# Patient Record
Sex: FEMALE | Race: BLACK OR AFRICAN AMERICAN | NOT HISPANIC OR LATINO | Employment: OTHER | URBAN - METROPOLITAN AREA
[De-identification: names, ages, dates, MRNs, and addresses within clinical notes are randomized per-mention and may not be internally consistent; named-entity substitution may affect disease eponyms.]

---

## 2024-01-22 ENCOUNTER — OFFICE VISIT (OUTPATIENT)
Dept: FAMILY MEDICINE CLINIC | Facility: CLINIC | Age: 69
End: 2024-01-22
Payer: MEDICARE

## 2024-01-22 ENCOUNTER — LAB (OUTPATIENT)
Dept: LAB | Facility: HOSPITAL | Age: 69
End: 2024-01-22
Payer: MEDICARE

## 2024-01-22 VITALS
BODY MASS INDEX: 30.62 KG/M2 | HEIGHT: 61 IN | HEART RATE: 73 BPM | DIASTOLIC BLOOD PRESSURE: 108 MMHG | TEMPERATURE: 97.9 F | OXYGEN SATURATION: 99 % | WEIGHT: 162.2 LBS | SYSTOLIC BLOOD PRESSURE: 180 MMHG

## 2024-01-22 DIAGNOSIS — Z71.1 PHYSICALLY WELL BUT WORRIED: ICD-10-CM

## 2024-01-22 DIAGNOSIS — K21.9 GASTROESOPHAGEAL REFLUX DISEASE, UNSPECIFIED WHETHER ESOPHAGITIS PRESENT: ICD-10-CM

## 2024-01-22 DIAGNOSIS — I10 HYPERTENSION, UNSPECIFIED TYPE: Primary | ICD-10-CM

## 2024-01-22 DIAGNOSIS — E78.5 DYSLIPIDEMIA: ICD-10-CM

## 2024-01-22 LAB
ALBUMIN SERPL-MCNC: 4.3 G/DL (ref 3.5–5.2)
ALBUMIN/GLOB SERPL: 1.4 G/DL
ALP SERPL-CCNC: 101 U/L (ref 39–117)
ALT SERPL W P-5'-P-CCNC: 20 U/L (ref 1–33)
ANION GAP SERPL CALCULATED.3IONS-SCNC: 10.1 MMOL/L (ref 5–15)
AST SERPL-CCNC: 21 U/L (ref 1–32)
BILIRUB SERPL-MCNC: 0.3 MG/DL (ref 0–1.2)
BUN SERPL-MCNC: 19 MG/DL (ref 8–23)
BUN/CREAT SERPL: 17.1 (ref 7–25)
CALCIUM SPEC-SCNC: 10.1 MG/DL (ref 8.6–10.5)
CHLORIDE SERPL-SCNC: 106 MMOL/L (ref 98–107)
CHOLEST SERPL-MCNC: 187 MG/DL (ref 0–200)
CO2 SERPL-SCNC: 27.9 MMOL/L (ref 22–29)
CREAT SERPL-MCNC: 1.11 MG/DL (ref 0.57–1)
DEPRECATED RDW RBC AUTO: 38.8 FL (ref 37–54)
EGFRCR SERPLBLD CKD-EPI 2021: 53.9 ML/MIN/1.73
ERYTHROCYTE [DISTWIDTH] IN BLOOD BY AUTOMATED COUNT: 13 % (ref 12.3–15.4)
GLOBULIN UR ELPH-MCNC: 3 GM/DL
GLUCOSE SERPL-MCNC: 103 MG/DL (ref 65–99)
HCT VFR BLD AUTO: 41.2 % (ref 34–46.6)
HDLC SERPL-MCNC: 34 MG/DL (ref 40–60)
HGB BLD-MCNC: 13.9 G/DL (ref 12–15.9)
LDLC SERPL CALC-MCNC: 115 MG/DL (ref 0–100)
LDLC/HDLC SERPL: 3.21 {RATIO}
MCH RBC QN AUTO: 27.8 PG (ref 26.6–33)
MCHC RBC AUTO-ENTMCNC: 33.7 G/DL (ref 31.5–35.7)
MCV RBC AUTO: 82.4 FL (ref 79–97)
PLATELET # BLD AUTO: 190 10*3/MM3 (ref 140–450)
PMV BLD AUTO: 11.1 FL (ref 6–12)
POTASSIUM SERPL-SCNC: 4.7 MMOL/L (ref 3.5–5.2)
PROT SERPL-MCNC: 7.3 G/DL (ref 6–8.5)
RBC # BLD AUTO: 5 10*6/MM3 (ref 3.77–5.28)
SODIUM SERPL-SCNC: 144 MMOL/L (ref 136–145)
T4 FREE SERPL-MCNC: 1.19 NG/DL (ref 0.93–1.7)
TRIGL SERPL-MCNC: 219 MG/DL (ref 0–150)
TSH SERPL DL<=0.05 MIU/L-ACNC: 1.42 UIU/ML (ref 0.27–4.2)
VLDLC SERPL-MCNC: 38 MG/DL (ref 5–40)
WBC NRBC COR # BLD AUTO: 5.06 10*3/MM3 (ref 3.4–10.8)

## 2024-01-22 PROCEDURE — 85027 COMPLETE CBC AUTOMATED: CPT

## 2024-01-22 PROCEDURE — 80061 LIPID PANEL: CPT

## 2024-01-22 PROCEDURE — 84439 ASSAY OF FREE THYROXINE: CPT

## 2024-01-22 PROCEDURE — 84443 ASSAY THYROID STIM HORMONE: CPT

## 2024-01-22 PROCEDURE — 80053 COMPREHEN METABOLIC PANEL: CPT

## 2024-01-22 RX ORDER — METOPROLOL TARTRATE 100 MG/1
TABLET ORAL
COMMUNITY
Start: 2023-11-27 | End: 2024-01-22 | Stop reason: SDUPTHER

## 2024-01-22 RX ORDER — OMEPRAZOLE 40 MG/1
CAPSULE, DELAYED RELEASE ORAL
COMMUNITY
Start: 2023-11-15 | End: 2024-01-22 | Stop reason: SDUPTHER

## 2024-01-22 RX ORDER — METOPROLOL TARTRATE 100 MG/1
100 TABLET ORAL DAILY
Qty: 30 TABLET | Refills: 2 | Status: SHIPPED | OUTPATIENT
Start: 2024-01-22

## 2024-01-22 RX ORDER — LISINOPRIL 10 MG/1
30 TABLET ORAL DAILY
Qty: 90 TABLET | Refills: 1 | Status: SHIPPED | OUTPATIENT
Start: 2024-01-22

## 2024-01-22 RX ORDER — LISINOPRIL 10 MG/1
30 TABLET ORAL DAILY
COMMUNITY
Start: 2023-11-28 | End: 2024-01-22 | Stop reason: SDUPTHER

## 2024-01-22 RX ORDER — OMEPRAZOLE 40 MG/1
40 CAPSULE, DELAYED RELEASE ORAL DAILY
Qty: 30 CAPSULE | Refills: 2 | Status: SHIPPED | OUTPATIENT
Start: 2024-01-22

## 2024-01-22 NOTE — ASSESSMENT & PLAN NOTE
Hypertension is worsening.  Dietary sodium restriction.  Regular aerobic exercise.  Medication changes per orders.  Ambulatory blood pressure monitoring.  Blood pressure will be reassessed in 2 weeks.    Discussed with patient that she needs to monitor her blood pressure twice daily.  Keep these measurements on a piece of paper and please bring her back in 2 weeks to your next appointment.  Discussed with the patient the importance of controlling her blood pressure.  As she is asymptomatic today I we will make changes to her current medications and I would like to see her back in 2 weeks.  Discussed at length that I would like her to reduce her salt intake.  I have attached recommendations to her AVS.  I advised her that a lot of her TV dinners and seasonings can have added salt.  I would like her to make changes in her diet and make sure that her canned vegetables do not have added salt as well.    Discussed with her that if she begins to have chest pain, visual changes, abdominal pain, facial asymmetry or weakness to go to the emergency room.  Verbalized understanding and agreed to this plan.
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
## 2024-01-23 ENCOUNTER — TELEPHONE (OUTPATIENT)
Dept: FAMILY MEDICINE CLINIC | Facility: CLINIC | Age: 69
End: 2024-01-23
Payer: MEDICARE

## 2024-01-23 RX ORDER — ATORVASTATIN CALCIUM 10 MG/1
10 TABLET, FILM COATED ORAL NIGHTLY
Qty: 90 TABLET | Refills: 3 | Status: SHIPPED | OUTPATIENT
Start: 2024-01-23

## 2024-01-23 NOTE — TELEPHONE ENCOUNTER
----- Message from Fiona Fitch PA-C sent at 1/23/2024  8:43 AM EST -----    I have reviewed your blood work this morning.  Your GFR which is your kidney function is a little low.  I would assume that this is likely due to your high blood pressure.  It is really important that you take your medications daily and continue to monitor your blood pressure.  I would like to check these labs in 1 month, but still would like to see you in 2 weeks.     I am also going to start you on a medicine for your cholesterol called atorvastatin at a low dose.  You can pick this up from your pharmacy.  We will check this again in 6 months.    Attempted to contact patient, no answer. LVM with office # given.     Please relay message and document.

## 2024-01-24 NOTE — TELEPHONE ENCOUNTER
Patient informed of lab results and changes to medications. She will come in on 2/9/2024 to discuss further.     Quantity Per Injection Site (Units): 4

## 2024-02-09 ENCOUNTER — OFFICE VISIT (OUTPATIENT)
Dept: FAMILY MEDICINE CLINIC | Facility: CLINIC | Age: 69
End: 2024-02-09
Payer: MEDICARE

## 2024-02-09 VITALS
HEART RATE: 82 BPM | BODY MASS INDEX: 30.29 KG/M2 | WEIGHT: 160.4 LBS | HEIGHT: 61 IN | OXYGEN SATURATION: 100 % | DIASTOLIC BLOOD PRESSURE: 98 MMHG | SYSTOLIC BLOOD PRESSURE: 138 MMHG | TEMPERATURE: 97.6 F

## 2024-02-09 DIAGNOSIS — Z12.31 ENCOUNTER FOR SCREENING MAMMOGRAM FOR MALIGNANT NEOPLASM OF BREAST: ICD-10-CM

## 2024-02-09 DIAGNOSIS — E78.5 DYSLIPIDEMIA: ICD-10-CM

## 2024-02-09 DIAGNOSIS — L30.9 DERMATITIS: Primary | ICD-10-CM

## 2024-02-09 DIAGNOSIS — H61.23 BILATERAL IMPACTED CERUMEN: ICD-10-CM

## 2024-02-09 DIAGNOSIS — I10 PRIMARY HYPERTENSION: ICD-10-CM

## 2024-02-09 DIAGNOSIS — Z78.0 POST-MENOPAUSAL: ICD-10-CM

## 2024-02-09 NOTE — ASSESSMENT & PLAN NOTE
Recommend that the patient undergo ear cleaning today as her impacted cerumen is starting to cause difficulty for her hearing.  Patient was counseled on the risks and benefits.  She would like to attempt this today.

## 2024-02-09 NOTE — PROGRESS NOTES
The ABCs of the Annual Wellness Visit  Subsequent Medicare Wellness Visit    Subjective    {Wrapup  Review (Popup)  Advance Care Planning  Labs  CC  Problem List  Visit Diagnosis  Medications  Result Review  Imaging  [facility]  [facility]  SnapShot  Encounters  Notes  Media  Procedures :23}  Falguni Laureano is a 68 y.o. female who presents for a Subsequent Medicare Wellness Visit.    The following portions of the patient's history were reviewed and   updated as appropriate: allergies, current medications, past family history, past medical history, past social history, past surgical history, and problem list.    Compared to one year ago, the patient feels her physical   health is {better worse same:94629}.    Compared to one year ago, the patient feels her mental   health is {better worse same:18055}.    Recent Hospitalizations:  {Hospital Admission Status in the last 365 days:42063}      Current Medical Providers:  Patient Care Team:  Fiona Fitch PA-C as PCP - General (Physician Assistant)    Outpatient Medications Prior to Visit   Medication Sig Dispense Refill    atorvastatin (LIPITOR) 10 MG tablet Take 1 tablet by mouth Every Night. 90 tablet 3    lisinopril (PRINIVIL,ZESTRIL) 10 MG tablet Take 3 tablets by mouth Daily. 90 tablet 1    metoprolol tartrate (LOPRESSOR) 100 MG tablet Take 1 tablet by mouth Daily. 30 tablet 2    omeprazole (priLOSEC) 40 MG capsule Take 1 capsule by mouth Daily. 30 capsule 2     No facility-administered medications prior to visit.       No opioid medication identified on active medication list. I have reviewed chart for other potential  high risk medication/s and harmful drug interactions in the elderly.        Aspirin is not on active medication list.  Aspirin use is not indicated based on review of current medical condition/s. Risk of harm outweighs potential benefits.  .    Patient Active Problem List   Diagnosis    Hypertension  "   Gastroesophageal reflux disease    Physically well but worried     Advance Care Planning   Advance Care Planning     Advance Directive is not on file.  {ACP Discussion, Advance Directive not in EMR:17585}     Objective    Vitals:    24 1011   BP: 138/98   BP Location: Left arm   Patient Position: Sitting   Cuff Size: Adult   Pulse: 82   Temp: 97.6 °F (36.4 °C)   TempSrc: Infrared   SpO2: 100%   Weight: 72.8 kg (160 lb 6.4 oz)   Height: 154.9 cm (61\")   PainSc: 0-No pain     Estimated body mass index is 30.31 kg/m² as calculated from the following:    Height as of this encounter: 154.9 cm (61\").    Weight as of this encounter: 72.8 kg (160 lb 6.4 oz).           Does the patient have evidence of cognitive impairment?   {Yes/No:09081}    Lab Results   Component Value Date    TRIG 219 (H) 2024    HDL 34 (L) 2024     (H) 2024    VLDL 38 2024          HEALTH RISK ASSESSMENT    Smoking Status:  Social History     Tobacco Use   Smoking Status Never   Smokeless Tobacco Never     Alcohol Consumption:  Social History     Substance and Sexual Activity   Alcohol Use Not Currently     Fall Risk Screen:    STEADI Fall Risk Assessment was completed, and patient is at LOW risk for falls.Assessment completed on:2024    Depression Screenin/9/2024    10:16 AM   PHQ-2/PHQ-9 Depression Screening   Little Interest or Pleasure in Doing Things 0-->not at all   Feeling Down, Depressed or Hopeless 0-->not at all   PHQ-9: Brief Depression Severity Measure Score 0       Health Habits and Functional and Cognitive Screenin/9/2024    10:16 AM   Functional & Cognitive Status   Do you have difficulty preparing food and eating? No   Do you have difficulty bathing yourself, getting dressed or grooming yourself? No   Do you have difficulty using the toilet? No   Do you have difficulty moving around from place to place? No   Do you have trouble with steps or getting out of a bed or a chair? " No   Current Diet Limited Junk Food   Dental Exam Not up to date   Eye Exam Not up to date   Exercise (times per week) 0 times per week   Current Exercises Include No Regular Exercise   Do you need help using the phone?  No   Are you deaf or do you have serious difficulty hearing?  No   Do you need help to go to places out of walking distance? No   Do you need help shopping? No   Do you need help preparing meals?  No   Do you need help with housework?  No   Do you need help with laundry? No   Do you need help taking your medications? No   Do you need help managing money? No   Do you ever drive or ride in a car without wearing a seat belt? No   Have you felt unusual stress, anger or loneliness in the last month? No   Who do you live with? Child   If you need help, do you have trouble finding someone available to you? No   Have you been bothered in the last four weeks by sexual problems? No   Do you have difficulty concentrating, remembering or making decisions? No       Age-appropriate Screening Schedule:  Refer to the list below for future screening recommendations based on patient's age, sex and/or medical conditions. Orders for these recommended tests are listed in the plan section. The patient has been provided with a written plan.    Health Maintenance   Topic Date Due    MAMMOGRAM  Never done    DXA SCAN  Never done    COLORECTAL CANCER SCREENING  Never done    ZOSTER VACCINE (1 of 2) Never done    RSV Vaccine - Adults (1 - 1-dose 60+ series) Never done    HEPATITIS C SCREENING  Never done    ANNUAL WELLNESS VISIT  Never done    INFLUENZA VACCINE  03/31/2024 (Originally 8/1/2023)    COVID-19 Vaccine (1) 04/30/2024 (Originally 1955)    Pneumococcal Vaccine 65+ (1 of 1 - PCV) 02/09/2025 (Originally 3/19/2019)    TDAP/TD VACCINES (1 - Tdap) 02/09/2025 (Originally [birth year]19/1974)    BMI FOLLOWUP  01/22/2025                  CMS Preventative Services Quick Reference  Risk Factors Identified During  Encounter:    {Medicare Wellness Risk Factors:45656}    The above risks/problems have been discussed with the patient.  Pertinent information has been shared with the patient in the After Visit Summary.    There are no diagnoses linked to this encounter.    Follow Up:   Next Medicare Wellness visit to be scheduled in 1 year.      An After Visit Summary and PPPS were made available to the patient.

## 2024-02-09 NOTE — ASSESSMENT & PLAN NOTE
Hypertension is improving with treatment.  Dietary sodium restriction.  Regular aerobic exercise.  Medication changes per orders.  Ambulatory blood pressure monitoring.  Blood pressure will be reassessed at the next regular appointment.

## 2024-02-09 NOTE — PROGRESS NOTES
The ABCs of the Annual Wellness Visit  Subsequent Medicare Wellness Visit    Subjective    Falguni Laureano is a 68 y.o. female who presents for a Subsequent Medicare Wellness Visit.    The following portions of the patient's history were reviewed and   updated as appropriate: allergies, current medications, past family history, past medical history, past social history, past surgical history, and problem list.    Compared to one year ago, the patient feels her physical   health is better.    Compared to one year ago, the patient feels her mental   health is better.    Recent Hospitalizations:  She was not admitted to the hospital during the last year.       Current Medical Providers:  Patient Care Team:  Fiona Fitch PA-C as PCP - General (Physician Assistant)    Outpatient Medications Prior to Visit   Medication Sig Dispense Refill    atorvastatin (LIPITOR) 10 MG tablet Take 1 tablet by mouth Every Night. 90 tablet 3    lisinopril (PRINIVIL,ZESTRIL) 10 MG tablet Take 3 tablets by mouth Daily. 90 tablet 1    metoprolol tartrate (LOPRESSOR) 100 MG tablet Take 1 tablet by mouth Daily. 30 tablet 2    omeprazole (priLOSEC) 40 MG capsule Take 1 capsule by mouth Daily. 30 capsule 2     No facility-administered medications prior to visit.       No opioid medication identified on active medication list. I have reviewed chart for other potential  high risk medication/s and harmful drug interactions in the elderly.        Aspirin is not on active medication list.  Aspirin use is not indicated based on review of current medical condition/s. Risk of harm outweighs potential benefits.  .    Patient Active Problem List   Diagnosis    Hypertension    Gastroesophageal reflux disease    Physically well but worried    Dermatitis    Bilateral impacted cerumen    Dyslipidemia     Advance Care Planning   Advance Care Planning     Advance Directive is not on file.  ACP discussion was held with the patient during this visit. Patient  "does not have an advance directive, information provided.     Objective    Vitals:    24 1011   BP: 138/98   BP Location: Left arm   Patient Position: Sitting   Cuff Size: Adult   Pulse: 82   Temp: 97.6 °F (36.4 °C)   TempSrc: Infrared   SpO2: 100%   Weight: 72.8 kg (160 lb 6.4 oz)   Height: 154.9 cm (61\")   PainSc: 0-No pain     Estimated body mass index is 30.31 kg/m² as calculated from the following:    Height as of this encounter: 154.9 cm (61\").    Weight as of this encounter: 72.8 kg (160 lb 6.4 oz).         Does the patient have evidence of cognitive impairment? No    Lab Results   Component Value Date    TRIG 219 (H) 2024    HDL 34 (L) 2024     (H) 2024    VLDL 38 2024        HEALTH RISK ASSESSMENT    Smoking Status:  Social History     Tobacco Use   Smoking Status Never   Smokeless Tobacco Never     Alcohol Consumption:  Social History     Substance and Sexual Activity   Alcohol Use Not Currently     Fall Risk Screen:    STEADI Fall Risk Assessment was completed, and patient is at LOW risk for falls.Assessment completed on:2024    Depression Screenin/9/2024    10:16 AM   PHQ-2/PHQ-9 Depression Screening   Little Interest or Pleasure in Doing Things 0-->not at all   Feeling Down, Depressed or Hopeless 0-->not at all   PHQ-9: Brief Depression Severity Measure Score 0       Health Habits and Functional and Cognitive Screenin/9/2024    10:16 AM   Functional & Cognitive Status   Do you have difficulty preparing food and eating? No   Do you have difficulty bathing yourself, getting dressed or grooming yourself? No   Do you have difficulty using the toilet? No   Do you have difficulty moving around from place to place? No   Do you have trouble with steps or getting out of a bed or a chair? No   Current Diet Limited Junk Food   Dental Exam Not up to date   Eye Exam Not up to date   Exercise (times per week) 0 times per week   Current Exercises Include No " Regular Exercise   Do you need help using the phone?  No   Are you deaf or do you have serious difficulty hearing?  No   Do you need help to go to places out of walking distance? No   Do you need help shopping? No   Do you need help preparing meals?  No   Do you need help with housework?  No   Do you need help with laundry? No   Do you need help taking your medications? No   Do you need help managing money? No   Do you ever drive or ride in a car without wearing a seat belt? No   Have you felt unusual stress, anger or loneliness in the last month? No   Who do you live with? Child   If you need help, do you have trouble finding someone available to you? No   Have you been bothered in the last four weeks by sexual problems? No   Do you have difficulty concentrating, remembering or making decisions? No       Age-appropriate Screening Schedule:  Refer to the list below for future screening recommendations based on patient's age, sex and/or medical conditions. Orders for these recommended tests are listed in the plan section. The patient has been provided with a written plan.    Health Maintenance   Topic Date Due    MAMMOGRAM  Never done    DXA SCAN  Never done    COLORECTAL CANCER SCREENING  Never done    ZOSTER VACCINE (1 of 2) Never done    RSV Vaccine - Adults (1 - 1-dose 60+ series) Never done    HEPATITIS C SCREENING  Never done    ANNUAL WELLNESS VISIT  Never done    INFLUENZA VACCINE  03/31/2024 (Originally 8/1/2023)    COVID-19 Vaccine (1) 04/30/2024 (Originally 1955)    Pneumococcal Vaccine 65+ (1 of 1 - PCV) 02/09/2025 (Originally 3/19/2019)    TDAP/TD VACCINES (1 - Tdap) 02/09/2025 (Originally 3/19/1974)    BMI FOLLOWUP  01/22/2025                  CMS Preventative Services Quick Reference  Risk Factors Identified During Encounter  None Identified    The above risks/problems have been discussed with the patient.  Pertinent information has been shared with the patient in the After Visit Summary.  An  "After Visit Summary and PPPS were made available to the patient.    Follow Up:   Next Medicare Wellness visit to be scheduled in 1 year.       Additional E&M Note during same encounter follows:  Patient has multiple medical problems which are significant and separately identifiable that require additional work above and beyond the Medicare Wellness Visit.      Chief Complaint  Medicare Wellness-subsequent    Subjective        HPI  Falguni Laureano is also being seen today for Blood pressure check.  Patient was recently seen for new patient in this office.  She had uncontrolled hypertension.  We made changes to her blood pressure medication 2 weeks ago.  Patient's blood pressure is much better today but still elevated.  Denies chest pain, shortness of breath, visual changes.    Patient also was started on Lipitor for cholesterol at her last visit.  Patient to continue and we will recheck labs in 6 months.    Patient presents today with complaints of pain around her eye.  Patient denies any pain with movement of her eye or in her actual eye itself.  She denies any visual changes.  Patient states that it is the skin near the corner of her eye.  She states she noticed it yesterday.  She states that the pain is very \"small.\"  But that it is bothering her.    Patient also states her hearing has been muffled recently.  She states that she has always had very waxy ears.  She states that her ears feel \"full.\"  Denies any pain.  Patient would like her ears looked at today.  She states she has been using over-the-counter drops at home and they have not been working.    Patient states she did receive a colonoscopy in the past several years.  We requested her records about 2 weeks ago.  Once we get these we will update need for colonoscopy at this point.    Patient has never had a mammogram done or does not remember the last time she had 1.  Patient has also not had a DEXA scan done.    Objective   Vital Signs:  /98 (BP " "Location: Left arm, Patient Position: Sitting, Cuff Size: Adult)   Pulse 82   Temp 97.6 °F (36.4 °C) (Infrared)   Ht 154.9 cm (61\")   Wt 72.8 kg (160 lb 6.4 oz)   SpO2 100%   BMI 30.31 kg/m²     Physical Exam  Vitals reviewed.   Constitutional:       Appearance: Normal appearance.   HENT:      Head: Normocephalic and atraumatic.      Right Ear: External ear normal. There is impacted cerumen.      Left Ear: External ear normal. There is impacted cerumen.      Nose: Nose normal.      Mouth/Throat:      Mouth: Mucous membranes are moist.   Eyes:      Pupils: Pupils are equal, round, and reactive to light.   Cardiovascular:      Rate and Rhythm: Normal rate and regular rhythm.      Pulses: Normal pulses.      Heart sounds: Normal heart sounds.   Pulmonary:      Effort: Pulmonary effort is normal.      Breath sounds: Normal breath sounds.   Abdominal:      General: Abdomen is flat. Bowel sounds are normal.   Skin:     General: Skin is warm.   Neurological:      General: No focal deficit present.      Mental Status: She is alert and oriented to person, place, and time.   Psychiatric:         Mood and Affect: Mood normal.          The following data was reviewed by: Fiona Fitch PA-C on 02/09/2024:  CMP          1/22/2024    10:45   CMP   Glucose 103    BUN 19    Creatinine 1.11    EGFR 53.9    Sodium 144    Potassium 4.7    Chloride 106    Calcium 10.1    Total Protein 7.3    Albumin 4.3    Globulin 3.0    Total Bilirubin 0.3    Alkaline Phosphatase 101    AST (SGOT) 21    ALT (SGPT) 20    Albumin/Globulin Ratio 1.4    BUN/Creatinine Ratio 17.1    Anion Gap 10.1      CBC          1/22/2024    10:45   CBC   WBC 5.06    RBC 5.00    Hemoglobin 13.9    Hematocrit 41.2    MCV 82.4    MCH 27.8    MCHC 33.7    RDW 13.0    Platelets 190      Lipid Panel          1/22/2024    10:45   Lipid Panel   Total Cholesterol 187    Triglycerides 219    HDL Cholesterol 34    VLDL Cholesterol 38    LDL Cholesterol  115    LDL/HDL " Ratio 3.21         Assessment and Plan   Diagnoses and all orders for this visit:    1. Dermatitis (Primary)  Assessment & Plan:  Appears to be some skin breakdown near the eye.  Where patient is rubbing it and constantly blinking I could see where this would be irritating.  Recommend using a warm washcloth to lay on the area for soothing effects.  Would also recommend that she applies some topical Vaseline to the area.  Be mindful not to get this in your eye.      2. Primary hypertension  Assessment & Plan:  Hypertension is improving with treatment.  Dietary sodium restriction.  Regular aerobic exercise.  Medication changes per orders.  Ambulatory blood pressure monitoring.  Blood pressure will be reassessed at the next regular appointment.      3. Bilateral impacted cerumen  Assessment & Plan:  Recommend that the patient undergo ear cleaning today as her impacted cerumen is starting to cause difficulty for her hearing.  Patient was counseled on the risks and benefits.  She would like to attempt this today.      4. Post-menopausal  -     DEXA Bone Density Axial; Future    5. Encounter for screening mammogram for malignant neoplasm of breast  -     Mammo Screening Digital Tomosynthesis Bilateral With CAD; Future    6. Dyslipidemia  Assessment & Plan:  Continue Lipitor 10 mg daily.  Will recheck in about 6 months.           I spent 35 minutes caring for Falguni on this date of service. This time includes time spent by me in the following activities:preparing for the visit, reviewing tests, obtaining and/or reviewing a separately obtained history, performing a medically appropriate examination and/or evaluation , counseling and educating the patient/family/caregiver, and ordering medications, tests, or procedures  Follow Up   Return in about 6 months (around 8/9/2024) for Recheck BP and labs .  Patient was given instructions and counseling regarding her condition or for health maintenance advice. Please see specific  information pulled into the AVS if appropriate.

## 2024-02-09 NOTE — ASSESSMENT & PLAN NOTE
Appears to be some skin breakdown near the eye.  Where patient is rubbing it and constantly blinking I could see where this would be irritating.  Recommend using a warm washcloth to lay on the area for soothing effects.  Would also recommend that she applies some topical Vaseline to the area.  Be mindful not to get this in your eye.

## 2024-02-29 ENCOUNTER — TELEPHONE (OUTPATIENT)
Dept: FAMILY MEDICINE CLINIC | Facility: CLINIC | Age: 69
End: 2024-02-29
Payer: MEDICARE

## 2024-02-29 ENCOUNTER — DOCUMENTATION (OUTPATIENT)
Dept: FAMILY MEDICINE CLINIC | Facility: CLINIC | Age: 69
End: 2024-02-29
Payer: MEDICARE

## 2024-02-29 NOTE — TELEPHONE ENCOUNTER
Called to inform the patient that I need her to sign some additional documentation in order to receive her old health records from [place].  I told the patient that I would leave them at the desk for her to sign and we would resubmit.  Patient states she will come in when she has a chance.

## 2024-03-13 DIAGNOSIS — I10 HYPERTENSION, UNSPECIFIED TYPE: ICD-10-CM

## 2024-03-13 RX ORDER — LISINOPRIL 10 MG/1
30 TABLET ORAL DAILY
Qty: 90 TABLET | Refills: 1 | Status: SHIPPED | OUTPATIENT
Start: 2024-03-13

## 2024-03-13 NOTE — TELEPHONE ENCOUNTER
Rx Refill Note  Requested Prescriptions     Pending Prescriptions Disp Refills    lisinopril (PRINIVIL,ZESTRIL) 10 MG tablet [Pharmacy Med Name: LISINOPRIL 10MG TABLETS] 90 tablet 1     Sig: TAKE 3 TABLETS BY MOUTH DAILY      Last office visit with prescribing clinician: 2/9/2024   Last telemedicine visit with prescribing clinician: Visit date not found   Next office visit with prescribing clinician: 8/9/2024                         Would you like a call back once the refill request has been completed: [] Yes [] No    If the office needs to give you a call back, can they leave a voicemail: [] Yes [] No    Ileana Aldridge MA  03/13/24, 10:13 EDT

## 2024-04-29 DIAGNOSIS — K21.9 GASTROESOPHAGEAL REFLUX DISEASE, UNSPECIFIED WHETHER ESOPHAGITIS PRESENT: ICD-10-CM

## 2024-04-29 DIAGNOSIS — I10 HYPERTENSION, UNSPECIFIED TYPE: ICD-10-CM

## 2024-04-29 RX ORDER — LISINOPRIL 10 MG/1
30 TABLET ORAL DAILY
Qty: 270 TABLET | Refills: 0 | Status: SHIPPED | OUTPATIENT
Start: 2024-04-29

## 2024-04-29 RX ORDER — OMEPRAZOLE 40 MG/1
40 CAPSULE, DELAYED RELEASE ORAL DAILY
Qty: 90 CAPSULE | Refills: 0 | Status: SHIPPED | OUTPATIENT
Start: 2024-04-29

## 2024-05-02 ENCOUNTER — HOSPITAL ENCOUNTER (OUTPATIENT)
Dept: BONE DENSITY | Facility: HOSPITAL | Age: 69
Discharge: HOME | End: 2024-05-02
Payer: MEDICARE

## 2024-05-02 ENCOUNTER — HOSPITAL ENCOUNTER (OUTPATIENT)
Dept: MAMMOGRAPHY | Facility: HOSPITAL | Age: 69
Discharge: HOME | End: 2024-05-02
Payer: MEDICARE

## 2024-05-02 DIAGNOSIS — Z78.0 POST-MENOPAUSAL: ICD-10-CM

## 2024-05-02 DIAGNOSIS — Z12.31 ENCOUNTER FOR SCREENING MAMMOGRAM FOR MALIGNANT NEOPLASM OF BREAST: ICD-10-CM

## 2024-05-02 PROCEDURE — 77063 BREAST TOMOSYNTHESIS BI: CPT

## 2024-05-02 PROCEDURE — 77080 DXA BONE DENSITY AXIAL: CPT

## 2024-05-02 PROCEDURE — 77067 SCR MAMMO BI INCL CAD: CPT | Performed by: RADIOLOGY

## 2024-05-02 PROCEDURE — 77063 BREAST TOMOSYNTHESIS BI: CPT | Performed by: RADIOLOGY

## 2024-05-02 PROCEDURE — 77067 SCR MAMMO BI INCL CAD: CPT

## 2024-05-03 ENCOUNTER — TELEPHONE (OUTPATIENT)
Dept: FAMILY MEDICINE CLINIC | Facility: CLINIC | Age: 69
End: 2024-05-03
Payer: MEDICARE

## 2024-05-03 DIAGNOSIS — M85.89 OSTEOPENIA OF MULTIPLE SITES: Primary | ICD-10-CM

## 2024-05-29 DIAGNOSIS — K21.9 GASTROESOPHAGEAL REFLUX DISEASE, UNSPECIFIED WHETHER ESOPHAGITIS PRESENT: ICD-10-CM

## 2024-05-29 RX ORDER — OMEPRAZOLE 40 MG/1
40 CAPSULE, DELAYED RELEASE ORAL DAILY
Qty: 90 CAPSULE | Refills: 0 | Status: SHIPPED | OUTPATIENT
Start: 2024-05-29

## 2024-06-19 ENCOUNTER — TRANSCRIBE ORDERS (OUTPATIENT)
Dept: MAMMOGRAPHY | Facility: HOSPITAL | Age: 69
End: 2024-06-19
Payer: MEDICARE

## 2024-06-19 ENCOUNTER — HOSPITAL ENCOUNTER (OUTPATIENT)
Dept: ULTRASOUND IMAGING | Facility: HOSPITAL | Age: 69
Discharge: HOME | End: 2024-06-19
Payer: MEDICARE

## 2024-06-19 ENCOUNTER — HOSPITAL ENCOUNTER (OUTPATIENT)
Dept: MAMMOGRAPHY | Facility: HOSPITAL | Age: 69
Discharge: HOME | End: 2024-06-19
Payer: MEDICARE

## 2024-06-19 DIAGNOSIS — R92.8 ABNORMAL MAMMOGRAM: ICD-10-CM

## 2024-06-19 DIAGNOSIS — R92.8 ABNORMAL MAMMOGRAM: Primary | ICD-10-CM

## 2024-06-19 PROCEDURE — 76642 ULTRASOUND BREAST LIMITED: CPT | Performed by: RADIOLOGY

## 2024-06-19 PROCEDURE — G0279 TOMOSYNTHESIS, MAMMO: HCPCS

## 2024-06-19 PROCEDURE — G0279 TOMOSYNTHESIS, MAMMO: HCPCS | Performed by: RADIOLOGY

## 2024-06-19 PROCEDURE — 77066 DX MAMMO INCL CAD BI: CPT | Performed by: RADIOLOGY

## 2024-06-19 PROCEDURE — 77066 DX MAMMO INCL CAD BI: CPT

## 2024-06-19 PROCEDURE — 76642 ULTRASOUND BREAST LIMITED: CPT

## 2024-06-21 DIAGNOSIS — I10 HYPERTENSION, UNSPECIFIED TYPE: ICD-10-CM

## 2024-06-21 RX ORDER — METOPROLOL TARTRATE 100 MG/1
100 TABLET ORAL DAILY
Qty: 90 TABLET | Refills: 0 | Status: SHIPPED | OUTPATIENT
Start: 2024-06-21

## 2024-06-24 ENCOUNTER — TELEPHONE (OUTPATIENT)
Dept: MAMMOGRAPHY | Facility: HOSPITAL | Age: 69
End: 2024-06-24
Payer: MEDICARE

## 2024-06-24 NOTE — TELEPHONE ENCOUNTER
Returned patient's call, questions have been answered. Encouraged calling [facility] with any additional questions.

## 2024-06-25 ENCOUNTER — APPOINTMENT (OUTPATIENT)
Dept: GENERAL RADIOLOGY | Facility: HOSPITAL | Age: 69
End: 2024-06-25
Payer: MEDICARE

## 2024-06-25 ENCOUNTER — APPOINTMENT (OUTPATIENT)
Dept: CT IMAGING | Facility: HOSPITAL | Age: 69
End: 2024-06-25
Payer: MEDICARE

## 2024-06-25 ENCOUNTER — HOSPITAL ENCOUNTER (OUTPATIENT)
Facility: HOSPITAL | Age: 69
Setting detail: OBSERVATION
Discharge: HOME | End: 2024-06-27
Attending: EMERGENCY MEDICINE | Admitting: INTERNAL MEDICINE
Payer: MEDICARE

## 2024-06-25 ENCOUNTER — OFFICE VISIT (OUTPATIENT)
Dept: FAMILY MEDICINE CLINIC | Facility: CLINIC | Age: 69
End: 2024-06-25
Payer: MEDICARE

## 2024-06-25 VITALS
BODY MASS INDEX: 30.5 KG/M2 | SYSTOLIC BLOOD PRESSURE: 190 MMHG | WEIGHT: 161.4 LBS | OXYGEN SATURATION: 99 % | HEART RATE: 71 BPM | DIASTOLIC BLOOD PRESSURE: 120 MMHG

## 2024-06-25 DIAGNOSIS — I10 HYPERTENSION, UNSPECIFIED TYPE: ICD-10-CM

## 2024-06-25 DIAGNOSIS — R51.9 GENERALIZED HEADACHE: ICD-10-CM

## 2024-06-25 DIAGNOSIS — I16.0 HYPERTENSIVE URGENCY: Primary | ICD-10-CM

## 2024-06-25 DIAGNOSIS — Z86.73 OLD CEREBROVASCULAR ACCIDENT (CVA) WITHOUT LATE EFFECT: ICD-10-CM

## 2024-06-25 LAB
ALBUMIN SERPL-MCNC: 4.3 G/DL (ref 3.5–5.2)
ALBUMIN/GLOB SERPL: 1.4 G/DL
ALP SERPL-CCNC: 97 U/L (ref 39–117)
ALT SERPL W P-5'-P-CCNC: 16 U/L (ref 1–33)
ANION GAP SERPL CALCULATED.3IONS-SCNC: 7 MMOL/L (ref 5–15)
AST SERPL-CCNC: 17 U/L (ref 1–32)
BASOPHILS # BLD AUTO: 0.01 10*3/MM3 (ref 0–0.2)
BASOPHILS NFR BLD AUTO: 0.2 % (ref 0–1.5)
BILIRUB SERPL-MCNC: 0.5 MG/DL (ref 0–1.2)
BUN SERPL-MCNC: 15 MG/DL (ref 8–23)
BUN/CREAT SERPL: 14.3 (ref 7–25)
CALCIUM SPEC-SCNC: 9.7 MG/DL (ref 8.6–10.5)
CHLORIDE SERPL-SCNC: 103 MMOL/L (ref 98–107)
CO2 SERPL-SCNC: 29 MMOL/L (ref 22–29)
CREAT SERPL-MCNC: 1.05 MG/DL (ref 0.57–1)
DEPRECATED RDW RBC AUTO: 37.2 FL (ref 37–54)
EGFRCR SERPLBLD CKD-EPI 2021: 57.3 ML/MIN/1.73
EOSINOPHIL # BLD AUTO: 0.11 10*3/MM3 (ref 0–0.4)
EOSINOPHIL NFR BLD AUTO: 2.4 % (ref 0.3–6.2)
ERYTHROCYTE [DISTWIDTH] IN BLOOD BY AUTOMATED COUNT: 12.3 % (ref 12.3–15.4)
GEN 5 2HR TROPONIN T REFLEX: 14 NG/L
GLOBULIN UR ELPH-MCNC: 3 GM/DL
GLUCOSE SERPL-MCNC: 97 MG/DL (ref 65–99)
HCT VFR BLD AUTO: 42.2 % (ref 34–46.6)
HGB BLD-MCNC: 13.7 G/DL (ref 12–15.9)
HOLD SPECIMEN: NORMAL
IMM GRANULOCYTES # BLD AUTO: 0.01 10*3/MM3 (ref 0–0.05)
IMM GRANULOCYTES NFR BLD AUTO: 0.2 % (ref 0–0.5)
LYMPHOCYTES # BLD AUTO: 1.26 10*3/MM3 (ref 0.7–3.1)
LYMPHOCYTES NFR BLD AUTO: 27.5 % (ref 19.6–45.3)
MCH RBC QN AUTO: 26.6 PG (ref 26.6–33)
MCHC RBC AUTO-ENTMCNC: 32.5 G/DL (ref 31.5–35.7)
MCV RBC AUTO: 81.8 FL (ref 79–97)
MONOCYTES # BLD AUTO: 0.52 10*3/MM3 (ref 0.1–0.9)
MONOCYTES NFR BLD AUTO: 11.3 % (ref 5–12)
NEUTROPHILS NFR BLD AUTO: 2.68 10*3/MM3 (ref 1.7–7)
NEUTROPHILS NFR BLD AUTO: 58.4 % (ref 42.7–76)
NRBC BLD AUTO-RTO: 0 /100 WBC (ref 0–0.2)
NT-PROBNP SERPL-MCNC: 338.8 PG/ML (ref 0–900)
PLATELET # BLD AUTO: 189 10*3/MM3 (ref 140–450)
PMV BLD AUTO: 9.6 FL (ref 6–12)
POTASSIUM SERPL-SCNC: 4 MMOL/L (ref 3.5–5.2)
PROT SERPL-MCNC: 7.3 G/DL (ref 6–8.5)
QT INTERVAL: 426 MS
QTC INTERVAL: 435 MS
RBC # BLD AUTO: 5.16 10*6/MM3 (ref 3.77–5.28)
SODIUM SERPL-SCNC: 139 MMOL/L (ref 136–145)
TROPONIN T DELTA: 0 NG/L
TROPONIN T SERPL HS-MCNC: 14 NG/L
TSH SERPL DL<=0.05 MIU/L-ACNC: 2.15 UIU/ML (ref 0.27–4.2)
WBC NRBC COR # BLD AUTO: 4.59 10*3/MM3 (ref 3.4–10.8)
WHOLE BLOOD HOLD COAG: NORMAL
WHOLE BLOOD HOLD SPECIMEN: NORMAL

## 2024-06-25 PROCEDURE — G0378 HOSPITAL OBSERVATION PER HR: HCPCS

## 2024-06-25 PROCEDURE — 3080F DIAST BP >= 90 MM HG: CPT

## 2024-06-25 PROCEDURE — 84484 ASSAY OF TROPONIN QUANT: CPT | Performed by: EMERGENCY MEDICINE

## 2024-06-25 PROCEDURE — 99214 OFFICE O/P EST MOD 30 MIN: CPT

## 2024-06-25 PROCEDURE — 25010000002 NICARDIPINE 2.5 MG/ML SOLUTION 10 ML VIAL: Performed by: EMERGENCY MEDICINE

## 2024-06-25 PROCEDURE — 70450 CT HEAD/BRAIN W/O DYE: CPT

## 2024-06-25 PROCEDURE — 83880 ASSAY OF NATRIURETIC PEPTIDE: CPT | Performed by: EMERGENCY MEDICINE

## 2024-06-25 PROCEDURE — 36415 COLL VENOUS BLD VENIPUNCTURE: CPT

## 2024-06-25 PROCEDURE — 96366 THER/PROPH/DIAG IV INF ADDON: CPT

## 2024-06-25 PROCEDURE — 99291 CRITICAL CARE FIRST HOUR: CPT

## 2024-06-25 PROCEDURE — 80053 COMPREHEN METABOLIC PANEL: CPT | Performed by: EMERGENCY MEDICINE

## 2024-06-25 PROCEDURE — 96365 THER/PROPH/DIAG IV INF INIT: CPT

## 2024-06-25 PROCEDURE — 1126F AMNT PAIN NOTED NONE PRSNT: CPT

## 2024-06-25 PROCEDURE — 1160F RVW MEDS BY RX/DR IN RCRD: CPT

## 2024-06-25 PROCEDURE — 99223 1ST HOSP IP/OBS HIGH 75: CPT | Performed by: INTERNAL MEDICINE

## 2024-06-25 PROCEDURE — 96375 TX/PRO/DX INJ NEW DRUG ADDON: CPT

## 2024-06-25 PROCEDURE — 25010000002 HYDRALAZINE PER 20 MG: Performed by: EMERGENCY MEDICINE

## 2024-06-25 PROCEDURE — 93005 ELECTROCARDIOGRAM TRACING: CPT

## 2024-06-25 PROCEDURE — 84443 ASSAY THYROID STIM HORMONE: CPT | Performed by: EMERGENCY MEDICINE

## 2024-06-25 PROCEDURE — 71045 X-RAY EXAM CHEST 1 VIEW: CPT

## 2024-06-25 PROCEDURE — 1159F MED LIST DOCD IN RCRD: CPT

## 2024-06-25 PROCEDURE — 3077F SYST BP >= 140 MM HG: CPT

## 2024-06-25 PROCEDURE — 85025 COMPLETE CBC W/AUTO DIFF WBC: CPT | Performed by: EMERGENCY MEDICINE

## 2024-06-25 PROCEDURE — 25810000003 SODIUM CHLORIDE 0.9 % SOLUTION 250 ML FLEX CONT: Performed by: EMERGENCY MEDICINE

## 2024-06-25 RX ORDER — SODIUM CHLORIDE 0.9 % (FLUSH) 0.9 %
10 SYRINGE (ML) INJECTION AS NEEDED
Status: DISCONTINUED | OUTPATIENT
Start: 2024-06-25 | End: 2024-06-27 | Stop reason: HOSPADM

## 2024-06-25 RX ORDER — POLYETHYLENE GLYCOL 3350 17 G/17G
17 POWDER, FOR SOLUTION ORAL DAILY PRN
Status: DISCONTINUED | OUTPATIENT
Start: 2024-06-25 | End: 2024-06-27 | Stop reason: HOSPADM

## 2024-06-25 RX ORDER — PHENOL 1.4 %
1 AEROSOL, SPRAY (ML) MUCOUS MEMBRANE DAILY
COMMUNITY

## 2024-06-25 RX ORDER — ONDANSETRON 2 MG/ML
4 INJECTION INTRAMUSCULAR; INTRAVENOUS EVERY 6 HOURS PRN
Status: DISCONTINUED | OUTPATIENT
Start: 2024-06-25 | End: 2024-06-27 | Stop reason: HOSPADM

## 2024-06-25 RX ORDER — SODIUM CHLORIDE 0.9 % (FLUSH) 0.9 %
10 SYRINGE (ML) INJECTION EVERY 12 HOURS SCHEDULED
Status: DISCONTINUED | OUTPATIENT
Start: 2024-06-25 | End: 2024-06-27 | Stop reason: HOSPADM

## 2024-06-25 RX ORDER — ASPIRIN 81 MG/1
324 TABLET, CHEWABLE ORAL ONCE
Status: COMPLETED | OUTPATIENT
Start: 2024-06-25 | End: 2024-06-25

## 2024-06-25 RX ORDER — PANTOPRAZOLE SODIUM 40 MG/1
40 TABLET, DELAYED RELEASE ORAL
Status: DISCONTINUED | OUTPATIENT
Start: 2024-06-26 | End: 2024-06-27 | Stop reason: HOSPADM

## 2024-06-25 RX ORDER — ACETAMINOPHEN 500 MG
1 TABLET ORAL EVERY 6 HOURS PRN
COMMUNITY

## 2024-06-25 RX ORDER — LISINOPRIL 40 MG/1
40 TABLET ORAL
Status: DISCONTINUED | OUTPATIENT
Start: 2024-06-25 | End: 2024-06-27 | Stop reason: HOSPADM

## 2024-06-25 RX ORDER — HYDRALAZINE HYDROCHLORIDE 20 MG/ML
20 INJECTION INTRAMUSCULAR; INTRAVENOUS ONCE
Status: COMPLETED | OUTPATIENT
Start: 2024-06-25 | End: 2024-06-25

## 2024-06-25 RX ORDER — BISACODYL 10 MG
10 SUPPOSITORY, RECTAL RECTAL DAILY PRN
Status: DISCONTINUED | OUTPATIENT
Start: 2024-06-25 | End: 2024-06-27 | Stop reason: HOSPADM

## 2024-06-25 RX ORDER — SODIUM CHLORIDE 9 MG/ML
40 INJECTION, SOLUTION INTRAVENOUS AS NEEDED
Status: DISCONTINUED | OUTPATIENT
Start: 2024-06-25 | End: 2024-06-27 | Stop reason: HOSPADM

## 2024-06-25 RX ORDER — BISACODYL 5 MG/1
5 TABLET, DELAYED RELEASE ORAL DAILY PRN
Status: DISCONTINUED | OUTPATIENT
Start: 2024-06-25 | End: 2024-06-27 | Stop reason: HOSPADM

## 2024-06-25 RX ORDER — AMOXICILLIN 250 MG
2 CAPSULE ORAL 2 TIMES DAILY PRN
Status: DISCONTINUED | OUTPATIENT
Start: 2024-06-25 | End: 2024-06-27 | Stop reason: HOSPADM

## 2024-06-25 RX ORDER — METOPROLOL TARTRATE 100 MG/1
100 TABLET ORAL DAILY
Status: DISCONTINUED | OUTPATIENT
Start: 2024-06-25 | End: 2024-06-26

## 2024-06-25 RX ORDER — ACETAMINOPHEN 325 MG/1
650 TABLET ORAL EVERY 6 HOURS PRN
Status: DISCONTINUED | OUTPATIENT
Start: 2024-06-25 | End: 2024-06-27 | Stop reason: HOSPADM

## 2024-06-25 RX ORDER — HYDROCHLOROTHIAZIDE 12.5 MG/1
12.5 TABLET ORAL DAILY
Status: DISCONTINUED | OUTPATIENT
Start: 2024-06-25 | End: 2024-06-26

## 2024-06-25 RX ADMIN — HYDRALAZINE HYDROCHLORIDE 20 MG: 20 INJECTION INTRAMUSCULAR; INTRAVENOUS at 12:50

## 2024-06-25 RX ADMIN — ASPIRIN 324 MG: 81 TABLET, CHEWABLE ORAL at 10:14

## 2024-06-25 RX ADMIN — LISINOPRIL 40 MG: 40 TABLET ORAL at 17:18

## 2024-06-25 RX ADMIN — SODIUM CHLORIDE 5 MG/HR: 9 INJECTION, SOLUTION INTRAVENOUS at 15:23

## 2024-06-25 RX ADMIN — Medication 10 ML: at 20:16

## 2024-06-25 RX ADMIN — METOPROLOL TARTRATE 100 MG: 100 TABLET, FILM COATED ORAL at 17:18

## 2024-06-25 RX ADMIN — HYDROCHLOROTHIAZIDE 12.5 MG: 12.5 TABLET ORAL at 17:31

## 2024-06-25 RX ADMIN — ACETAMINOPHEN 650 MG: 325 TABLET ORAL at 21:02

## 2024-06-25 NOTE — ED NOTES
Falguni Laureano    Nursing Report ED to Floor:  Mental status: alert and oriented x4  Ambulatory status: adlib  Oxygen Therapy:  room air  Cardiac Rhythm: normal sinus  Admitted from: ed  Safety Concerns:  na  Social Issues: na  ED Room #:  ed    ED Nurse Phone Extension - [phone] or may call [phone].      HPI:   Chief Complaint   Patient presents with    Hypertension       Past Medical History:  Past Medical History:   Diagnosis Date    Hypertension         Past Surgical History:  Past Surgical History:   Procedure Laterality Date    HIP FRACTURE SURGERY  2020        Admitting Doctor:   Timoteo Hanks MD    Consulting Provider(s):  Consults       No orders found from 5/27/2024 to 6/26/2024.             Admitting Diagnosis:   The primary encounter diagnosis was Hypertensive urgency. Diagnoses of Generalized headache and Old cerebrovascular accident (CVA) without late effect were also pertinent to this visit.    Most Recent Vitals:   Vitals:    06/25/24 1445 06/25/24 1450 06/25/24 1452 06/25/24 1457   BP:       BP Location:       Patient Position:       Pulse: 98 105 103 103   Resp:       Temp:       TempSrc:       SpO2: 99% 99% 98% 97%   Weight:       Height:           Active LDAs/IV Access:   Lines, Drains & Airways       Active LDAs       Name Placement date Placement time Site Days    Peripheral IV 06/25/24 1005 Right Antecubital 06/25/24  1005  Antecubital  less than 1                    Labs (abnormal labs have a star):   Labs Reviewed   COMPREHENSIVE METABOLIC PANEL - Abnormal; Notable for the following components:       Result Value    Creatinine 1.05 (*)     eGFR 57.3 (*)     All other components within normal limits    Narrative:     GFR Normal >60  Chronic Kidney Disease <60  Kidney Failure <15     TROPONIN - Abnormal; Notable for the following components:    HS Troponin T 14 (*)     All other components within normal limits    Narrative:     High Sensitive Troponin T Reference Range:  <14.0 ng/L- Negative Female  for AMI  <22.0 ng/L- Negative Male for AMI  >=14 - Abnormal Female indicating possible myocardial injury.  >=22 - Abnormal Male indicating possible myocardial injury.   Clinicians would have to utilize clinical acumen, EKG, Troponin, and serial changes to determine if it is an Acute Myocardial Infarction or myocardial injury due to an underlying chronic condition.        HIGH SENSITIVITIY TROPONIN T 2HR - Abnormal; Notable for the following components:    HS Troponin T 14 (*)     All other components within normal limits    Narrative:     High Sensitive Troponin T Reference Range:  <14.0 ng/L- Negative Female for AMI  <22.0 ng/L- Negative Male for AMI  >=14 - Abnormal Female indicating possible myocardial injury.  >=22 - Abnormal Male indicating possible myocardial injury.   Clinicians would have to utilize clinical acumen, EKG, Troponin, and serial changes to determine if it is an Acute Myocardial Infarction or myocardial injury due to an underlying chronic condition.        TSH - Normal   CBC WITH AUTO DIFFERENTIAL - Normal   BNP (IN-HOUSE) - Normal    Narrative:     This assay is used as an aid in the diagnosis of individuals suspected of having heart failure. It can be used as an aid in the diagnosis of acute decompensated heart failure (ADHF) in patients presenting with signs and symptoms of ADHF to the emergency department (ED). In addition, NT-proBNP of <300 pg/mL indicates ADHF is not likely.    Age Range Result Interpretation  NT-proBNP Concentration (pg/mL:      <50             Positive            >450                   Gray                 300-450                    Negative             <300    50-75           Positive            >900                  Gray                300-900                  Negative            <300      >75             Positive            >1800                  Gray                300-1800                  Negative            <300   RAINBOW DRAW    Narrative:     The following orders  were created for panel order [place] Draw.  Procedure                               Abnormality         Status                     ---------                               -----------         ------                     Green Top (Gel)[[id number]]                                  Final result               Lavender Top[[id number]]                                     Final result               Gold Top - SST[[id number]]                                   Final result               Gray Top[[id number]]                                         Final result               Light Blue Top[[id number]]                                   Final result                 Please view results for these tests on the individual orders.   CBC AND DIFFERENTIAL    Narrative:     The following orders were created for panel order CBC & Differential.  Procedure                               Abnormality         Status                     ---------                               -----------         ------                     CBC Auto Differential[[id number]]        Normal              Final result                 Please view results for these tests on the individual orders.   GREEN TOP   LAVENDER TOP   GOLD TOP - SST   GRAY TOP   LIGHT BLUE TOP       Meds Given in ED:   Medications   sodium chloride 0.9 % flush 10 mL (has no administration in time range)   niCARdipine (CARDENE) 25mg in 250mL NS infusion (0 mg/hr Intravenous Hold 6/25/24 1403)   aspirin chewable tablet 324 mg (324 mg Oral Given 6/25/24 1014)   hydrALAZINE (APRESOLINE) injection 20 mg (20 mg Intravenous Given 6/25/24 1250)     niCARdipine, 5-15 mg/hr, Last Rate: Stopped (06/25/24 1403)         Last NIH score:                                                          Dysphagia screening results:        [place] Coma Scale:  No data recorded     CIWA:        Restraint Type:            Isolation Status:  No active isolations

## 2024-06-25 NOTE — H&P
[facility]  HISTORY AND PHYSICAL    Patient Name: Falguni Laureano  : 1955  MRN: [id number]  Primary Care Physician: Fiona Fitch PA-C  Date of admission: 2024      Subjective   Subjective     Chief Complaint: Elevated blood pressure    HPI:  Falguni Laureano is a 69 y.o. female  with history of hypertension, hyperlipidemia presents to the ED with from PCPs office with concerns for poorly controlled BP.  Patient  has a history of poorly controlled hypertension, she presented to her PCPs for routine follow-up, was found to have BP of 210/110 and as such she was advised to come to the ED.  Patient does have history of headaches, however denies having any currently, she denies any fevers or chills, no nausea no vomiting, no vision changes.  On arrival here her BP was 224/141.  Basic labs with CBC and CMP were essentially normal, CT head was unremarkable patient was given a dose of 20 mg of IV hydralazine with some good response, Cardene gtt. was ordered but not started and hospital medicine was asked to admit.      Personal History     Past Medical History:   Diagnosis Date    Hypertension            Past Surgical History:   Procedure Laterality Date    HIP FRACTURE SURGERY         Family History: family history includes Breast cancer in her paternal aunt; No Known Problems in her daughter, father, maternal aunt, maternal grandfather, maternal grandmother, maternal uncle, mother, paternal grandfather, paternal grandmother, and paternal uncle.     Social History:  reports that she has never smoked. She has never used smokeless tobacco. She reports that she does not currently use alcohol. She reports that she does not use drugs.  Social History     Social History Narrative    Not on file       Medications:  Available home medication information reviewed.  acetaminophen, atorvastatin, calcium carbonate, lisinopril, metoprolol tartrate, and omeprazole    No  Known Allergies    Objective   Objective     Vital Signs:   Temp:  [98.5 °F (36.9 °C)-98.6 °F (37 °C)] 98.5 °F (36.9 °C)  Heart Rate:  [] 103  Resp:  [16-18] 18  BP: (155-224)/() 161/109       Physical Exam   Constitutional: Awake, alert  Eyes: PERRLA, sclerae anicteric, no conjunctival injection  HENT: NCAT, mucous membranes moist  Neck: Supple, no thyromegaly, no lymphadenopathy, trachea midline  Respiratory: Clear to auscultation bilaterally, nonlabored respirations   Cardiovascular: RRR, no murmurs, rubs, or gallops, palpable pedal pulses bilaterally  Gastrointestinal: Positive bowel sounds, soft, nontender, nondistended  Musculoskeletal: No bilateral ankle edema, no clubbing or cyanosis to extremities  Psychiatric: Appropriate affect, cooperative  Neurologic: Oriented x 3, strength symmetric in all extremities, Cranial Nerves grossly intact to confrontation, speech clear  Skin: No rashes     Result Review:  I have personally reviewed the results from the time of this admission to 6/25/2024 15:16 EDT and agree with these findings:  [x]  Laboratory list / accordion  []  Microbiology  [x]  Radiology  []  EKG/Telemetry   []  Cardiology/Vascular   []  Pathology  []  Old records  []  Other:  Most notable findings include:     LAB RESULTS:      Lab 06/25/24  1005   WBC 4.59   HEMOGLOBIN 13.7   HEMATOCRIT 42.2   PLATELETS 189   NEUTROS ABS 2.68   IMMATURE GRANS (ABS) 0.01   LYMPHS ABS 1.26   MONOS ABS 0.52   EOS ABS 0.11   MCV 81.8         Lab 06/25/24  1005   SODIUM 139   POTASSIUM 4.0   CHLORIDE 103   CO2 29.0   ANION GAP 7.0   BUN 15   CREATININE 1.05*   EGFR 57.3*   GLUCOSE 97   CALCIUM 9.7   TSH 2.150         Lab 06/25/24  1005   TOTAL PROTEIN 7.3   ALBUMIN 4.3   GLOBULIN 3.0   ALT (SGPT) 16   AST (SGOT) 17   BILIRUBIN 0.5   ALK PHOS 97         Lab 06/25/24  1258 06/25/24  1052   PROBNP  --  338.8   HSTROP T 14* 14*                     Microbiology Results (last 10 days)       ** No results found for  the last 240 hours. **            CT Head Without Contrast    Result Date: 6/25/2024  CT HEAD WO CONTRAST Date of Exam: 6/25/2024 1:56 PM EDT Indication: Hypertension, headache. Comparison: None available. Technique: Axial CT images were obtained of the head without contrast administration.  Automated exposure control and iterative construction methods were used. Findings: Parenchyma:No acute intraparenchymal hemorrhage. No loss of gray-white differentiation to suggest large territory infarct. Moderate parenchymal volume loss. Scattered periventricular and subcortical white matter hypodensities, nonspecific, but most often  consistent with small vessel ischemic changes. 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 No midline shift or herniation. Ventricles and extra axial spaces:Prominent ventricles and sulci secondary to volume loss. No extra axial fluid collection seen. Other:Orbits are grossly intact. Scattered paranasal sinus mucosal thickening. Mastoid air cells are clear. Calvarium is intact. Intracranial atherosclerotic calcification is present.     Impression: Impression: No evidence of acute acute hemorrhage or large territory infarct. 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 Electronically Signed: Jamil Lockhart MD  6/25/2024 2:20 PM EDT  Workstation ID: [id number]    XR Chest 1 View    Result Date: 6/25/2024  XR CHEST 1 VW Date of Exam: 6/25/2024 10:02 AM EDT Indication: Chest Pain Chest Pain Protocol, hypertension Comparison: None available. Findings: Heart and pulmonary vessels appear within normal limits. Thoracic aorta is tortuous and ectatic. There is somewhat low lung volumes with poor inspiration. There is mild linear scar or atelectasis of the right lung base. The left lung is clear. There are no effusions.    Impression: Impression: Mild scar or atelectasis right lung base. Electronically  Signed: Bushra Watson MD  6/25/2024 10:43 AM EDT  Workstation ID: [id number]         Assessment & Plan   Assessment & Plan       Hypertension    Dyslipidemia    Hypertensive urgency    69-year-old female with history of hypertension, hyperlipidemia presents to the ED with from PCPs office with concerns for poorly controlled BP.  Admitted with hypertensive urgency.    Hypertensive urgency  History of hypertension  -/141 on presentation, patient is s/p a dose of 20 mg IV hydralazine with good response, Cardene gtt. has been ordered but not started   -Patient is on 40 mg of lisinopril and metoprolol 100 mg daily, we will continue this and add HCTZ 12.5mg.  Continue monitoring, and adjust as warranted, goal SBP tonight 150-160    Hyperlipidemia  -Continue statin    GERD  -Continue PPI    VTE Prophylaxis:  Mechanical VTE prophylaxis orders are signed & held.      CODE STATUS:    Code Status and Medical Interventions:   Ordered at: 06/25/24 1511     Medical Intervention Limits:    No intubation (DNI)     Level Of Support Discussed With:    Patient     Code Status (Patient has no pulse and is not breathing):    No CPR (Do Not Attempt to Resuscitate)     Medical Interventions (Patient has pulse or is breathing):    Limited Support     Expected Discharge   Expected Discharge Date: 6/27/2024; Expected Discharge Time:      Timoteo Hanks MD  06/25/24

## 2024-06-25 NOTE — PROGRESS NOTES
Office Note     Name: Falguni Laureano    : 1955     MRN: [id number]     Chief Complaint  Hypertension    Subjective     History of Present Illness:  Falguni Laureano is a 69 y.o. female who presents today for elevated blood pressure. She is accompanied by her grandson today who drove her here.  Patient presents today with complaints of elevated blood pressure and headaches.  She states has been going on for the past 3 days.  She states her headaches have improved some today.  She denies any visual changes weakness or numbness or tingling.  Patient has not been checking her blood pressure at home as her blood pressure cuff has been out of batteries.  She states she can tell when it is high when she gets headaches.  She states she is taking lisinopril 40 mg daily and metoprolol 100 mg daily.  She is already taken this earlier today.  She will check patient's blood pressure was 210/110.  It was rechecked again and was 190/120.      Review of Systems:   Review of Systems   Eyes:  Negative for blurred vision.   Respiratory:  Negative for cough and shortness of breath.    Cardiovascular:  Negative for chest pain and palpitations.   Neurological:  Positive for headache. Negative for weakness and numbness.       Past Medical History:   Past Medical History:   Diagnosis Date    Hypertension        Past Surgical History:   Past Surgical History:   Procedure Laterality Date    HIP FRACTURE SURGERY         Family History:   Family History   Problem Relation Age of Onset    No Known Problems Mother     No Known Problems Father     No Known Problems Daughter     No Known Problems Maternal Grandmother     No Known Problems Paternal Grandmother     No Known Problems Maternal Aunt     Breast cancer Paternal Aunt     No Known Problems Maternal Grandfather     No Known Problems Paternal Grandfather     No Known Problems Maternal Uncle     No Known Problems Paternal Uncle     Ovarian cancer Neg Hx        Social History:  "  Social History     Socioeconomic History    Marital status:    Tobacco Use    Smoking status: Never    Smokeless tobacco: Never   Vaping Use    Vaping status: Never Used   Substance and Sexual Activity    Alcohol use: Not Currently    Drug use: Never    Sexual activity: Defer       Immunizations:   There is no immunization history on file for this patient.     Medications:   No current facility-administered medications for this visit.    Current Outpatient Medications:     atorvastatin (LIPITOR) 10 MG tablet, Take 1 tablet by mouth Every Night., Disp: 90 tablet, Rfl: 3    lisinopril (PRINIVIL,ZESTRIL) 10 MG tablet, TAKE 3 TABLETS BY MOUTH DAILY, Disp: 270 tablet, Rfl: 0    metoprolol tartrate (LOPRESSOR) 100 MG tablet, Take 1 tablet by mouth Daily., Disp: 90 tablet, Rfl: 0    omeprazole (priLOSEC) 40 MG capsule, TAKE 1 CAPSULE BY MOUTH DAILY, Disp: 90 capsule, Rfl: 0    Facility-Administered Medications Ordered in Other Visits:     aspirin chewable tablet 324 mg, 324 mg, Oral, Once, Caleb Najera,     sodium chloride 0.9 % flush 10 mL, 10 mL, Intravenous, PRN, Caleb Najera,     Allergies:   No Known Allergies    Objective     Vital Signs  BP (!) 190/120   Pulse 71   Wt 73.2 kg (161 lb 6.4 oz)   SpO2 99%   BMI 30.50 kg/m²   Estimated body mass index is 30.5 kg/m² as calculated from the following:    Height as of 2/9/24: 154.9 cm (61\").    Weight as of this encounter: 73.2 kg (161 lb 6.4 oz).           Physical Exam  Vitals reviewed.   Constitutional:       General: She is not in acute distress.     Appearance: Normal appearance.   HENT:      Head: Normocephalic and atraumatic.   Eyes:      Pupils: Pupils are equal, round, and reactive to light.   Cardiovascular:      Rate and Rhythm: Normal rate and regular rhythm.      Pulses: Normal pulses.      Heart sounds: Normal heart sounds.   Pulmonary:      Effort: Pulmonary effort is normal.      Breath sounds: Normal breath sounds. "   Abdominal:      General: Abdomen is flat. Bowel sounds are normal.   Skin:     General: Skin is warm.   Neurological:      General: No focal deficit present.      Mental Status: She is alert and oriented to person, place, and time.   Psychiatric:         Mood and Affect: Mood normal.            Results:  Recent Results (from the past 24 hour(s))   ECG 12 Lead Other; HIGH BLOOD PRESSURE    Collection Time: 06/25/24  9:45 AM   Result Value Ref Range    QT Interval 426 ms    QTC Interval 435 ms        Assessment and Plan     Assessment/Plan:  Diagnoses and all orders for this visit:    1. Hypertensive urgency (Primary)    2. Hypertension, unspecified type  -     Comprehensive Metabolic Panel; Future  -     CBC (No Diff); Future      Patient's blood pressure is under very poor control.  With her symptoms today and level of elevated blood pressure even with taking her medication I do not feel comfortable sending her home.  I have advised that she go to the ER for evaluation urgently.  Patient's grandson asks if I think that this is something that needs to be dealt with today and I explained that I do think it is an urgent matter sleeping her blood pressure this high can lead to poor outcomes.  Patient is compliant with this and they both state that they will go to the ER now.  Patient declines ambulance.  Her grandson will drive her.  They have no further questions at this time.  I did ask them to follow-up within 1 week upon discharge.  They verbalized understanding and agreed to plan.      Follow Up  No follow-ups on file.    Fiona Fitch PA-C   [facility]

## 2024-06-25 NOTE — Clinical Note
Level of Care: Telemetry [5]   Diagnosis: Hypertensive urgency [291808]   Admitting Physician: BRENDAN GREENBERG [[id number]]

## 2024-06-25 NOTE — ED PROVIDER NOTES
[place]    EMERGENCY DEPARTMENT ENCOUNTER      Pt Name: Falguni Laureano  MRN: [id number]  YOB: 1955  Date of evaluation: 6/25/2024  Provider: Caleb Najera DO    CHIEF COMPLAINT       Chief Complaint   Patient presents with    Hypertension       HPI  Stated Reason for Visit: PT TO ED FROM [facility] FOR COMPLAINTS OF HIGH BLOOD PRESSURE. PT STATES SHE HAS HAD TROUBLE CONTROLLING HER BLOOD PRESSURE. TAKES 40MG OF LISINOPRIL AND 100MG OF METOPROLOL DAILY. STATES NORMALLY HAVING HEADACHES WHEN BP IS HIGH. DENIES HEADACHE AT THIS TIME. DENIES NUMBNESS OR TINGLING. History Obtained From: patient;family       HISTORY OF PRESENT ILLNESS  (Location/Symptom, Timing/Onset, Context/Setting, Quality, Duration, Modifying Factors, Severity.)   Falguni Laureano is a 69 y.o. female who presents to the emergency department from a clinic office with a concern for elevated blood pressures.  She notes she has a history of poorly controlled blood pressure and has been compliant with her antihypertensives.  She is on lisinopril 40 mg, metoprolol 100 mg, has intermittent headaches but recently has not had any.  She denies any vision changes, unilateral weakness, numbness or tingling.  She follows with her PCP, does not follow with a cardiologist or hypertension specialist.  She denies any chest pain, shortness of breath, fever chills or recent illness.  No difficulty with ambulation.  States her numbers have been elevated and has been present for quite some time.  She notes compliance with her medications.  She denies any other acute systemic complaints at this time.  No recent illness, fever chills, nausea vomiting or diarrhea.      Nursing notes were reviewed.      PAST MEDICAL HISTORY     Past Medical History:   Diagnosis Date    Hypertension          SURGICAL HISTORY       Past Surgical History:   Procedure Laterality Date    HIP FRACTURE SURGERY  2020         CURRENT MEDICATIONS       Current  Facility-Administered Medications:     niCARdipine (CARDENE) 25mg in 250mL NS infusion, 5-15 mg/hr, Intravenous, Titrated, Caleb Najera DO, Held at 06/25/24 1403    sodium chloride 0.9 % flush 10 mL, 10 mL, Intravenous, PRN, Caleb Najera DO    Current Outpatient Medications:     acetaminophen (TYLENOL) 500 MG tablet, Take 1 tablet by mouth Every 6 (Six) Hours As Needed for Mild Pain., Disp: , Rfl:     atorvastatin (LIPITOR) 10 MG tablet, Take 1 tablet by mouth Every Night., Disp: 90 tablet, Rfl: 3    calcium carbonate (OS-EVELIO) 600 MG tablet, Take 1 tablet by mouth Daily., Disp: , Rfl:     lisinopril (PRINIVIL,ZESTRIL) 10 MG tablet, TAKE 3 TABLETS BY MOUTH DAILY (Patient taking differently: Take 4 tablets by mouth Daily.), Disp: 270 tablet, Rfl: 0    metoprolol tartrate (LOPRESSOR) 100 MG tablet, Take 1 tablet by mouth Daily., Disp: 90 tablet, Rfl: 0    omeprazole (priLOSEC) 40 MG capsule, TAKE 1 CAPSULE BY MOUTH DAILY, Disp: 90 capsule, Rfl: 0    ALLERGIES     Patient has no known allergies.    FAMILY HISTORY       Family History   Problem Relation Age of Onset    No Known Problems Mother     No Known Problems Father     No Known Problems Daughter     No Known Problems Maternal Grandmother     No Known Problems Paternal Grandmother     No Known Problems Maternal Aunt     Breast cancer Paternal Aunt     No Known Problems Maternal Grandfather     No Known Problems Paternal Grandfather     No Known Problems Maternal Uncle     No Known Problems Paternal Uncle     Ovarian cancer Neg Hx           SOCIAL HISTORY       Social History     Socioeconomic History    Marital status:    Tobacco Use    Smoking status: Never    Smokeless tobacco: Never   Vaping Use    Vaping status: Never Used   Substance and Sexual Activity    Alcohol use: Not Currently    Drug use: Never    Sexual activity: Defer         PHYSICAL EXAM    (up to 7 for level 4, 8 or more for level 5)     Vitals:    06/25/24 1330 06/25/24  1430 06/25/24 1431 06/25/24 1432   BP: 155/97 (!) 161/109     BP Location:       Patient Position:       Pulse: 96 103 103 98   Resp:       Temp:       TempSrc:       SpO2: 100% 98% 96% 97%   Weight:       Height:           Physical Exam  General : Patient is awake, alert, oriented, in no acute distress, nontoxic appearing  HEENT: Pupils are equally round, EOMI, conjunctivae clear  Neck: Neck is supple, full range of motion, trachea midline  Cardiac: Heart regular rate, rhythm, no murmurs, rubs, or gallops  Lungs: Lungs are clear to auscultation, there is no wheezing, rhonchi, or rales. There is no use of accessory muscles  Abdomen: Abdomen is soft, nontender, nondistended. There are no firm or pulsatile masses, no rebound rigidity or guarding  Musculoskeletal: 5 out of 5 strength in all 4 extremities.  No focal muscle deficits are appreciated  Neuro: GCS 15, no focal neurological deficit examination, motor intact, sensory intact, level of consciousness is normal, cerebellar function is normalevidence of incontinence or loss of bowel or bladder function, no saddle anesthesia noted   Dermatology: Skin is warm and dry  Psych: Mentation is grossly normal, cognition is grossly normal. Affect is appropriate      DIAGNOSTIC RESULTS     EKG:  All EKGs are interpreted by the Emergency Department Physician who either signs or Co-signs this chart in the absence of a cardiologist.    ECG 12 Lead Other; HIGH BLOOD PRESSURE   Final Result   Test Reason : Other~   Blood Pressure :   */*   mmHG   Vent. Rate :  63 BPM     Atrial Rate :  63 BPM      P-R Int : 134 ms          QRS Dur :  70 ms       QT Int : 426 ms       P-R-T Axes :  21 -23 -21 degrees      QTc Int : 435 ms      Normal sinus rhythm   Moderate voltage criteria for LVH, may be normal variant   Nonspecific T wave abnormality   Abnormal ECG   No previous ECGs available   Confirmed by ZONIA WATTS MD ([id number]) on 6/25/2024 9:48:08 AM      Referred By: RALPH            Confirmed By: ZONIA WATTS MD          RADIOLOGY:     [x] Radiologist's Report Reviewed:  CT Head Without Contrast   Final Result   Impression:   No evidence of acute acute hemorrhage or large territory infarct.      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            Electronically Signed: Jamil Lockhart MD     6/25/2024 2:20 PM EDT     Workstation ID: [id number]      XR Chest 1 View   Final Result   Impression:   Mild scar or atelectasis right lung base.         Electronically Signed: Bushra Watson MD     6/25/2024 10:43 AM EDT     Workstation ID: [id number]          I ordered and independently reviewed the above noted radiographic studies.      I viewed images of chest x-ray which showed mild scarring in the right lung base per my independent interpretation.    See radiologist's dictation for official interpretation.      ED BEDSIDE ULTRASOUND:   Performed by ED Physician - none    LABS:    I have reviewed and interpreted all of the currently available lab results from this visit (if applicable):  Results for orders placed or performed during the hospital encounter of 06/25/24   Comprehensive Metabolic Panel    Specimen: Blood   Result Value Ref Range    Glucose 97 65 - 99 mg/dL    BUN 15 8 - 23 mg/dL    Creatinine 1.05 (H) 0.57 - 1.00 mg/dL    Sodium 139 136 - 145 mmol/L    Potassium 4.0 3.5 - 5.2 mmol/L    Chloride 103 98 - 107 mmol/L    CO2 29.0 22.0 - 29.0 mmol/L    Calcium 9.7 8.6 - 10.5 mg/dL    Total Protein 7.3 6.0 - 8.5 g/dL    Albumin 4.3 3.5 - 5.2 g/dL    ALT (SGPT) 16 1 - 33 U/L    AST (SGOT) 17 1 - 32 U/L    Alkaline Phosphatase 97 39 - 117 U/L    Total Bilirubin 0.5 0.0 - 1.2 mg/dL    Globulin 3.0 gm/dL    A/G Ratio 1.4 g/dL    BUN/Creatinine Ratio 14.3 7.0 - 25.0    Anion Gap 7.0 5.0 - 15.0 mmol/L    eGFR 57.3 (L) >60.0 mL/min/1.73   High Sensitivity Troponin T    Specimen: Blood   Result Value Ref Range    HS Troponin T 14 (H)  <14 ng/L   TSH    Specimen: Blood   Result Value Ref Range    TSH 2.150 0.270 - 4.200 uIU/mL   CBC Auto Differential    Specimen: Blood   Result Value Ref Range    WBC 4.59 3.40 - 10.80 10*3/mm3    RBC 5.16 3.77 - 5.28 10*6/mm3    Hemoglobin 13.7 12.0 - 15.9 g/dL    Hematocrit 42.2 34.0 - 46.6 %    MCV 81.8 79.0 - 97.0 fL    MCH 26.6 26.6 - 33.0 pg    MCHC 32.5 31.5 - 35.7 g/dL    RDW 12.3 12.3 - 15.4 %    RDW-SD 37.2 37.0 - 54.0 fl    MPV 9.6 6.0 - 12.0 fL    Platelets 189 140 - 450 10*3/mm3    Neutrophil % 58.4 42.7 - 76.0 %    Lymphocyte % 27.5 19.6 - 45.3 %    Monocyte % 11.3 5.0 - 12.0 %    Eosinophil % 2.4 0.3 - 6.2 %    Basophil % 0.2 0.0 - 1.5 %    Immature Grans % 0.2 0.0 - 0.5 %    Neutrophils, Absolute 2.68 1.70 - 7.00 10*3/mm3    Lymphocytes, Absolute 1.26 0.70 - 3.10 10*3/mm3    Monocytes, Absolute 0.52 0.10 - 0.90 10*3/mm3    Eosinophils, Absolute 0.11 0.00 - 0.40 10*3/mm3    Basophils, Absolute 0.01 0.00 - 0.20 10*3/mm3    Immature Grans, Absolute 0.01 0.00 - 0.05 10*3/mm3    nRBC 0.0 0.0 - 0.2 /100 WBC   High Sensitivity Troponin T 2Hr    Specimen: Blood   Result Value Ref Range    HS Troponin T 14 (H) <14 ng/L    Troponin T Delta 0 >=-4 - <+4 ng/L   BNP    Specimen: Blood   Result Value Ref Range    proBNP 338.8 0.0 - 900.0 pg/mL   ECG 12 Lead Other; HIGH BLOOD PRESSURE   Result Value Ref Range    QT Interval 426 ms    QTC Interval 435 ms   Green Top (Gel)   Result Value Ref Range    Extra Tube Hold for add-ons.    Lavender Top   Result Value Ref Range    Extra Tube hold for add-on    Gold Top - SST   Result Value Ref Range    Extra Tube Hold for add-ons.    Gray Top   Result Value Ref Range    Extra Tube Hold for add-ons.    Light Blue Top   Result Value Ref Range    Extra Tube Hold for add-ons.         If labs were ordered, I independently reviewed the results and considered them in treating the patient.      EMERGENCY DEPARTMENT COURSE and DIFFERENTIAL DIAGNOSIS/MDM:   Vitals:  AS OF 14:42  EDT    BP - (!) 161/109  HR - 98  TEMP - 98.5 °F (36.9 °C) (Oral)  O2 SATS - 97%      Orders placed during this visit:  Orders Placed This Encounter   Procedures    XR Chest 1 View    CT Head Without Contrast    [place] Draw    Comprehensive Metabolic Panel    High Sensitivity Troponin T    TSH    CBC Auto Differential    High Sensitivity Troponin T 2Hr    BNP    Continuous Pulse Oximetry    Vital Signs    Oxygen Therapy- Nasal Cannula; Titrate 1-6 LPM Per SpO2; 90 - 95%    ECG 12 Lead Other; HIGH BLOOD PRESSURE    ECG 12 Lead Chest Pain    Insert Peripheral IV    Initiate Observation Status    ED Bed Request    CBC & Differential    Green Top (Gel)    Lavender Top    Gold Top - SST    Gray Top    Light Blue Top       All labs have been independently reviewed by me.  All radiology studies have been reviewed by me and the radiologist dictating the report.  All EKG's have been independently viewed and interpreted by me.      Discussion below represents my analysis of pertinent findings related to patient's condition, differential diagnosis, treatment plan and final disposition.    Differential diagnosis:  The differential diagnosis associated with the patient's presentation includes: Uncontrolled hypertension, thyroid dysfunction, renal dysfunction, cardiac dysfunction, congestive heart failure    Additional sources  Discussed/ obtained information from independent historians:   [] Spouse  [] Parent  [] Family member  [] Friend  [] EMS   [] Other:    External (non-ED) record review:   [] Inpatient record:   [] Office record:   [] Outpatient record:   [] Prior Outpatient labs:   [] Prior Outpatient radiology:   [] Primary Care record:   [] Outside ED record:   [] Other:     Patient's care impacted by:   [] Diabetes  [x] Hypertension  [] CHF  [] Hyperlipidemia  [] Coronary Artery Disease   [] COPD   [] Cancer   [] Tobacco Abuse   [] Substance Abuse    [] Other:     Care significantly affected by Social Determinants of  Health (housing and economic circumstances, unemployment)    [] Yes     [x] No   If yes, Patient's care significantly limited by Social Determinants of Health including:   [] Inadequate housing   [] Low income   [] Alcoholism and drug addiction in family   [] Problems related to primary support group   [] Unemployment   [] Problems related to employment   [] Other Social Determinants of Health:       MEDICATIONS ADMINISTERED IN ED:  Medications   sodium chloride 0.9 % flush 10 mL (has no administration in time range)   niCARdipine (CARDENE) 25mg in 250mL NS infusion (0 mg/hr Intravenous Hold 6/25/24 1403)   aspirin chewable tablet 324 mg (324 mg Oral Given 6/25/24 1014)   hydrALAZINE (APRESOLINE) injection 20 mg (20 mg Intravenous Given 6/25/24 1250)          BP Temp Temp src Heart Rate Resp SpO2 Device (Oxygen Therapy) Patient Position    1253 190/120 98.5 °F (36.9 °C) Oral 58 18 100 % room air Lying   1250 190/120 -- -- 64 -- -- -- --   1200 198/113 -- -- 63 -- 99 % -- --    3 more   3 more  3 more     0935 224/141 98.6 °F (37 °C) Oral 70 16 99 % room air Sitting         This is a pleasant 69-year-old female who has fully controlled hypertension, blood pressure 190/120 and initial presentation, otherwise asymptomatic.  We did obtain IV, labs, imaging for further assessment, was given a dose of hydralazine in the ED. pressures are significantly elevated between 190-224/.  We did obtain a CT head, basic labs and imaging which are reviewed as above.  Troponin is 14 on repeat, thyroid function is normal as well as her BNP levels.  Kidney function craze 1.05.  She is not previously followed with a cardiology specialist, not had any prior echo or renal artery assessments.  Also obtain a CT head that she has had headaches associated with her significant hypertension.  Will start patient on a Cardene infusion secondary to her continued hypertensive urgency given her headache and chest pressure.  CT head with no  acute intracranial normality, appears to have some encephalomalacia from left old occipital infarct which the patient was not previously aware of.  Patient was updated these findings, we will plan for further workup and intervention for her hypertensive urgency, further discussion regarding her old stroke and continued management.  Patient is agreeable to this.  Case discussed with hospitalist Dr. Abdi.        PROCEDURES:  Procedures    CRITICAL CARE TIME    Total Critical Care time was 30 minutes, excluding separately reportable procedures.  Hypertensive urgency with blood pressures 220s over 120s requiring infusion of antihypertensives, recheck, neurological checks and assessments and discussions with consultants. There was a high probability of clinically significant/life threatening deterioration in the patient's condition which required my urgent intervention.      FINAL IMPRESSION      1. Hypertensive urgency    2. Generalized headache    3. Old cerebrovascular accident (CVA) without late effect          DISPOSITION/PLAN     ED Disposition       ED Disposition   Decision to Admit    Condition   --    Comment   Level of Care: Telemetry [5]   Diagnosis: Hypertensive urgency [318399]   Admitting Physician: BRENDAN GREENBERG [[id number]]               Comment: Please note this report has been produced using speech recognition software.      Caleb Najera DO  Attending Emergency Physician         Caleb Najera DO  06/25/24 0574

## 2024-06-26 PROCEDURE — 96366 THER/PROPH/DIAG IV INF ADDON: CPT

## 2024-06-26 PROCEDURE — G0378 HOSPITAL OBSERVATION PER HR: HCPCS

## 2024-06-26 PROCEDURE — 99232 SBSQ HOSP IP/OBS MODERATE 35: CPT | Performed by: INTERNAL MEDICINE

## 2024-06-26 RX ORDER — HYDROCHLOROTHIAZIDE 25 MG/1
12.5 TABLET ORAL ONCE
Status: COMPLETED | OUTPATIENT
Start: 2024-06-26 | End: 2024-06-26

## 2024-06-26 RX ORDER — CLONIDINE HYDROCHLORIDE 0.1 MG/1
0.1 TABLET ORAL EVERY 6 HOURS PRN
Status: DISCONTINUED | OUTPATIENT
Start: 2024-06-26 | End: 2024-06-27 | Stop reason: HOSPADM

## 2024-06-26 RX ORDER — HYDROCHLOROTHIAZIDE 12.5 MG/1
12.5 CAPSULE, GELATIN COATED ORAL DAILY
Qty: 60 CAPSULE | Refills: 5 | Status: CANCELLED | OUTPATIENT
Start: 2024-06-26

## 2024-06-26 RX ORDER — HYDROCHLOROTHIAZIDE 25 MG/1
25 TABLET ORAL DAILY
Status: DISCONTINUED | OUTPATIENT
Start: 2024-06-27 | End: 2024-06-27 | Stop reason: HOSPADM

## 2024-06-26 RX ORDER — METOPROLOL TARTRATE 100 MG/1
100 TABLET ORAL EVERY 12 HOURS SCHEDULED
Status: DISCONTINUED | OUTPATIENT
Start: 2024-06-26 | End: 2024-06-27 | Stop reason: HOSPADM

## 2024-06-26 RX ADMIN — HYDROCHLOROTHIAZIDE 12.5 MG: 25 TABLET ORAL at 15:13

## 2024-06-26 RX ADMIN — METOPROLOL TARTRATE 100 MG: 100 TABLET, FILM COATED ORAL at 20:06

## 2024-06-26 RX ADMIN — Medication 10 ML: at 20:07

## 2024-06-26 RX ADMIN — Medication 10 ML: at 08:09

## 2024-06-26 RX ADMIN — LISINOPRIL 40 MG: 40 TABLET ORAL at 08:09

## 2024-06-26 RX ADMIN — METOPROLOL TARTRATE 100 MG: 100 TABLET, FILM COATED ORAL at 08:09

## 2024-06-26 RX ADMIN — HYDROCHLOROTHIAZIDE 12.5 MG: 12.5 TABLET ORAL at 09:21

## 2024-06-26 RX ADMIN — PANTOPRAZOLE SODIUM 40 MG: 40 TABLET, DELAYED RELEASE ORAL at 05:05

## 2024-06-26 NOTE — PROGRESS NOTES
[facility] Medicine Services  PROGRESS NOTE    Patient Name: Falguni Laureano  : 1955  MRN: [id number]    Date of Admission: 2024  Primary Care Physician: Fiona Fitch PA-C    Subjective   Subjective     CC:  HTN     HPI:  BP came down but then mata again despite current meds.  She is back on cardene gtt.  States that she checks BP sometimes at home and never sees a systolic above the 160s.      No complaints currently.       Objective   Objective     Vital Signs:   Temp:  [98 °F (36.7 °C)-98.7 °F (37.1 °C)] 98.5 °F (36.9 °C)  Heart Rate:  [] 84  Resp:  [16-18] 16  BP: (106-224)/() 157/115     Physical Exam:  Gen:  WD/WN  Neuro: alert and oriented, clear speech, follows commands, grossly nonfocal  HEENT:  NC/AT PERRL, OP benign  Neck:  Supple, no LAD  Heart RRR no murmur, rub, or gallop  Abd:  Soft, nontender, no rebound or guarding, pos BS  Extrem:  No c/c/e      Results Reviewed:  LAB RESULTS:      Lab 24  1005   WBC 4.59   HEMOGLOBIN 13.7   HEMATOCRIT 42.2   PLATELETS 189   NEUTROS ABS 2.68   IMMATURE GRANS (ABS) 0.01   LYMPHS ABS 1.26   MONOS ABS 0.52   EOS ABS 0.11   MCV 81.8         Lab 24  1005   SODIUM 139   POTASSIUM 4.0   CHLORIDE 103   CO2 29.0   ANION GAP 7.0   BUN 15   CREATININE 1.05*   EGFR 57.3*   GLUCOSE 97   CALCIUM 9.7   TSH 2.150         Lab 24  1005   TOTAL PROTEIN 7.3   ALBUMIN 4.3   GLOBULIN 3.0   ALT (SGPT) 16   AST (SGOT) 17   BILIRUBIN 0.5   ALK PHOS 97         Lab 24  1258 24  1052   PROBNP  --  338.8   HSTROP T 14* 14*                 Brief Urine Lab Results       None            Microbiology Results Abnormal       None            CT Head Without Contrast    Result Date: 2024  CT HEAD WO CONTRAST Date of Exam: 2024 1:56 PM EDT Indication: Hypertension, headache. Comparison: None available. Technique: Axial CT images were obtained of the head without contrast administration.  Automated exposure  control and iterative construction methods were used. Findings: Parenchyma:No acute intraparenchymal hemorrhage. No loss of gray-white differentiation to suggest large territory infarct. Moderate parenchymal volume loss. Scattered periventricular and subcortical white matter hypodensities, nonspecific, but most often  consistent with small vessel ischemic changes. 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 No midline shift or herniation. Ventricles and extra axial spaces:Prominent ventricles and sulci secondary to volume loss. No extra axial fluid collection seen. Other:Orbits are grossly intact. Scattered paranasal sinus mucosal thickening. Mastoid air cells are clear. Calvarium is intact. Intracranial atherosclerotic calcification is present.     Impression: Impression: No evidence of acute acute hemorrhage or large territory infarct. 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 Electronically Signed: Jamil Lockhart MD  6/25/2024 2:20 PM EDT  Workstation ID: [id number]    XR Chest 1 View    Result Date: 6/25/2024  XR CHEST 1 VW Date of Exam: 6/25/2024 10:02 AM EDT Indication: Chest Pain Chest Pain Protocol, hypertension Comparison: None available. Findings: Heart and pulmonary vessels appear within normal limits. Thoracic aorta is tortuous and ectatic. There is somewhat low lung volumes with poor inspiration. There is mild linear scar or atelectasis of the right lung base. The left lung is clear. There are no effusions.    Impression: Impression: Mild scar or atelectasis right lung base. Electronically Signed: Bushra Watson MD  6/25/2024 10:43 AM EDT  Workstation ID: [id number]         Current medications:  Scheduled Meds:hydroCHLOROthiazide Oral, 12.5 mg, Oral, Daily  lisinopril, 40 mg, Oral, Q24H  metoprolol tartrate, 100 mg, Oral, Daily  pantoprazole, 40 mg, Oral, Q AM  sodium chloride, 10 mL, Intravenous,  Q12H      Continuous Infusions:niCARdipine, 5-15 mg/hr, Last Rate: Stopped (06/26/24 0503)      PRN Meds:.  acetaminophen    senna-docusate sodium **AND** polyethylene glycol **AND** bisacodyl **AND** bisacodyl    Calcium Replacement - Follow Nurse / BPA Driven Protocol    Magnesium Standard Dose Replacement - Follow Nurse / BPA Driven Protocol    ondansetron    Phosphorus Replacement - Follow Nurse / BPA Driven Protocol    Potassium Replacement - Follow Nurse / BPA Driven Protocol    sodium chloride    sodium chloride    sodium chloride    Assessment & Plan   Assessment & Plan     Active Hospital Problems    Diagnosis  POA    **Hypertensive urgency [I16.0]  Yes    Dyslipidemia [E78.5]  Yes    Hypertension [I10]  Yes      Resolved Hospital Problems   No resolved problems to display.        Brief Hospital Course to date:  Falguni Laureano is a 69 y.o. female  with HTN HL presents to the ED with from PCPs office with concerns for poorly controlled BP; was 210/110 at PCP office, then 224/141 in ED.       Hypertensive urgency  History of hypertension  -/141 on presentation, patient is s/p a dose of 20 mg IV hydralazine with good response,   -Patient is on 40 mg of lisinopril and metoprolol 100 mg daily - increased to BID.  Added HCTZ.  Still uncontrolled.  - watch overnight      Hyperlipidemia  -Continue statin     GERD  -Continue PPI      Expected Discharge Location and Transportation: home by car  Expected Discharge   Expected Discharge Date: 6/27/2024; Expected Discharge Time:      VTE Prophylaxis:  Mechanical VTE prophylaxis orders are present.         AM-PAC 6 Clicks Score (PT): 21 (06/26/24 0805)    CODE STATUS:   Code Status and Medical Interventions:   Ordered at: 06/25/24 1511     Medical Intervention Limits:    No intubation (DNI)     Level Of Support Discussed With:    Patient     Code Status (Patient has no pulse and is not breathing):    No CPR (Do Not Attempt to Resuscitate)     Medical Interventions  (Patient has pulse or is breathing):    Limited Support       Vika Tamayo MD  06/26/24

## 2024-06-26 NOTE — PLAN OF CARE
Problem: Adult Inpatient Plan of Care  Goal: Plan of Care Review  Outcome: Ongoing, Progressing  Goal: Patient-Specific Goal (Individualized)  Outcome: Ongoing, Progressing  Goal: Absence of Hospital-Acquired Illness or Injury  Outcome: Ongoing, Progressing  Intervention: Identify and Manage Fall Risk  Recent Flowsheet Documentation  Taken 6/26/2024 0000 by Mare Anna RN  Safety Promotion/Fall Prevention:   safety round/check completed   activity supervised   fall prevention program maintained  Taken 6/25/2024 2200 by Mare Anna RN  Safety Promotion/Fall Prevention:   activity supervised   fall prevention program maintained   safety round/check completed   nonskid shoes/slippers when out of bed  Taken 6/25/2024 2003 by Mare Anna RN  Safety Promotion/Fall Prevention:   activity supervised   fall prevention program maintained   safety round/check completed  Intervention: Prevent Skin Injury  Recent Flowsheet Documentation  Taken 6/26/2024 0000 by Mare Anna RN  Body Position: position changed independently  Skin Protection: adhesive use limited  Taken 6/25/2024 2200 by Mare Anna RN  Body Position: position changed independently  Skin Protection: adhesive use limited  Taken 6/25/2024 2003 by Mare Anan RN  Body Position: position changed independently  Skin Protection: adhesive use limited  Intervention: Prevent and Manage VTE (Venous Thromboembolism) Risk  Recent Flowsheet Documentation  Taken 6/26/2024 0000 by Mare Anna RN  Activity Management: up to bedside commode  Taken 6/25/2024 2200 by Mare Anna RN  Activity Management:   ambulated to bathroom   back to bed  Taken 6/25/2024 2003 by Mare Anna RN  Activity Management:   up ad maria teresa   activity encouraged  Range of Motion: active ROM (range of motion) encouraged  Intervention: Prevent Infection  Recent Flowsheet Documentation  Taken 6/26/2024 0000 by Wilfrido  ANGELA Garvey  Infection Prevention: environmental surveillance performed  Taken 6/25/2024 2200 by Mare Anna RN  Infection Prevention: environmental surveillance performed  Taken 6/25/2024 2003 by Mare Anna RN  Infection Prevention: environmental surveillance performed  Goal: Optimal Comfort and Wellbeing  Outcome: Ongoing, Progressing  Intervention: Monitor Pain and Promote Comfort  Recent Flowsheet Documentation  Taken 6/25/2024 2102 by Mare Anna RN  Pain Management Interventions: (prn tylenol given as ordered by provider) see MAR  Intervention: Provide Person-Centered Care  Recent Flowsheet Documentation  Taken 6/25/2024 2200 by Mare Anna RN  Trust Relationship/Rapport:   care explained   choices provided   reassurance provided  Taken 6/25/2024 2003 by Mare Anna RN  Trust Relationship/Rapport:   care explained   choices provided   questions answered   questions encouraged   reassurance provided  Goal: Readiness for Transition of Care  Outcome: Ongoing, Progressing   Goal Outcome Evaluation:

## 2024-06-26 NOTE — CASE MANAGEMENT/SOCIAL WORK
Discharge Planning Assessment  [facility]     Patient Name: Falguni Laureano  MRN: [id number]  Today's Date: 6/26/2024    Admit Date: 6/25/2024    Plan: Home   Discharge Needs Assessment       Row Name 06/26/24 1640       Living Environment    People in Home grandchild(elizabeth)    Name(s) of People in Home Gurpreet Melo  [phone]    Primary Care Provided by self    Provides Primary Care For no one, unable/limited ability to care for self    Family Caregiver if Needed grandchild(elizabeth), adult    Family Caregiver Names Gurpreet Melo  [phone]    Quality of Family Relationships unable to assess    Able to Return to Prior Arrangements yes       Transition Planning    Patient/Family Anticipates Transition to home with family    Patient/Family Anticipated Services at Transition     Transportation Anticipated family or friend will provide       Discharge Needs Assessment    Equipment Currently Used at Home bp cuff                   Discharge Plan       Row Name 06/26/24 6239       Plan    Plan Home    Patient/Family in Agreement with Plan yes    Plan Comments Spoke with Ms. Laureano in her room, to initiate discharge planning. She lives in [facility] and her grandson lives with her. She verified she has [employer] Medicare Replacement insurance and has prescription coverage. Ms. Laureano uses [facility] to get her prescriptions filled. Her PCP is Fiona Fitch PA-C. Prior to admission, she was independent with ADL's. She uses no medical equipment at home and is not current with home health. Her goal is to go home at discharge. Family will transport. CM will continue to follow throughout her hospital stay.    Final Discharge Disposition Code 01 - home or self-care                  Continued Care and Services - Admitted Since 6/25/2024    No active coordination exists for this encounter.       Expected Discharge Date and Time       Expected Discharge Date Expected Discharge Time     Jun 27, 2024            Demographic Summary       Row Name 06/26/24 1631       General Information    Admission Type observation    Arrived From emergency department    Referral Source admission list    Reason for Consult discharge planning    Preferred Language English       Contact Information    Permission Granted to Share Info With     Contact Information Obtained for                    Functional Status       Row Name 06/26/24 1637       Functional Status    Usual Activity Tolerance fair    Current Activity Tolerance fair       Functional Status, IADL    Medications independent    Meal Preparation independent    Housekeeping independent    Laundry independent    Shopping independent                   Psychosocial    No documentation.                  Abuse/Neglect    No documentation.                  Legal    No documentation.                  Substance Abuse    No documentation.                  Patient Forms    No documentation.                     Florecita Souza RN

## 2024-06-27 ENCOUNTER — HOME HEALTH ADMISSION (OUTPATIENT)
Dept: HOME HEALTH SERVICES | Facility: HOME HEALTHCARE | Age: 69
End: 2024-06-27
Payer: COMMERCIAL

## 2024-06-27 ENCOUNTER — DOCUMENTATION (OUTPATIENT)
Dept: HOME HEALTH SERVICES | Facility: HOME HEALTHCARE | Age: 69
End: 2024-06-27
Payer: MEDICARE

## 2024-06-27 ENCOUNTER — READMISSION MANAGEMENT (OUTPATIENT)
Dept: CALL CENTER | Facility: HOSPITAL | Age: 69
End: 2024-06-27
Payer: MEDICARE

## 2024-06-27 VITALS
HEART RATE: 69 BPM | OXYGEN SATURATION: 99 % | SYSTOLIC BLOOD PRESSURE: 123 MMHG | WEIGHT: 161 LBS | RESPIRATION RATE: 16 BRPM | BODY MASS INDEX: 31.61 KG/M2 | TEMPERATURE: 98.4 F | DIASTOLIC BLOOD PRESSURE: 92 MMHG | HEIGHT: 60 IN

## 2024-06-27 PROCEDURE — G0378 HOSPITAL OBSERVATION PER HR: HCPCS

## 2024-06-27 PROCEDURE — 99239 HOSP IP/OBS DSCHRG MGMT >30: CPT | Performed by: INTERNAL MEDICINE

## 2024-06-27 PROCEDURE — 25010000002 NICARDIPINE 2.5 MG/ML SOLUTION 10 ML VIAL: Performed by: INTERNAL MEDICINE

## 2024-06-27 PROCEDURE — 25810000003 SODIUM CHLORIDE 0.9 % SOLUTION 250 ML FLEX CONT: Performed by: INTERNAL MEDICINE

## 2024-06-27 RX ORDER — HYDROCHLOROTHIAZIDE 25 MG/1
25 TABLET ORAL DAILY
Qty: 30 TABLET | Refills: 5 | Status: SHIPPED | OUTPATIENT
Start: 2024-06-28

## 2024-06-27 RX ORDER — METOPROLOL TARTRATE 100 MG/1
100 TABLET ORAL EVERY 12 HOURS SCHEDULED
Qty: 60 TABLET | Refills: 5 | Status: SHIPPED | OUTPATIENT
Start: 2024-06-27

## 2024-06-27 RX ORDER — LISINOPRIL 10 MG/1
40 TABLET ORAL DAILY
Start: 2024-06-27

## 2024-06-27 RX ADMIN — PANTOPRAZOLE SODIUM 40 MG: 40 TABLET, DELAYED RELEASE ORAL at 05:18

## 2024-06-27 RX ADMIN — Medication 10 ML: at 09:07

## 2024-06-27 RX ADMIN — SODIUM CHLORIDE 5 MG/HR: 9 INJECTION, SOLUTION INTRAVENOUS at 03:02

## 2024-06-27 RX ADMIN — LISINOPRIL 40 MG: 40 TABLET ORAL at 09:07

## 2024-06-27 RX ADMIN — HYDROCHLOROTHIAZIDE 25 MG: 25 TABLET ORAL at 09:07

## 2024-06-27 RX ADMIN — METOPROLOL TARTRATE 100 MG: 100 TABLET, FILM COATED ORAL at 09:07

## 2024-06-27 NOTE — PROGRESS NOTES
Met with patient she is agreeable to [facility] services. Verified PCP. Jo-Ann MILLER, [facility]-Liaison
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
## 2024-06-27 NOTE — PLAN OF CARE
Problem: Adult Inpatient Plan of Care  Goal: Plan of Care Review  Outcome: Ongoing, Progressing  Flowsheets (Taken 6/27/2024 0416)  Progress: no change     Problem: Adult Inpatient Plan of Care  Goal: Absence of Hospital-Acquired Illness or Injury  Intervention: Identify and Manage Fall Risk  Recent Flowsheet Documentation  Taken 6/27/2024 0415 by Sheila Fitch RN  Safety Promotion/Fall Prevention:   activity supervised   assistive device/personal items within reach   safety round/check completed  Taken 6/27/2024 0215 by Sheila Fitch RN  Safety Promotion/Fall Prevention:   activity supervised   assistive device/personal items within reach   safety round/check completed  Taken 6/27/2024 0015 by Sheila Fitch RN  Safety Promotion/Fall Prevention:   assistive device/personal items within reach   activity supervised   safety round/check completed  Taken 6/26/2024 2215 by Sheila Fitch RN  Safety Promotion/Fall Prevention:   activity supervised   assistive device/personal items within reach   safety round/check completed  Taken 6/26/2024 2015 by Sheila Fitch RN  Safety Promotion/Fall Prevention:   activity supervised   assistive device/personal items within reach   safety round/check completed     Problem: Adult Inpatient Plan of Care  Goal: Absence of Hospital-Acquired Illness or Injury  Intervention: Prevent Skin Injury  Recent Flowsheet Documentation  Taken 6/27/2024 0415 by Sheila Fitch RN  Body Position: position changed independently  Taken 6/27/2024 0215 by Sheila Fitch RN  Body Position: position changed independently  Taken 6/27/2024 0015 by Sheila Fitch RN  Body Position: position changed independently  Taken 6/26/2024 2215 by Sheila Fitch RN  Body Position: position changed independently  Taken 6/26/2024 2015 by Sheila Fitch RN  Body Position: position changed independently     Problem: Adult Inpatient Plan of Care  Goal: Absence of Hospital-Acquired Illness or Injury  Intervention: Prevent and Manage  VTE (Venous Thromboembolism) Risk  Recent Flowsheet Documentation  Taken 6/27/2024 0415 by Sheila Fitch, RN  Activity Management: activity encouraged  Taken 6/27/2024 0215 by Sheila Fitch, RN  Activity Management: activity encouraged  Taken 6/27/2024 0015 by Sheila Fitch, RN  Activity Management: activity encouraged  Taken 6/26/2024 2215 by Sheila Fitch, RN  Activity Management: activity encouraged  Taken 6/26/2024 2015 by Sheila Fitch, RN  Activity Management: activity encouraged   Goal Outcome Evaluation:           Progress: no change

## 2024-06-27 NOTE — CASE MANAGEMENT/SOCIAL WORK
Case Management Discharge Note      Final Note: Ms. Farias is being discharged home today, 6/27/24. A referral was called to [facility] for skilled nursing and was accepted. No further discharge needs noted. Family will transport her home.         Selected Continued Care - Admitted Since 6/25/2024       Destination    No services have been selected for the patient.                Durable Medical Equipment    No services have been selected for the patient.                Dialysis/Infusion    No services have been selected for the patient.                Home Medical Care Coordination complete.      Service Provider Selected Services Address Phone Fax Patient Preferred    [facility] [address] [place] [address] [phone] --              Therapy    No services have been selected for the patient.                Community Resources    No services have been selected for the patient.                Community & DME    No services have been selected for the patient.                         Final Discharge Disposition Code: 06 - home with home health care

## 2024-06-27 NOTE — OUTREACH NOTE
Prep Survey      Flowsheet Row Responses   [facility] facility patient discharged from? [place]   Is LACE score < 7 ? Yes   Eligibility [facility]   Date of Admission 06/25/24   Date of Discharge 06/27/24   Discharge Disposition Home or Self Care   Discharge diagnosis Hypertensive urgency   Does the patient have one of the following disease processes/diagnoses(primary or secondary)? Other   Does the patient have Home health ordered? Yes   What is the Home health agency?  [facility]   Is there a DME ordered? No   Prep survey completed? Yes            BERE HOGAN - Registered Nurse

## 2024-06-28 ENCOUNTER — TRANSITIONAL CARE MANAGEMENT TELEPHONE ENCOUNTER (OUTPATIENT)
Dept: CALL CENTER | Facility: HOSPITAL | Age: 69
End: 2024-06-28
Payer: MEDICARE

## 2024-06-28 ENCOUNTER — HOME CARE VISIT (OUTPATIENT)
Dept: HOME HEALTH SERVICES | Facility: HOME HEALTHCARE | Age: 69
End: 2024-06-28
Payer: COMMERCIAL

## 2024-06-28 VITALS
HEART RATE: 76 BPM | OXYGEN SATURATION: 97 % | RESPIRATION RATE: 16 BRPM | TEMPERATURE: 97.4 F | SYSTOLIC BLOOD PRESSURE: 102 MMHG | DIASTOLIC BLOOD PRESSURE: 70 MMHG

## 2024-06-28 PROCEDURE — G0299 HHS/HOSPICE OF RN EA 15 MIN: HCPCS

## 2024-06-28 NOTE — OUTREACH NOTE
Call Center TCM Note      Flowsheet Row Responses   [facility] patient discharged from? [place]   Does the patient have one of the following disease processes/diagnoses(primary or secondary)? Other   TCM attempt successful? No   Unsuccessful attempts Attempt 2            Lolita Comer LPN    6/28/2024, 13:25 EDT

## 2024-06-28 NOTE — Clinical Note
"SOC Note: Patient admitted to [facility] on 6.28.24.     Home Health ordered for: disciplines SN only    Reason for Hosp/Primary Dx/Co-morbidities: 69-year-old female w/ PMH of HTN and HLD.  Presented to [facility] ED from PCP's office w/ hypertension.  /110 at PCP's office and 224/141 in the ED.  Treated for hypertensive urgency.  BP medications changed and BP better controlled prior to discharge.     Focus of Care: Education r/t hypertensive urgency, medication education, fall prevention, cardiopulmonary assessments    Patient's goal(s):  \"I hope that I can get my BP controlled.\"    Current Functional status/mobility/DME: Minimal assist.  No use of DME.     HB status/Living Arrangements: Homebound.  Lives in [facility] with her grandson.     Skin Integrity/wound status: CDI    Code Status: Full-code    Fall Risk/Safety concerns: Fall risk    Educated on Emergency Plan, steps to take prior to going to the ER and when to Call Home Health First:  Yes     Medication issues/Concerns: N/A    Additional Problems/Concerns: N/A    SDOH Barriers (i.e. caregiver concerns, social isolation, transportation, food insecurity, environment, income etc.)/Need for MSW: N/A"

## 2024-06-28 NOTE — HOME HEALTH
"SOC Note: Patient admitted to [facility] on 6.28.24.     Home Health ordered for: disciplines SN only    Reason for Hosp/Primary Dx/Co-morbidities: 69-year-old female w/ PMH of HTN and HLD.  Presented to [facility] ED from PCP's office w/ hypertension.  /110 at PCP's office and 224/141 in the ED.  Treated for hypertensive urgency.  BP medications changed and BP better controlled prior to discharge.     Focus of Care: Education r/t hypertensive urgency, medication education, fall prevention, cardiopulmonary assessments    Patient's goal(s):  \"I hope that I can get my BP controlled.\"    Current Functional status/mobility/DME: Minimal assist.  No use of DME.     HB status/Living Arrangements: Homebound.  [place] [facility] [place].     Skin Integrity/wound status: CDI    Code Status: Full-code    Fall Risk/Safety concerns: Fall risk    Educated on Emergency Plan, steps to take prior to going to the ER and when to Call Home Health First:  Yes     Medication issues/Concerns: N/A    Additional Problems/Concerns: N/A    SDOH Barriers (i.e. caregiver concerns, social isolation, transportation, food insecurity, environment, income etc.)/Need for MSW: N/A"

## 2024-06-28 NOTE — OUTREACH NOTE
Call Center TCM Note      Flowsheet Row Responses   [facility] patient discharged from? [place]   Does the patient have one of the following disease processes/diagnoses(primary or secondary)? Other   TCM attempt successful? No   Unsuccessful attempts Attempt 1            Lolita Comer LPN    6/28/2024, 08:56 EDT

## 2024-06-29 ENCOUNTER — TRANSITIONAL CARE MANAGEMENT TELEPHONE ENCOUNTER (OUTPATIENT)
Dept: CALL CENTER | Facility: HOSPITAL | Age: 69
End: 2024-06-29
Payer: MEDICARE

## 2024-06-29 NOTE — OUTREACH NOTE
Call Center TCM Note      Flowsheet Row Responses   [facility] patient discharged from? [place]   Does the patient have one of the following disease processes/diagnoses(primary or secondary)? Other   TCM attempt successful? No   Unsuccessful attempts Attempt 3  [Gurpreet on verbal release-no answer]            Carito Wilson RN    6/29/2024, 11:14 EDT

## 2024-07-01 ENCOUNTER — HOME CARE VISIT (OUTPATIENT)
Dept: HOME HEALTH SERVICES | Facility: HOME HEALTHCARE | Age: 69
End: 2024-07-01
Payer: COMMERCIAL

## 2024-07-01 VITALS
HEART RATE: 71 BPM | RESPIRATION RATE: 18 BRPM | OXYGEN SATURATION: 98 % | SYSTOLIC BLOOD PRESSURE: 142 MMHG | TEMPERATURE: 97.2 F | DIASTOLIC BLOOD PRESSURE: 78 MMHG

## 2024-07-01 PROCEDURE — G0299 HHS/HOSPICE OF RN EA 15 MIN: HCPCS

## 2024-07-02 ENCOUNTER — HOSPITAL ENCOUNTER (OUTPATIENT)
Dept: MAMMOGRAPHY | Facility: HOSPITAL | Age: 69
Discharge: HOME | End: 2024-07-02
Payer: MEDICARE

## 2024-07-02 DIAGNOSIS — R92.8 ABNORMAL MAMMOGRAM: ICD-10-CM

## 2024-07-02 PROCEDURE — 88305 TISSUE EXAM BY PATHOLOGIST: CPT

## 2024-07-02 PROCEDURE — 25010000002 LIDOCAINE 1 % SOLUTION: Performed by: RADIOLOGY

## 2024-07-02 PROCEDURE — A4648 IMPLANTABLE TISSUE MARKER: HCPCS

## 2024-07-02 PROCEDURE — C1819 TISSUE LOCALIZATION-EXCISION: HCPCS

## 2024-07-02 RX ORDER — LIDOCAINE HYDROCHLORIDE AND EPINEPHRINE 10; 10 MG/ML; UG/ML
10 INJECTION, SOLUTION INFILTRATION; PERINEURAL ONCE
Status: COMPLETED | OUTPATIENT
Start: 2024-07-02 | End: 2024-07-02

## 2024-07-02 RX ORDER — LIDOCAINE HYDROCHLORIDE 10 MG/ML
5 INJECTION, SOLUTION INFILTRATION; PERINEURAL ONCE
Status: COMPLETED | OUTPATIENT
Start: 2024-07-02 | End: 2024-07-02

## 2024-07-02 RX ADMIN — Medication 5 ML: at 10:24

## 2024-07-02 RX ADMIN — LIDOCAINE HYDROCHLORIDE,EPINEPHRINE BITARTRATE 10 ML: 10; .01 INJECTION, SOLUTION INFILTRATION; PERINEURAL at 10:24

## 2024-07-02 NOTE — PROGRESS NOTES
Alert and oriented. Denies discomfort, no active bleeding, steri-strips not visualized, gauze dressing intact. Cold packs given. Questions answered, support given. Verbalizes and demonstrates understanding of post-care instructions, written copy given.

## 2024-07-03 ENCOUNTER — OFFICE VISIT (OUTPATIENT)
Dept: FAMILY MEDICINE CLINIC | Facility: CLINIC | Age: 69
End: 2024-07-03
Payer: MEDICARE

## 2024-07-03 VITALS
BODY MASS INDEX: 31.33 KG/M2 | SYSTOLIC BLOOD PRESSURE: 128 MMHG | HEIGHT: 60 IN | OXYGEN SATURATION: 99 % | DIASTOLIC BLOOD PRESSURE: 82 MMHG | WEIGHT: 159.6 LBS | HEART RATE: 72 BPM

## 2024-07-03 DIAGNOSIS — I10 PRIMARY HYPERTENSION: Primary | ICD-10-CM

## 2024-07-03 DIAGNOSIS — G47.8 POOR SLEEP PATTERN: ICD-10-CM

## 2024-07-03 DIAGNOSIS — F32.A DEPRESSION, UNSPECIFIED DEPRESSION TYPE: ICD-10-CM

## 2024-07-03 LAB
CYTO UR: NORMAL
LAB AP CASE REPORT: NORMAL
LAB AP CLINICAL INFORMATION: NORMAL
LAB AP DIAGNOSIS COMMENT: NORMAL
PATH REPORT.FINAL DX SPEC: NORMAL
PATH REPORT.GROSS SPEC: NORMAL

## 2024-07-03 NOTE — ASSESSMENT & PLAN NOTE
Hypertension is stable and controlled  Continue current treatment regimen.  Dietary sodium restriction.  Ambulatory blood pressure monitoring.  Blood pressure will be reassessed  2 months .  Patient to notify me of BP readings consistently greater than 140/90.
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
## 2024-07-03 NOTE — ASSESSMENT & PLAN NOTE
Believe patient's poor sleep pattern could be related to her depression.  Discussed importance of sleep hygiene.  Can try melatonin 5 mg at nighttime.  Believe talk therapy may benefit her as well.  I placed a referral for this.  Will start her here and reevaluate in 2 months.  If at that time she is having new or worsening symptoms we can consider medication for depression. Patient verbalized understanding and agreed to plan.

## 2024-07-05 ENCOUNTER — HOME CARE VISIT (OUTPATIENT)
Dept: HOME HEALTH SERVICES | Facility: HOME HEALTHCARE | Age: 69
End: 2024-07-05
Payer: COMMERCIAL

## 2024-07-05 ENCOUNTER — TELEPHONE (OUTPATIENT)
Dept: FAMILY MEDICINE CLINIC | Facility: CLINIC | Age: 69
End: 2024-07-05
Payer: MEDICARE

## 2024-07-05 ENCOUNTER — TELEPHONE (OUTPATIENT)
Facility: HOSPITAL | Age: 69
End: 2024-07-05
Payer: MEDICARE

## 2024-07-05 VITALS
SYSTOLIC BLOOD PRESSURE: 138 MMHG | HEART RATE: 78 BPM | RESPIRATION RATE: 20 BRPM | OXYGEN SATURATION: 99 % | TEMPERATURE: 98.8 F | DIASTOLIC BLOOD PRESSURE: 72 MMHG

## 2024-07-05 PROCEDURE — G0299 HHS/HOSPICE OF RN EA 15 MIN: HCPCS

## 2024-07-05 NOTE — TELEPHONE ENCOUNTER
----- Message from Fiona Fitch sent at 7/5/2024  1:03 PM EDT -----    Biopsy does not show any signs of atypia or malignancy.  Shows some calcifications and chronic inflammation.  Please let me know if you have any new or worsening symptoms.    Unable to reach patient.     Please relay message.

## 2024-07-05 NOTE — TELEPHONE ENCOUNTER
Name: RADHA LUQUE      Relationship: SELF      Best Callback Number: [phone]      HUB PROVIDED THE RELAY MESSAGE FROM THE OFFICE      PATIENT: VOICED UNDERSTANDING AND HAS NO FURTHER QUESTIONS AT THIS TIME

## 2024-07-08 ENCOUNTER — TELEPHONE (OUTPATIENT)
Dept: MAMMOGRAPHY | Facility: HOSPITAL | Age: 69
End: 2024-07-08
Payer: MEDICARE

## 2024-07-09 ENCOUNTER — TRANSCRIBE ORDERS (OUTPATIENT)
Dept: ADMINISTRATIVE | Facility: HOSPITAL | Age: 69
End: 2024-07-09
Payer: MEDICARE

## 2024-07-09 ENCOUNTER — TELEPHONE (OUTPATIENT)
Dept: MAMMOGRAPHY | Facility: HOSPITAL | Age: 69
End: 2024-07-09
Payer: MEDICARE

## 2024-07-09 DIAGNOSIS — R92.8 ABNORMAL MAMMOGRAM: Primary | ICD-10-CM

## 2024-07-09 DIAGNOSIS — I10 HYPERTENSION, UNSPECIFIED TYPE: ICD-10-CM

## 2024-07-09 RX ORDER — LISINOPRIL 10 MG/1
30 TABLET ORAL DAILY
Qty: 270 TABLET | OUTPATIENT
Start: 2024-07-09

## 2024-07-10 ENCOUNTER — HOME CARE VISIT (OUTPATIENT)
Dept: HOME HEALTH SERVICES | Facility: HOME HEALTHCARE | Age: 69
End: 2024-07-10
Payer: COMMERCIAL

## 2024-07-10 VITALS
OXYGEN SATURATION: 97 % | RESPIRATION RATE: 18 BRPM | SYSTOLIC BLOOD PRESSURE: 156 MMHG | DIASTOLIC BLOOD PRESSURE: 80 MMHG | HEART RATE: 87 BPM | TEMPERATURE: 97.4 F

## 2024-07-10 PROCEDURE — G0299 HHS/HOSPICE OF RN EA 15 MIN: HCPCS

## 2024-07-11 DIAGNOSIS — I10 HYPERTENSION, UNSPECIFIED TYPE: ICD-10-CM

## 2024-07-11 RX ORDER — LISINOPRIL 10 MG/1
30 TABLET ORAL DAILY
Qty: 270 TABLET | OUTPATIENT
Start: 2024-07-11

## 2024-07-12 ENCOUNTER — HOME CARE VISIT (OUTPATIENT)
Dept: HOME HEALTH SERVICES | Facility: HOME HEALTHCARE | Age: 69
End: 2024-07-12
Payer: MEDICARE

## 2024-07-12 ENCOUNTER — TELEPHONE (OUTPATIENT)
Dept: FAMILY MEDICINE CLINIC | Facility: CLINIC | Age: 69
End: 2024-07-12
Payer: MEDICARE

## 2024-07-12 VITALS
HEART RATE: 69 BPM | SYSTOLIC BLOOD PRESSURE: 150 MMHG | TEMPERATURE: 97.3 F | DIASTOLIC BLOOD PRESSURE: 93 MMHG | OXYGEN SATURATION: 99 % | RESPIRATION RATE: 18 BRPM

## 2024-07-12 DIAGNOSIS — I10 HYPERTENSION, UNSPECIFIED TYPE: ICD-10-CM

## 2024-07-12 PROCEDURE — G0299 HHS/HOSPICE OF RN EA 15 MIN: HCPCS

## 2024-07-12 RX ORDER — LISINOPRIL 40 MG/1
40 TABLET ORAL DAILY
Qty: 90 TABLET | Refills: 1 | Status: SHIPPED | OUTPATIENT
Start: 2024-07-12

## 2024-07-12 RX ORDER — LISINOPRIL 10 MG/1
30 TABLET ORAL DAILY
Qty: 270 TABLET | Refills: 0 | OUTPATIENT
Start: 2024-07-12

## 2024-08-14 NOTE — PROGRESS NOTES
Office Note     Name: Falguni Laureano    : 1955     MRN: [id number]     Chief Complaint  Blood Pressure Check (Dry mouth from medications) and Labs Only    Subjective     History of Present Illness:  Falguni Laureano is a 69 y.o. female who presents today for blood pressure and lab recheck.  Patient's current regimen for high blood pressure is HCTZ 25 mg, lisinopril 40 mg and metoprolol 100 mg twice daily.  Patient states this morning she has only taken her HCTZ 25 mg.  She states that the HCTZ does cause her to have dry mouth and notices since she started the medicine about a month ago.  She states that she takes all of the medicines at the same time it can upset her stomach, but she states she does not eat when she does take the medicine.  Patient's blood pressure currently 180/102.  Upon recheck it is 160/100.  Patient denies chest pain, shortness of breath, muscle weakness, numbness or tingling, visual changes or headache.  Patient states when she does take her blood pressure medicines and checks her blood pressure at home it is around 140/80.  She states she has not been checking every day.    Review of Systems:   Review of Systems   Constitutional:  Negative for chills and fever.   Eyes:  Negative for visual disturbance.   Respiratory:  Negative for cough, chest tightness and shortness of breath.    Cardiovascular:  Negative for chest pain, palpitations and leg swelling.   Gastrointestinal:  Negative for abdominal pain.   Neurological:  Negative for dizziness and light-headedness.       Past Medical History:   Past Medical History:   Diagnosis Date    Hypertension        Past Surgical History:   Past Surgical History:   Procedure Laterality Date    HIP FRACTURE SURGERY  2020       Family History:   Family History   Problem Relation Age of Onset    No Known Problems Mother     No Known Problems Father     No Known Problems Daughter     No Known Problems Maternal Grandmother     No Known Problems Paternal  "Grandmother     No Known Problems Maternal Aunt     Breast cancer Paternal Aunt     No Known Problems Maternal Grandfather     No Known Problems Paternal Grandfather     No Known Problems Maternal Uncle     No Known Problems Paternal Uncle     Ovarian cancer Neg Hx        Social History:   Social History     Socioeconomic History    Marital status:    Tobacco Use    Smoking status: Never    Smokeless tobacco: Never   Vaping Use    Vaping status: Never Used   Substance and Sexual Activity    Alcohol use: Not Currently    Drug use: Never    Sexual activity: Defer       Immunizations:   There is no immunization history on file for this patient.     Medications:     Current Outpatient Medications:     acetaminophen (TYLENOL) 500 MG tablet, Take 1 tablet by mouth Every 6 (Six) Hours As Needed for Mild Pain. Indications: Pain, Disp: , Rfl:     atorvastatin (LIPITOR) 10 MG tablet, Take 1 tablet by mouth Every Night., Disp: 90 tablet, Rfl: 3    calcium carbonate (OS-EVELIO) 600 MG tablet, Take 1 tablet by mouth Daily. Indications: Low Amount of Calcium in the Blood, Disp: , Rfl:     hydroCHLOROthiazide 25 MG tablet, Take 1 tablet by mouth Daily., Disp: 30 tablet, Rfl: 5    lisinopril (PRINIVIL,ZESTRIL) 40 MG tablet, Take 1 tablet by mouth Daily., Disp: 90 tablet, Rfl: 1    metoprolol tartrate (LOPRESSOR) 100 MG tablet, Take 1 tablet by mouth Every 12 (Twelve) Hours., Disp: 60 tablet, Rfl: 5    omeprazole (priLOSEC) 40 MG capsule, TAKE 1 CAPSULE BY MOUTH DAILY, Disp: 90 capsule, Rfl: 0    amLODIPine (NORVASC) 5 MG tablet, Take 1 tablet by mouth Daily., Disp: 30 tablet, Rfl: 5    Allergies:   No Known Allergies    Objective     Vital Signs  BP (!) 182/102 Comment: has only too 1 pill so far  Pulse 80   Ht 152.4 cm (60\")   Wt 72.6 kg (160 lb)   SpO2 98%   BMI 31.25 kg/m²   Estimated body mass index is 31.25 kg/m² as calculated from the following:    Height as of this encounter: 152.4 cm (60\").    Weight as of this " encounter: 72.6 kg (160 lb).         Physical Exam  Vitals reviewed.   Constitutional:       General: She is not in acute distress.     Appearance: Normal appearance.   HENT:      Head: Normocephalic and atraumatic.   Eyes:      Pupils: Pupils are equal, round, and reactive to light.   Cardiovascular:      Rate and Rhythm: Normal rate and regular rhythm.      Pulses: Normal pulses.      Heart sounds: Normal heart sounds.   Pulmonary:      Effort: Pulmonary effort is normal.      Breath sounds: Normal breath sounds. No wheezing.   Abdominal:      General: Abdomen is flat. Bowel sounds are normal.   Skin:     General: Skin is warm.   Neurological:      General: No focal deficit present.      Mental Status: She is alert and oriented to person, place, and time.   Psychiatric:         Mood and Affect: Mood normal.          Results:  No results found for this or any previous visit (from the past 24 hour(s)).     Assessment and Plan     Assessment/Plan:  Diagnoses and all orders for this visit:    1. Hypertension, unspecified type (Primary)  Assessment & Plan:  Hypertension uncontrolled today, but she has not taken most of her medicines this morning.  Advised her she needs to go home and take her medications and make sure that her blood pressure comes down.  If it does not come down would like her to return to clinic, urgent care or ER.  With her complaints of dry mouth with HCTZ will switch to amlodipine 5 mg.  Want to see patient back in about 1 week.  Wrote out instructions for patient to take her lisinopril 40 mg, metoprolol and amlodipine in the morning.  Would like her to take her blood pressure 1 hour after taking her medicines.  Advised her to eat with her medications to avoid upset stomach.  Would like her to take her blood pressure in the afternoon.  At nighttime would like her to take second dose of metoprolol and cord blood pressure 1 hour after taking her medicine.  Discussed with the patient the risks of not  taking her blood pressure medication as prescribed including worsening CAD, stroke or vision changes.  If her blood pressure is consistently running greater than 140/90 I would like her to call me and notify me that its consistently running high.  If your blood pressure continuing to run high and he would develop visual changes, numbness or tingling, headache, chest pain go to the ER.  Would like to see patient back in 1 week for reevaluation of her blood pressure with her log.  Discussed the importance with her.  Wrote down all instructions clearly for her to take in show her grandson who helps with her care.  Patient verbalized understanding and agreed to plan.    Orders:  -     CBC (No Diff); Future  -     Comprehensive Metabolic Panel; Future  -     Magnesium; Future  -     amLODIPine (NORVASC) 5 MG tablet; Take 1 tablet by mouth Daily.  Dispense: 30 tablet; Refill: 5  -     Lipid Panel; Future    Plan to check cholesterol panel next visit.    Follow Up  Return in about 1 week (around 8/22/2024).    Fiona Fitch PA-C   [facility]

## 2024-08-15 ENCOUNTER — OFFICE VISIT (OUTPATIENT)
Dept: FAMILY MEDICINE CLINIC | Facility: CLINIC | Age: 69
End: 2024-08-15
Payer: MEDICARE

## 2024-08-15 VITALS
DIASTOLIC BLOOD PRESSURE: 102 MMHG | HEIGHT: 60 IN | HEART RATE: 80 BPM | WEIGHT: 160 LBS | SYSTOLIC BLOOD PRESSURE: 182 MMHG | BODY MASS INDEX: 31.41 KG/M2 | OXYGEN SATURATION: 98 %

## 2024-08-15 DIAGNOSIS — I10 HYPERTENSION, UNSPECIFIED TYPE: Primary | ICD-10-CM

## 2024-08-15 PROCEDURE — 1160F RVW MEDS BY RX/DR IN RCRD: CPT

## 2024-08-15 PROCEDURE — 99214 OFFICE O/P EST MOD 30 MIN: CPT

## 2024-08-15 PROCEDURE — 1159F MED LIST DOCD IN RCRD: CPT

## 2024-08-15 PROCEDURE — 3080F DIAST BP >= 90 MM HG: CPT

## 2024-08-15 PROCEDURE — 1126F AMNT PAIN NOTED NONE PRSNT: CPT

## 2024-08-15 PROCEDURE — 3077F SYST BP >= 140 MM HG: CPT

## 2024-08-15 RX ORDER — AMLODIPINE BESYLATE 5 MG/1
5 TABLET ORAL DAILY
Qty: 30 TABLET | Refills: 5 | Status: SHIPPED | OUTPATIENT
Start: 2024-08-15

## 2024-08-15 NOTE — ASSESSMENT & PLAN NOTE
Hypertension uncontrolled today, but she has not taken most of her medicines this morning.  Advised her she needs to go home and take her medications and make sure that her blood pressure comes down.  If it does not come down would like her to return to clinic, urgent care or ER.  With her complaints of dry mouth with HCTZ will switch to amlodipine 5 mg.  Want to see patient back in about 1 week.  Wrote out instructions for patient to take her lisinopril 40 mg, metoprolol and amlodipine in the morning.  Would like her to take her blood pressure 1 hour after taking her medicines.  Advised her to eat with her medications to avoid upset stomach.  Would like her to take her blood pressure in the afternoon.  At nighttime would like her to take second dose of metoprolol and cord blood pressure 1 hour after taking her medicine.  Discussed with the patient the risks of not taking her blood pressure medication as prescribed including worsening CAD, stroke or vision changes.  If her blood pressure is consistently running greater than 140/90 I would like her to call me and notify me that its consistently running high.  If your blood pressure continuing to run high and he would develop visual changes, numbness or tingling, headache, chest pain go to the ER.  Would like to see patient back in 1 week for reevaluation of her blood pressure with her log.  Discussed the importance with her.  Wrote down all instructions clearly for her to take in show her grandson who helps with her care.  Patient verbalized understanding and agreed to plan.

## 2024-08-21 ENCOUNTER — OFFICE VISIT (OUTPATIENT)
Age: 69
End: 2024-08-21
Payer: MEDICARE

## 2024-08-21 DIAGNOSIS — F32.9 REACTIVE DEPRESSION: Primary | ICD-10-CM

## 2024-08-21 RX ORDER — METOPROLOL TARTRATE 100 MG/1
100 TABLET ORAL DAILY
Qty: 90 TABLET | Refills: 0 | Status: SHIPPED | OUTPATIENT
Start: 2024-08-21 | End: 2024-08-23 | Stop reason: SDUPTHER

## 2024-08-21 NOTE — TELEPHONE ENCOUNTER
Rx Refill Note  Requested Prescriptions     Pending Prescriptions Disp Refills    metoprolol tartrate (LOPRESSOR) 100 MG tablet [Pharmacy Med Name: METOPROLOL TARTRATE 100MG TABLETS] 90 tablet      Sig: TAKE 1 TABLET BY MOUTH DAILY      Last office visit with prescribing clinician: 8/15/2024   Last telemedicine visit with prescribing clinician: Visit date not found   Next office visit with prescribing clinician: 8/22/2024                         Would you like a call back once the refill request has been completed: [] Yes [] No    If the office needs to give you a call back, can they leave a voicemail: [] Yes [] No    Suyapa Gasca MA  08/21/24, 13:44 EDT
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
## 2024-08-22 ENCOUNTER — LAB (OUTPATIENT)
Dept: LAB | Facility: HOSPITAL | Age: 69
End: 2024-08-22
Payer: MEDICARE

## 2024-08-22 ENCOUNTER — OFFICE VISIT (OUTPATIENT)
Dept: FAMILY MEDICINE CLINIC | Facility: CLINIC | Age: 69
End: 2024-08-22
Payer: MEDICARE

## 2024-08-22 ENCOUNTER — TELEPHONE (OUTPATIENT)
Dept: CARDIOLOGY | Facility: CLINIC | Age: 69
End: 2024-08-22

## 2024-08-22 VITALS
OXYGEN SATURATION: 100 % | BODY MASS INDEX: 32.12 KG/M2 | SYSTOLIC BLOOD PRESSURE: 150 MMHG | DIASTOLIC BLOOD PRESSURE: 92 MMHG | HEART RATE: 74 BPM | WEIGHT: 163.6 LBS | HEIGHT: 60 IN

## 2024-08-22 DIAGNOSIS — I10 PRIMARY HYPERTENSION: Primary | ICD-10-CM

## 2024-08-22 DIAGNOSIS — M19.90 ARTHRITIS: ICD-10-CM

## 2024-08-22 DIAGNOSIS — I10 HYPERTENSION, UNSPECIFIED TYPE: ICD-10-CM

## 2024-08-22 LAB
ALBUMIN SERPL-MCNC: 4 G/DL (ref 3.5–5.2)
ALBUMIN/GLOB SERPL: 1.3 G/DL
ALP SERPL-CCNC: 89 U/L (ref 39–117)
ALT SERPL W P-5'-P-CCNC: 20 U/L (ref 1–33)
ANION GAP SERPL CALCULATED.3IONS-SCNC: 9.2 MMOL/L (ref 5–15)
AST SERPL-CCNC: 25 U/L (ref 1–32)
BILIRUB SERPL-MCNC: 0.5 MG/DL (ref 0–1.2)
BUN SERPL-MCNC: 12 MG/DL (ref 8–23)
BUN/CREAT SERPL: 12.2 (ref 7–25)
CALCIUM SPEC-SCNC: 9.9 MG/DL (ref 8.6–10.5)
CHLORIDE SERPL-SCNC: 101 MMOL/L (ref 98–107)
CHOLEST SERPL-MCNC: 113 MG/DL (ref 0–200)
CO2 SERPL-SCNC: 24.8 MMOL/L (ref 22–29)
CREAT SERPL-MCNC: 0.98 MG/DL (ref 0.57–1)
DEPRECATED RDW RBC AUTO: 39.6 FL (ref 37–54)
EGFRCR SERPLBLD CKD-EPI 2021: 62.2 ML/MIN/1.73
ERYTHROCYTE [DISTWIDTH] IN BLOOD BY AUTOMATED COUNT: 13 % (ref 12.3–15.4)
GLOBULIN UR ELPH-MCNC: 3.2 GM/DL
GLUCOSE SERPL-MCNC: 92 MG/DL (ref 65–99)
HCT VFR BLD AUTO: 38.7 % (ref 34–46.6)
HDLC SERPL-MCNC: 28 MG/DL (ref 40–60)
HGB BLD-MCNC: 12.6 G/DL (ref 12–15.9)
LDLC SERPL CALC-MCNC: 53 MG/DL (ref 0–100)
LDLC/HDLC SERPL: 1.65 {RATIO}
MAGNESIUM SERPL-MCNC: 1.9 MG/DL (ref 1.6–2.4)
MCH RBC QN AUTO: 27.4 PG (ref 26.6–33)
MCHC RBC AUTO-ENTMCNC: 32.6 G/DL (ref 31.5–35.7)
MCV RBC AUTO: 84.1 FL (ref 79–97)
PLATELET # BLD AUTO: 207 10*3/MM3 (ref 140–450)
PMV BLD AUTO: 10 FL (ref 6–12)
POTASSIUM SERPL-SCNC: 4.3 MMOL/L (ref 3.5–5.2)
PROT SERPL-MCNC: 7.2 G/DL (ref 6–8.5)
RBC # BLD AUTO: 4.6 10*6/MM3 (ref 3.77–5.28)
SODIUM SERPL-SCNC: 135 MMOL/L (ref 136–145)
TRIGL SERPL-MCNC: 194 MG/DL (ref 0–150)
VLDLC SERPL-MCNC: 32 MG/DL (ref 5–40)
WBC NRBC COR # BLD AUTO: 4.87 10*3/MM3 (ref 3.4–10.8)

## 2024-08-22 PROCEDURE — 1126F AMNT PAIN NOTED NONE PRSNT: CPT

## 2024-08-22 PROCEDURE — 1160F RVW MEDS BY RX/DR IN RCRD: CPT

## 2024-08-22 PROCEDURE — 80053 COMPREHEN METABOLIC PANEL: CPT

## 2024-08-22 PROCEDURE — 3077F SYST BP >= 140 MM HG: CPT

## 2024-08-22 PROCEDURE — 80061 LIPID PANEL: CPT

## 2024-08-22 PROCEDURE — 3080F DIAST BP >= 90 MM HG: CPT

## 2024-08-22 PROCEDURE — 85027 COMPLETE CBC AUTOMATED: CPT

## 2024-08-22 PROCEDURE — 1159F MED LIST DOCD IN RCRD: CPT

## 2024-08-22 PROCEDURE — 99214 OFFICE O/P EST MOD 30 MIN: CPT

## 2024-08-22 PROCEDURE — 83735 ASSAY OF MAGNESIUM: CPT

## 2024-08-22 NOTE — ASSESSMENT & PLAN NOTE
Hypertension is uncontrolled  Dietary sodium restriction.  Ambulatory blood pressure monitoring.  Referral to cardiology  Increase amlodipine to 10 mg daily.  Continue to take lisinopril 40 mg and metoprolol 100 mg.  Patient to notify me if blood pressure readings consistently greater than 140/90.  Will refer to cardiology at this time for further evaluation of hypertension.  Blood pressure will be reassessedin 3 months.

## 2024-08-22 NOTE — PROGRESS NOTES
Office Note     Name: Falguni Laureano    : 1955     MRN: [id number]     Chief Complaint  Hypertension (1 week f/u/Would like to discuss joint medication (off and on pain))    Subjective     History of Present Illness:  Falguni Laureano is a 69 y.o. female who presents today for blood pressure recheck.  Past medical history includes depression, dyslipidemia, GERD, hypertension.  Patient currently taking amlodipine, HCTZ and lisinopril.  At last visit about 1 week ago we had switched patient to amlodipine from HCTZ as she was having dry mouth with the HCTZ.  Started Amlodipine 5 mg on Friday.    Blood pressure is 164/94 upon initial intake and 150/92 upon second intake.  Patient states that dry mouth resolved with stopping the amlodipine.  Denies any issues with taking the amlodipine.  Denies lower extremity swelling.  No chest pain, SOA, headache or visual changes.    Patient also presents with complaints of joint pain.  Patient states she has had this pain for a long time.  She states she has tried using diclofenac gel over-the-counter, but it has not helped.  Patient has not taken any oral medications for this before, but would like to discuss this today.  Denies fevers or chills.  States this in her hips and her knees.  States that it is worse when she stands for prolonged periods of time.  Denies numbness or tingling.  Also hurts for sitting for prolonged periods of time.  Denies swelling or redness or warmth.  Denies any recent injuries.        Review of Systems:   Review of Systems   Constitutional:  Negative for chills and fever.   Respiratory:  Negative for chest tightness and shortness of breath.    Cardiovascular:  Negative for chest pain, palpitations and leg swelling.   Gastrointestinal:  Negative for abdominal pain.   Musculoskeletal:  Positive for arthralgias. Negative for back pain, gait problem and joint swelling.   Neurological:  Negative for dizziness, light-headedness and numbness.        Past Medical History:   Past Medical History:   Diagnosis Date    Hypertension        Past Surgical History:   Past Surgical History:   Procedure Laterality Date    HIP FRACTURE SURGERY  2020       Family History:   Family History   Problem Relation Age of Onset    No Known Problems Mother     No Known Problems Father     No Known Problems Daughter     No Known Problems Maternal Grandmother     No Known Problems Paternal Grandmother     No Known Problems Maternal Aunt     Breast cancer Paternal Aunt     No Known Problems Maternal Grandfather     No Known Problems Paternal Grandfather     No Known Problems Maternal Uncle     No Known Problems Paternal Uncle     Ovarian cancer Neg Hx        Social History:   Social History     Socioeconomic History    Marital status:    Tobacco Use    Smoking status: Never    Smokeless tobacco: Never   Vaping Use    Vaping status: Never Used   Substance and Sexual Activity    Alcohol use: Not Currently    Drug use: Never    Sexual activity: Defer       Immunizations:   There is no immunization history on file for this patient.     Medications:     Current Outpatient Medications:     acetaminophen (TYLENOL) 500 MG tablet, Take 1 tablet by mouth Every 6 (Six) Hours As Needed for Mild Pain. Indications: Pain, Disp: , Rfl:     amLODIPine (NORVASC) 5 MG tablet, Take 1 tablet by mouth Daily., Disp: 30 tablet, Rfl: 5    atorvastatin (LIPITOR) 10 MG tablet, Take 1 tablet by mouth Every Night., Disp: 90 tablet, Rfl: 3    calcium carbonate (OS-EVELIO) 600 MG tablet, Take 1 tablet by mouth Daily. Indications: Low Amount of Calcium in the Blood, Disp: , Rfl:     lisinopril (PRINIVIL,ZESTRIL) 40 MG tablet, Take 1 tablet by mouth Daily., Disp: 90 tablet, Rfl: 1    metoprolol tartrate (LOPRESSOR) 100 MG tablet, TAKE 1 TABLET BY MOUTH DAILY, Disp: 90 tablet, Rfl: 0    omeprazole (priLOSEC) 40 MG capsule, TAKE 1 CAPSULE BY MOUTH DAILY, Disp: 90 capsule, Rfl: 0    Allergies:   No Known  "Allergies    Objective     Vital Signs  /92   Pulse 74   Ht 152.4 cm (60\")   Wt 74.2 kg (163 lb 9.6 oz)   SpO2 100%   BMI 31.95 kg/m²   Estimated body mass index is 31.95 kg/m² as calculated from the following:    Height as of this encounter: 152.4 cm (60\").    Weight as of this encounter: 74.2 kg (163 lb 9.6 oz).           Physical Exam  Vitals reviewed.   Constitutional:       General: She is not in acute distress.     Appearance: Normal appearance.   HENT:      Head: Normocephalic and atraumatic.   Eyes:      Pupils: Pupils are equal, round, and reactive to light.   Cardiovascular:      Rate and Rhythm: Normal rate and regular rhythm.      Pulses: Normal pulses.      Heart sounds: Normal heart sounds.   Pulmonary:      Effort: Pulmonary effort is normal.      Breath sounds: Normal breath sounds.   Abdominal:      General: Abdomen is flat. Bowel sounds are normal.   Musculoskeletal:         General: No swelling. Normal range of motion.   Skin:     General: Skin is warm.   Neurological:      General: No focal deficit present.      Mental Status: She is alert and oriented to person, place, and time.   Psychiatric:         Mood and Affect: Mood normal.          Results:  No results found for this or any previous visit (from the past 24 hour(s)).     Assessment and Plan     Assessment/Plan:  Diagnoses and all orders for this visit:    1. Primary hypertension (Primary)  Assessment & Plan:  Hypertension is uncontrolled  Dietary sodium restriction.  Ambulatory blood pressure monitoring.  Referral to cardiology  Increase amlodipine to 10 mg daily.  Continue to take lisinopril 40 mg and metoprolol 100 mg.  Patient to notify me if blood pressure readings consistently greater than 140/90.  Will refer to cardiology at this time for further evaluation of hypertension.  Blood pressure will be reassessedin 3 months.    Orders:  -     Ambulatory Referral to Cardiology    2. Arthritis  Assessment & Plan:  Discussed with " patient that arthritis is very common as well and can occur in multiple joints symmetrically.  Often worse with movement.  Advised patient to avoid NSAIDs like ibuprofen.  Recommend trying Tylenol when she is having pain as it is not daily.  Encouraged to stay active.  If pain worsens or there is lack of improvement patient to return to clinic for reevaluation.          Follow Up  Return in about 3 months (around 11/22/2024).    Fiona Fitch PA-C   [facility]

## 2024-08-22 NOTE — ASSESSMENT & PLAN NOTE
Discussed with patient that arthritis is very common as well and can occur in multiple joints symmetrically.  Often worse with movement.  Advised patient to avoid NSAIDs like ibuprofen.  Recommend trying Tylenol when she is having pain as it is not daily.  Encouraged to stay active.  If pain worsens or there is lack of improvement patient to return to clinic for reevaluation.

## 2024-08-23 ENCOUNTER — TELEPHONE (OUTPATIENT)
Dept: FAMILY MEDICINE CLINIC | Facility: CLINIC | Age: 69
End: 2024-08-23
Payer: MEDICARE

## 2024-08-23 DIAGNOSIS — I10 PRIMARY HYPERTENSION: Primary | ICD-10-CM

## 2024-08-23 DIAGNOSIS — I10 PRIMARY HYPERTENSION: ICD-10-CM

## 2024-08-23 RX ORDER — METOPROLOL TARTRATE 100 MG/1
100 TABLET ORAL 2 TIMES DAILY
Qty: 180 TABLET | Refills: 0 | Status: SHIPPED | OUTPATIENT
Start: 2024-08-23

## 2024-08-23 RX ORDER — METOPROLOL TARTRATE 100 MG/1
100 TABLET ORAL 2 TIMES DAILY
Qty: 90 TABLET | Refills: 0 | Status: SHIPPED | OUTPATIENT
Start: 2024-08-23 | End: 2024-08-23

## 2024-08-23 NOTE — TELEPHONE ENCOUNTER
----- Message from Fiona Fitch sent at 8/23/2024  9:16 AM EDT -----    Cholesterol is improving.   Kidney function also improving, likely with better blood pressure control. Will continue to monitor. Avoid NSAIDs like ibuprofen. Continue to improve water intake.     Unable to reach patient.    Please relay.

## 2024-08-23 NOTE — TELEPHONE ENCOUNTER
Caller: Falguni Laureano    Relationship: Self    Best call back number: [phone]    What medication are you requesting: metoprolol tartrate (LOPRESSOR) 100 MG tablet     If a prescription is needed, what is your preferred pharmacy and phone number: [id number] - [place], [place] - [address] AT [address] - [phone]  - [phone] FX     Additional notes: PT STATES SHE IS ON 2 PILLS A DAY AND NOT JUST ONE SINCE SHE WAS IN THE HOSPITAL LAST MONTH. NEEDS NEW RX CALLED IN FOR 2 PILLS A DAY

## 2024-08-26 NOTE — PROGRESS NOTES
[facility] Cardiology    Encounter Date: 2024    Patient ID: Falguni Laureano is a 69 y.o. female.  : 1955     PCP: Fiona Fitch PA-C       Chief Complaint: Hypertension      PROBLEM LIST:  Hypertension  Dyslipidemia   Abnormal EKG  GERD  Status post left hip replacement      History of Present Illness: Falguni Laureano is a 69 y.o. female who presents to the cardiology office today to be seen in consultation for hypertension.  The patient states that she has had high blood pressure for about 20 years and her PCP recently felt that this has been difficult to control.  Additionally she was noted to have an abnormal EKG and is referred to us for further evaluation.  The patient is a , lives alone and is very active physically.  States that she goes for walks and also maintains her household.  Has no current complaints of chest pain shortness of breath edema palpitations dizziness lightheadedness or syncope.  She is quite careful about her diet and checks her blood pressure at home states that it typically runs in 120s and sometimes in 130s.  She has had a recent ER visit due to hypertensive urgency.    Past Medical History:   Past Medical History:   Diagnosis Date    Hypertension        Past Surgical History:   Past Surgical History:   Procedure Laterality Date    HIP FRACTURE SURGERY         Family History:   Family History   Problem Relation Age of Onset    No Known Problems Mother     No Known Problems Father     No Known Problems Daughter     No Known Problems Maternal Grandmother     No Known Problems Paternal Grandmother     No Known Problems Maternal Aunt     Breast cancer Paternal Aunt     No Known Problems Maternal Grandfather     No Known Problems Paternal Grandfather     No Known Problems Maternal Uncle     No Known Problems Paternal Uncle     Ovarian cancer Neg Hx        Social History:   Social History     Socioeconomic History    Marital status:  "   Tobacco Use    Smoking status: Never    Smokeless tobacco: Never   Vaping Use    Vaping status: Never Used   Substance and Sexual Activity    Alcohol use: Not Currently    Drug use: Never    Sexual activity: Defer       Tobacco History:   Social History     Tobacco Use   Smoking Status Never   Smokeless Tobacco Never       Medications:     Current Outpatient Medications:     acetaminophen (TYLENOL) 500 MG tablet, Take 1 tablet by mouth Every 6 (Six) Hours As Needed for Mild Pain. Indications: Pain, Disp: , Rfl:     amLODIPine (NORVASC) 5 MG tablet, Take 1 tablet by mouth Daily., Disp: 30 tablet, Rfl: 5    atorvastatin (LIPITOR) 10 MG tablet, Take 1 tablet by mouth Every Night., Disp: 90 tablet, Rfl: 3    calcium carbonate (OS-EVELIO) 600 MG tablet, Take 1 tablet by mouth Daily. Indications: Low Amount of Calcium in the Blood, Disp: , Rfl:     lisinopril (PRINIVIL,ZESTRIL) 40 MG tablet, Take 1 tablet by mouth Daily., Disp: 90 tablet, Rfl: 1    metoprolol tartrate (LOPRESSOR) 100 MG tablet, TAKE 1 TABLET BY MOUTH TWICE DAILY, Disp: 180 tablet, Rfl: 0    omeprazole (priLOSEC) 40 MG capsule, TAKE 1 CAPSULE BY MOUTH DAILY, Disp: 90 capsule, Rfl: 0    Allergies:   No Known Allergies  The following portions of the patient's history were reviewed and updated as appropriate: allergies, current medications, past family history, past medical history, past social history, past surgical history and problem list.    Review of Systems   12 point ROS negative except for that listed in the HPI.         Objective:     /74   Pulse 80   Ht 154.9 cm (61\")   Wt 74.4 kg (164 lb)   SpO2 94%   BMI 30.99 kg/m²      Physical Exam  Constitutional: Patient appears well-developed and well-nourished.   HENT: HEENT exam unremarkable.   Neck: Neck supple. No JVD present. No carotid bruits.   Cardiovascular: Normal rate, regular rhythm and normal heart sounds. No murmur heard.   2+ symmetric pulses.   Pulmonary/Chest: Breath sounds " "normal. Does not exhibit tenderness.   Abdominal: Abdomen benign.   Musculoskeletal: Does not exhibit edema.   Neurological: Neurological exam unremarkable.   Vitals reviewed.    Data Review:   Lab Results   Component Value Date    GLUCOSE 92 08/22/2024    BUN 12 08/22/2024    CREATININE 0.98 08/22/2024    BCR 12.2 08/22/2024    K 4.3 08/22/2024    CO2 24.8 08/22/2024    CALCIUM 9.9 08/22/2024    ALBUMIN 4.0 08/22/2024    AST 25 08/22/2024    ALT 20 08/22/2024     Lab Results   Component Value Date    CHOL 113 08/22/2024    TRIG 194 (H) 08/22/2024    HDL 28 (L) 08/22/2024    LDL 53 08/22/2024      Lab Results   Component Value Date    WBC 4.87 08/22/2024    RBC 4.60 08/22/2024    HGB 12.6 08/22/2024    HCT 38.7 08/22/2024    MCV 84.1 08/22/2024     08/22/2024     Lab Results   Component Value Date    TSH 2.150 06/25/2024     No results found for: \"HGBA1C\"     Procedures    EKG of June 25, 2024 shows sinus rhythm with inferior and lateral T wave inversions and LVH.    Advance Care Planning   ACP discussion was declined by the patient. Patient does not have an advance directive, declines further assistance.       Assessment:      Diagnosis Plan   1. Hypertensive heart disease without heart failure  EKG showing LVH with strain and some ischemic changes.  We will obtain a GXT Cardiolite stress test for ischemic evaluation due to multiple risk factors and abnormal ECG.  Also obtain echocardiogram for further assessment.      2. Primary hypertension  Controlled at present, continue current medical regimen.  Continue to monitor blood pressure at home with goal of less than 140 systolic.      3. Abnormal EKG  GXT/Cardiolite stress test.      4. Dyslipidemia  Continue statin therapy.          Plan:   The patient has multiple cardiac risk factors and abnormal ECG.  Recent ER visit due to hypertensive urgency.  EKG is showing features of hypertensive heart disease and ischemia.  We will obtain echocardiogram and " Cardiolite GXT for further assessment.  Heart healthy diet, regular exercise and close monitoring of blood pressure with goal of less than 40 systolic discussed.  Continue current medications.   FU in 3 MO, sooner as needed.  Thank you for allowing us to participate in the care of your patient.       Jeyson Olivo MD, FAC, [facility]      Part of this note may be an electronic transcription/translation of spoken language to printed text using the Dragon Dictation System.

## 2024-08-28 ENCOUNTER — OFFICE VISIT (OUTPATIENT)
Dept: CARDIOLOGY | Facility: CLINIC | Age: 69
End: 2024-08-28
Payer: MEDICARE

## 2024-08-28 VITALS
HEIGHT: 61 IN | DIASTOLIC BLOOD PRESSURE: 74 MMHG | OXYGEN SATURATION: 94 % | HEART RATE: 80 BPM | SYSTOLIC BLOOD PRESSURE: 114 MMHG | WEIGHT: 164 LBS | BODY MASS INDEX: 30.96 KG/M2

## 2024-08-28 DIAGNOSIS — E78.5 DYSLIPIDEMIA: ICD-10-CM

## 2024-08-28 DIAGNOSIS — I10 PRIMARY HYPERTENSION: ICD-10-CM

## 2024-08-28 DIAGNOSIS — R94.31 ABNORMAL EKG: ICD-10-CM

## 2024-08-28 DIAGNOSIS — I11.9 HYPERTENSIVE HEART DISEASE WITHOUT HEART FAILURE: Primary | ICD-10-CM

## 2024-09-18 ENCOUNTER — HOSPITAL ENCOUNTER (OUTPATIENT)
Dept: CARDIOLOGY | Facility: HOSPITAL | Age: 69
Discharge: HOME | End: 2024-09-18
Payer: MEDICARE

## 2024-09-18 VITALS
HEIGHT: 61 IN | SYSTOLIC BLOOD PRESSURE: 136 MMHG | BODY MASS INDEX: 30.97 KG/M2 | WEIGHT: 164.02 LBS | DIASTOLIC BLOOD PRESSURE: 86 MMHG

## 2024-09-18 DIAGNOSIS — R94.31 ABNORMAL EKG: ICD-10-CM

## 2024-09-18 DIAGNOSIS — I10 PRIMARY HYPERTENSION: ICD-10-CM

## 2024-09-18 DIAGNOSIS — I11.9 HYPERTENSIVE HEART DISEASE WITHOUT HEART FAILURE: ICD-10-CM

## 2024-09-18 DIAGNOSIS — E78.5 DYSLIPIDEMIA: ICD-10-CM

## 2024-09-18 LAB
ASCENDING AORTA: 3.7 CM
BH CV ECHO MEAS - AO MAX PG: 6.7 MMHG
BH CV ECHO MEAS - AO MEAN PG: 4 MMHG
BH CV ECHO MEAS - AO ROOT DIAM: 2.9 CM
BH CV ECHO MEAS - AO V2 MAX: 129 CM/SEC
BH CV ECHO MEAS - EF(MOD-BP): 63.5 %
BH CV ECHO MEAS - IVS/LVPW: 1 CM
BH CV ECHO MEAS - IVSD: 0.8 CM
BH CV ECHO MEAS - LA DIMENSION: 3 CM
BH CV ECHO MEAS - LAT PEAK E' VEL: 9.6 CM/SEC
BH CV ECHO MEAS - LV MAX PG: 6.3 MMHG
BH CV ECHO MEAS - LV MEAN PG: 2.6 MMHG
BH CV ECHO MEAS - LV V1 MAX: 125.8 CM/SEC
BH CV ECHO MEAS - LV V1 VTI: 24 CM
BH CV ECHO MEAS - LVIDD: 3.4 CM
BH CV ECHO MEAS - LVIDS: 2.1 CM
BH CV ECHO MEAS - LVOT DIAM: 2 CM
BH CV ECHO MEAS - LVPWD: 0.8 CM
BH CV ECHO MEAS - MED PEAK E' VEL: 5.6 CM/SEC
BH CV ECHO MEAS - MV A MAX VEL: 78 CM/SEC
BH CV ECHO MEAS - MV E MAX VEL: 58.7 CM/SEC
BH CV ECHO MEAS - MV E/A: 0.75
BH CV ECHO MEAS - MV MAX PG: 3.6 MMHG
BH CV ECHO MEAS - MV MEAN PG: 2 MMHG
BH CV ECHO MEAS - MV P1/2T: 67 MSEC
BH CV ECHO MEAS - PA ACC TIME: 0.05 SEC
BH CV ECHO MEAS - RAP SYSTOLE: 3 MMHG
BH CV ECHO MEAS - RVSP: 27.2 MMHG
BH CV ECHO MEAS - TAPSE (>1.6): 1.57 CM
BH CV ECHO MEAS - TR MAX PG: 24.2 MMHG
BH CV ECHO MEAS - TR MAX VEL: 231.7 CM/SEC
BH CV ECHO MEASUREMENTS AVERAGE E/E' RATIO: 7.72
BH CV XLRA - RV BASE: 3.4 CM
BH CV XLRA - RV LENGTH: 5.4 CM
BH CV XLRA - RV MID: 2.5 CM
BH CV XLRA - TDI S': 9.2 CM/SEC
LEFT ATRIUM VOLUME INDEX: 25.7 ML/M2
LV EF 2D ECHO EST: 60 %

## 2024-09-18 PROCEDURE — 0 TECHNETIUM SESTAMIBI: Performed by: INTERNAL MEDICINE

## 2024-09-18 PROCEDURE — 78452 HT MUSCLE IMAGE SPECT MULT: CPT

## 2024-09-18 PROCEDURE — 93017 CV STRESS TEST TRACING ONLY: CPT

## 2024-09-18 PROCEDURE — 25010000002 REGADENOSON 0.4 MG/5ML SOLUTION: Performed by: INTERNAL MEDICINE

## 2024-09-18 PROCEDURE — A9500 TC99M SESTAMIBI: HCPCS | Performed by: INTERNAL MEDICINE

## 2024-09-18 PROCEDURE — 93306 TTE W/DOPPLER COMPLETE: CPT

## 2024-09-18 RX ORDER — REGADENOSON 0.08 MG/ML
0.4 INJECTION, SOLUTION INTRAVENOUS ONCE
Status: COMPLETED | OUTPATIENT
Start: 2024-09-18 | End: 2024-09-18

## 2024-09-18 RX ADMIN — TECHNETIUM TC 99M SESTAMIBI 1 DOSE: 1 INJECTION INTRAVENOUS at 12:35

## 2024-09-18 RX ADMIN — REGADENOSON 0.4 MG: 0.08 INJECTION, SOLUTION INTRAVENOUS at 12:32

## 2024-09-18 RX ADMIN — TECHNETIUM TC 99M SESTAMIBI 1 DOSE: 1 INJECTION INTRAVENOUS at 10:24

## 2024-09-19 ENCOUNTER — TELEPHONE (OUTPATIENT)
Dept: CARDIOLOGY | Facility: CLINIC | Age: 69
End: 2024-09-19
Payer: MEDICARE

## 2024-09-19 LAB
BH CV REST NUCLEAR ISOTOPE DOSE: 9.9 MCI
BH CV STRESS DURATION MIN STAGE 1: 0
BH CV STRESS DURATION SEC STAGE 1: 45
BH CV STRESS GRADE STAGE 1: 10
BH CV STRESS METS STAGE 1: 5
BH CV STRESS NUCLEAR ISOTOPE DOSE: 33 MCI
BH CV STRESS PROTOCOL 1: NORMAL
BH CV STRESS PROTOCOL 2 BP STAGE 2: NORMAL
BH CV STRESS PROTOCOL 2 BP STAGE 3: 130
BH CV STRESS PROTOCOL 2 BP STAGE 4: 131
BH CV STRESS PROTOCOL 2 COMMENTS STAGE 1: NORMAL
BH CV STRESS PROTOCOL 2 DOSE REGADENOSON STAGE 1: 0.4
BH CV STRESS PROTOCOL 2 DURATION MIN STAGE 1: 1
BH CV STRESS PROTOCOL 2 DURATION MIN STAGE 2: 1
BH CV STRESS PROTOCOL 2 DURATION MIN STAGE 3: 1
BH CV STRESS PROTOCOL 2 DURATION MIN STAGE 4: 1
BH CV STRESS PROTOCOL 2 DURATION SEC STAGE 1: 0
BH CV STRESS PROTOCOL 2 DURATION SEC STAGE 2: 0
BH CV STRESS PROTOCOL 2 DURATION SEC STAGE 3: 0
BH CV STRESS PROTOCOL 2 DURATION SEC STAGE 4: 0
BH CV STRESS PROTOCOL 2 HR STAGE 1: 125
BH CV STRESS PROTOCOL 2 HR STAGE 2: 133
BH CV STRESS PROTOCOL 2 HR STAGE 4: NORMAL
BH CV STRESS PROTOCOL 2 O2 STAGE 1: 99
BH CV STRESS PROTOCOL 2 O2 STAGE 2: 99
BH CV STRESS PROTOCOL 2 O2 STAGE 3: 100
BH CV STRESS PROTOCOL 2 O2 STAGE 4: 100
BH CV STRESS PROTOCOL 2 STAGE 1: 1
BH CV STRESS PROTOCOL 2 STAGE 2: 2
BH CV STRESS PROTOCOL 2 STAGE 3: 3
BH CV STRESS PROTOCOL 2 STAGE 4: 4
BH CV STRESS PROTOCOL 2: NORMAL
BH CV STRESS RECOVERY BP: NORMAL MMHG
BH CV STRESS RECOVERY HR: 99 BPM
BH CV STRESS RECOVERY O2: 100 %
BH CV STRESS SPEED STAGE 1: 1.7
BH CV STRESS STAGE 1: 1
LV EF NUC BP: 68 %
MAXIMAL PREDICTED HEART RATE: 150 BPM
PERCENT MAX PREDICTED HR: 88.67 %
STRESS BASELINE BP: NORMAL MMHG
STRESS BASELINE HR: 100 BPM
STRESS O2 SAT REST: 99 %
STRESS PERCENT HR: 104 %
STRESS POST ESTIMATED WORKLOAD: 1 METS
STRESS POST EXERCISE DUR MIN: 4 MIN
STRESS POST EXERCISE DUR SEC: 0 SEC
STRESS POST O2 SAT PEAK: 100 %
STRESS POST PEAK BP: NORMAL MMHG
STRESS POST PEAK HR: 133 BPM
STRESS TARGET HR: 128 BPM

## 2024-09-26 ENCOUNTER — TELEPHONE (OUTPATIENT)
Dept: FAMILY MEDICINE CLINIC | Facility: CLINIC | Age: 69
End: 2024-09-26

## 2024-09-26 NOTE — TELEPHONE ENCOUNTER
Caller: Falguni Laureano    Relationship: Self    Best call back number: [phone]     What is the best time to reach you: ANYTIME, OK TO LEAVE VOICEMAIL.    Who are you requesting to speak with (clinical staff, provider,  specific staff member): CLINICAL STAFF    Do you know the name of the person who called: PATIENT    What was the call regarding: PATIENT ADVISES THAT SHE WOULD LIKE A CALL BACK TO DISCUSS HER HIGH CHOLESTEROL. PATIENT WAS ADVISED BY ANOTHER PROVIDER TO FOLLOW UP WITH PRIMARY CARE PROVIDER.    Is it okay if the provider responds through MyChart: NO CALL ONLY

## 2024-09-26 NOTE — TELEPHONE ENCOUNTER
Called and spoke to patient.  She is still taking her cholesterol medication.  She just wanted me to know that her cardiologist had noticed that it was high, but he did not recommend changing her medicine at this time.  Will recheck in 2 months and if we need to increase her dose we will.  Recommend decreasing carbohydrates and saturated fats in her diet to help with triglycerides.  She has no further questions at this time.

## 2024-10-02 ENCOUNTER — TELEPHONE (OUTPATIENT)
Dept: FAMILY MEDICINE CLINIC | Facility: CLINIC | Age: 69
End: 2024-10-02
Payer: MEDICARE

## 2024-10-02 DIAGNOSIS — I10 HYPERTENSION, UNSPECIFIED TYPE: ICD-10-CM

## 2024-10-02 DIAGNOSIS — I10 PRIMARY HYPERTENSION: Primary | ICD-10-CM

## 2024-10-02 RX ORDER — AMLODIPINE BESYLATE 10 MG/1
10 TABLET ORAL DAILY
Qty: 90 TABLET | Refills: 1 | Status: SHIPPED | OUTPATIENT
Start: 2024-10-02

## 2024-10-02 RX ORDER — LISINOPRIL 40 MG/1
40 TABLET ORAL DAILY
Qty: 90 TABLET | Refills: 1 | Status: SHIPPED | OUTPATIENT
Start: 2024-10-02

## 2024-10-02 NOTE — TELEPHONE ENCOUNTER
Caller: Falguni Laureano    Relationship: Self    Best call back number: [phone]     Requested Prescriptions:   Requested Prescriptions     Pending Prescriptions Disp Refills    lisinopril (PRINIVIL,ZESTRIL) 40 MG tablet 90 tablet 1     Sig: Take 1 tablet by mouth Daily.      PATIENT TAKES 2 A DAY  Pharm[facility] where request should be sent: [id number] - [place], [place] - [facility] [address] AT [facility] [address] - [phone] [facility] [phone] FX     Last office visit with prescribing clinician: 8/22/2024   Last telemedicine visit with prescribing clinician: Visit date not found   Next office visit with prescribing clinician: 11/22/2024     Additional details provided by patient: OUT OF MEDICATION    Does the patient have less than a 3 day supply:  [x] Yes  [] No    Would you like a call back once the refill request has been completed: [] Yes [x] No    If the office needs to give you a call back, can they leave a voicemail: [] Yes [x] No    Collins Romero Rep   10/02/24 10:31 EDT

## 2024-10-02 NOTE — TELEPHONE ENCOUNTER
Rx Refill Note  Requested Prescriptions     Pending Prescriptions Disp Refills    lisinopril (PRINIVIL,ZESTRIL) 40 MG tablet 90 tablet 1     Sig: Take 1 tablet by mouth Daily.      Last office visit with prescribing clinician: 8/22/2024   Last telemedicine visit with prescribing clinician: Visit date not found   Next office visit with prescribing clinician: 11/22/2024                         Would you like a call back once the refill request has been completed: [] Yes [] No    If the office needs to give you a call back, can they leave a voicemail: [] Yes [] No    Suyapa Gasca MA  10/02/24, 13:20 EDT

## 2024-10-02 NOTE — TELEPHONE ENCOUNTER
The pharmacist (Aylin) called from [facility]. Requesting a new prescription be sent in for Amlodipine 10mg x 1 daily. Because the one the patient has is 5mg and she was taking two and the prescription has run out. Fiona Fitch cancelled out the Amlodipine 5mg and sent in a new prescription for Amlodipine 10mg. So, the patient will be taking  Amlodipine 10 x (1) daily and  Lisinopril 40mg x (1) daily.    The pharmacist will explain this to the patient.

## 2024-10-08 DIAGNOSIS — I10 PRIMARY HYPERTENSION: ICD-10-CM

## 2024-10-08 RX ORDER — METOPROLOL TARTRATE 100 MG
100 TABLET ORAL 2 TIMES DAILY
Qty: 180 TABLET | Refills: 0 | Status: SHIPPED | OUTPATIENT
Start: 2024-10-08 | End: 2024-10-09

## 2024-10-08 NOTE — TELEPHONE ENCOUNTER
Rx Refill Note  Requested Prescriptions     Pending Prescriptions Disp Refills    metoprolol tartrate (LOPRESSOR) 100 MG tablet 180 tablet 0     Sig: Take 1 tablet by mouth 2 (Two) Times a Day.      Last office visit with prescribing clinician: 8/22/2024   Last telemedicine visit with prescribing clinician: Visit date not found   Next office visit with prescribing clinician: 11/22/2024                         Would you like a call back once the refill request has been completed: [] Yes [] No    If the office needs to give you a call back, can they leave a voicemail: [] Yes [] No    Suyapa Gasca MA  10/08/24, 14:58 EDT

## 2024-10-08 NOTE — TELEPHONE ENCOUNTER
Caller: Falguni Laureano    Relationship: Self    Best call back number:     [phone] (Mobile)     Requested Prescriptions:   Requested Prescriptions     Pending Prescriptions Disp Refills    metoprolol tartrate (LOPRESSOR) 100 MG tablet 180 tablet 0     Sig: Take 1 tablet by mouth 2 (Two) Times a Day.      Pharmacy where request should be sent:     [id number] - [place], [place] - [address] AT [facility] [address] - [phone] [facility] [phone] FX     Last office visit with prescribing clinician: 8/22/2024   Last telemedicine visit with prescribing clinician: Visit date not found   Next office visit with prescribing clinician: 11/22/2024     Additional details provided by patient:     PATIENT STATED SHE IS OUT OF THE MEDICATION    Does the patient have less than a 3 day supply:  [x] Yes  [] No    Would you like a call back once the refill request has been completed: [] Yes [] No    If the office needs to give you a call back, can they leave a voicemail: [] Yes [] No    Collins May Rep   10/08/24 14:54 EDT

## 2024-10-09 RX ORDER — METOPROLOL TARTRATE 100 MG
100 TABLET ORAL 2 TIMES DAILY
Qty: 180 TABLET | Refills: 0 | Status: SHIPPED | OUTPATIENT
Start: 2024-10-09

## 2024-10-23 ENCOUNTER — OFFICE VISIT (OUTPATIENT)
Age: 69
End: 2024-10-23
Payer: MEDICARE

## 2024-10-23 DIAGNOSIS — F32.9 REACTIVE DEPRESSION: Primary | ICD-10-CM

## 2024-10-23 NOTE — PROGRESS NOTES
Adult Follow Up       Date Encounter: 10/23/2024   Name: Falguni Laureano  MRN: [id number]  : 1955    Time In: 10:05  Time Out: 10:50     Referring Provider: Fiona Fitch PA-C    Chief Complaint:      ICD-10-CM ICD-9-CM   1. Reactive depression  F32.9 300.4        History of Present Illness:   Falguni Laureano is a 69 y.o. female who is being seen today for follow-up behavioral health therapy visit to continue to understand patient's mental health needs and to continue to discuss therapy goals using psychotherapy and evidence-based tools in attempt to reduce symptoms of depression.  Patient continued to discuss her family dynamics with regard to her 's death that occurred about 4 years ago and she is still feeling emotional stress from the loss of her  but also the loss of connecting with some family members after the event.  Patient was informed that this therapist was leaving the practice and will conduct her to the next therapist who will begin practicing in 2024.  Her preference was to stay with this practice for therapy sessions.  Patient was calm and pleasant during therapy session today and was actively engaged in discussion.  PHQ-9 and ARDEN-7 were not completed during visit today, but next therapist will initiate appropriately.      Subjective     Screening Scores:       SUICIDE RISK ASSESSMENT/CSSRS  Does patient have thoughts of suicide? Patient reported previous thoughts of suicide when her  .  Patient denied having current thoughts or plan, but was provided with 988 and instructions to go to nearest ER if needed.    Does patient have a plan for suicide? no    Medications:     Current Outpatient Medications:     acetaminophen (TYLENOL) 500 MG tablet, Take 1 tablet by mouth Every 6 (Six) Hours As Needed for Mild Pain. Indications: Pain, Disp: , Rfl:     amLODIPine (NORVASC) 10 MG tablet, Take 1 tablet by mouth Daily., Disp: 90 tablet, Rfl: 1    atorvastatin  (LIPITOR) 10 MG tablet, Take 1 tablet by mouth Every Night., Disp: 90 tablet, Rfl: 3    calcium carbonate (OS-EVELIO) 600 MG tablet, Take 1 tablet by mouth Daily. Indications: Low Amount of Calcium in the Blood, Disp: , Rfl:     lisinopril (PRINIVIL,ZESTRIL) 40 MG tablet, Take 1 tablet by mouth Daily., Disp: 90 tablet, Rfl: 1    metoprolol tartrate (LOPRESSOR) 100 MG tablet, TAKE 1 TABLET BY MOUTH TWICE DAILY, Disp: 180 tablet, Rfl: 0    omeprazole (priLOSEC) 40 MG capsule, TAKE 1 CAPSULE BY MOUTH DAILY, Disp: 90 capsule, Rfl: 0    Allergies:   No Known Allergies     Objective       Mental Status Exam:   MENTAL STATUS EXAM   General Appearance:  Cleanly groomed and dressed  Eye Contact:  Good eye contact  Motor Activity:  Normal gait, posture  Muscle Strength:  Normal  Speech:  Normal rate, tone, volume  Language:  Spontaneous  Mood and affect:  Normal, pleasant and blunted  Thought Process:  Logical, circumstantial, derailments and linear  Associations/ Thought Content:  No delusions  Hallucinations:  Other  Other Comment:  Hearing her  who is .  Suicidal Ideations:  Not present  Homicidal Ideation:  Not present  Sensorium:  Alert and clear  Orientation:  Person, place, time and situation  Immediate Recall, Recent, and Remote Memory:  Intact  Attention Span/ Concentration:  Selective attention  Fund of Knowledge:  Appropriate for age and educational level  Intellectual Functioning:  Average range  Insight:  Fair  Judgement:  Fair  Reliability:  Fair  Impulse Control:  Good      Assessment / Plan      Visit Diagnosis/Orders Placed This Visit:    ICD-10-CM ICD-9-CM   1. Reactive depression  F32.9 300.4        PLAN:    Progress toward goal: Not at goal    Prognosis: Fair with ongoing treatment    Safety: Patient denies SI/HI.  Therapist and patient reviewed options for help including 818, 900 the suicide hotline, and going to the neared ER.  Therapist will continue to monitor.   Risk Assessment: Risk of  self-harm acutely is low. Risk of self-harm chronically is also low, but could be further elevated in the event of treatment noncompliance and/or substance abuse.     Treatment Plan/Goals: Continue supportive psychotherapy efforts and medications as indicated. Treatment and medication options discussed during today's visit. Patient acknowledged and verbally consented to continue with current treatment plan and was educated on the importance of compliance with treatment and follow-up appointments. Patient seems reasonably determined to adhere to treatment plan.      Assisted Patient in processing above session content; acknowledged and normalized patient’s thoughts, feelings, and concerns.  Rationalized patient thought process regarding patient's report of confusion about what she wants to do with her life, and therapist gave her encouragement as she has good health and mobility.  Patient reported having concerns with connecting to others through typically phone calls where if she does not get a return call she does not pursue the relationship further.  Therapist asked her to consider calling again to provide a second chance, especially if that action will result in a healthy connection to other people.  Therapist continued to discuss the [facility] as an option for socialization and to do activities and patient reported she will attempt to visit in the next few months.  Patient reported she speaks to her father, , and grandson, who are all  but provides her comfort in decision making.  Patient expressed interest in staying with this practice for mental health support and she was made an appointment today with future therapist for 2024.    Allowed Patient to freely discuss issues  without interruption or judgement with unconditional positive regard, active listening skills, and empathy. Therapist provided a safe, confidential environment to facilitate the development of a positive  therapeutic relationship and encouraged open, honest communication. Assisted Patient in identifying risk factors which would indicate the need for higher level of care including thoughts to harm self or others and/or self-harming behavior and encouraged Patient to contact this office during business hours, call 911, or present to the nearest emergency room should any of these events occur. Discussed crisis intervention services and means to access. Patient adamantly and convincingly denies current suicidal or homicidal ideation or perceptual disturbance. Assisted Patient in processing session content; acknowledged and normalized Patient’s thoughts, feelings, and concerns by utilizing a person-centered approach in efforts to build appropriate rapport and a positive therapeutic relationship with open and honest communication.     Follow Up:   No follow-ups on file.      This document has been electronically signed by Sammie Francois LCSW  October 23, 2024 11:34 EDT

## 2024-10-26 DIAGNOSIS — E78.5 DYSLIPIDEMIA: ICD-10-CM

## 2024-10-28 RX ORDER — ATORVASTATIN CALCIUM 10 MG/1
10 TABLET, FILM COATED ORAL NIGHTLY
Qty: 90 TABLET | Refills: 3 | Status: SHIPPED | OUTPATIENT
Start: 2024-10-28

## 2024-10-28 NOTE — TELEPHONE ENCOUNTER
Rx Refill Note  Requested Prescriptions     Pending Prescriptions Disp Refills    atorvastatin (LIPITOR) 10 MG tablet [Pharmacy Med Name: ATORVASTATIN 10MG TABLETS] 90 tablet 3     Sig: TAKE 1 TABLET BY MOUTH EVERY NIGHT      Last office visit with prescribing clinician: 8/22/2024   Last telemedicine visit with prescribing clinician: Visit date not found   Next office visit with prescribing clinician: 11/22/2024                         Would you like a call back once the refill request has been completed: [] Yes [] No    If the office needs to give you a call back, can they leave a voicemail: [] Yes [] No    Suyapa Gasca MA  10/28/24, 10:47 EDT

## 2024-11-13 DIAGNOSIS — K21.9 GASTROESOPHAGEAL REFLUX DISEASE, UNSPECIFIED WHETHER ESOPHAGITIS PRESENT: ICD-10-CM

## 2024-11-13 RX ORDER — OMEPRAZOLE 40 MG/1
40 CAPSULE, DELAYED RELEASE ORAL DAILY
Qty: 90 CAPSULE | Refills: 0 | Status: SHIPPED | OUTPATIENT
Start: 2024-11-13

## 2024-11-13 NOTE — TELEPHONE ENCOUNTER
Caller: Sridevi Falguni JOSE DAVID    Relationship: Self    Best call back number: [phone]    Requested Prescriptions:   Requested Prescriptions     Pending Prescriptions Disp Refills    omeprazole (priLOSEC) 40 MG capsule 90 capsule 0     Sig: Take 1 capsule by mouth Daily. Indications: Heartburn        Pharmacy where request should be sent:    [phone]  Last office visit with prescribing clinician: 8/22/2024   Last telemedicine visit with prescribing clinician: Visit date not found   Next office visit with prescribing clinician: 11/22/2024     Additional details provided by patient: PATIENT IS OUT OF MEDICATION    Does the patient have less than a 3 day supply:  [x] Yes  [] No    Would you like a call back once the refill request has been completed: [] Yes [x] No    If the office needs to give you a call back, can they leave a voicemail: [] Yes [x] No    Collins Jarvis Rep   11/13/24 10:49 EST

## 2024-11-22 ENCOUNTER — OFFICE VISIT (OUTPATIENT)
Dept: FAMILY MEDICINE CLINIC | Facility: CLINIC | Age: 69
End: 2024-11-22
Payer: MEDICARE

## 2024-11-22 VITALS
WEIGHT: 165.8 LBS | DIASTOLIC BLOOD PRESSURE: 82 MMHG | OXYGEN SATURATION: 98 % | SYSTOLIC BLOOD PRESSURE: 124 MMHG | BODY MASS INDEX: 31.3 KG/M2 | HEIGHT: 61 IN | HEART RATE: 73 BPM

## 2024-11-22 DIAGNOSIS — Z23 NEEDS FLU SHOT: ICD-10-CM

## 2024-11-22 DIAGNOSIS — I10 PRIMARY HYPERTENSION: ICD-10-CM

## 2024-11-22 DIAGNOSIS — F33.2 SEVERE EPISODE OF RECURRENT MAJOR DEPRESSIVE DISORDER, WITHOUT PSYCHOTIC FEATURES: Primary | ICD-10-CM

## 2024-11-22 PROCEDURE — 1159F MED LIST DOCD IN RCRD: CPT

## 2024-11-22 PROCEDURE — 99214 OFFICE O/P EST MOD 30 MIN: CPT

## 2024-11-22 PROCEDURE — 3079F DIAST BP 80-89 MM HG: CPT

## 2024-11-22 PROCEDURE — 1160F RVW MEDS BY RX/DR IN RCRD: CPT

## 2024-11-22 PROCEDURE — 3074F SYST BP LT 130 MM HG: CPT

## 2024-11-22 PROCEDURE — G0008 ADMIN INFLUENZA VIRUS VAC: HCPCS

## 2024-11-22 PROCEDURE — 1126F AMNT PAIN NOTED NONE PRSNT: CPT

## 2024-11-22 PROCEDURE — 90662 IIV NO PRSV INCREASED AG IM: CPT

## 2024-11-22 RX ORDER — ESCITALOPRAM OXALATE 5 MG/1
5 TABLET ORAL DAILY
Qty: 30 TABLET | Refills: 5 | Status: SHIPPED | OUTPATIENT
Start: 2024-11-22

## 2024-11-22 NOTE — ASSESSMENT & PLAN NOTE
Sleep not improving.  Believe is still related to her depression and continues nightly.  Considered possible prazosin for nightmare like symptoms, but do not want to drop her blood pressure anymore.  Have previously discussed chronic medication for her depression which patient is interested in today.  Plan to continue counseling and will start Lexapro 5 mg.  Discussed potential side effects and adverse reactions.  Patient advised to stop the medication if she has any concerning symptoms that we discussed and seek evaluation.  Advised to take this medication at night as it can make you drowsy.  Can take 6 to 8 weeks to see full benefit.  Patient due for her Medicare wellness at the beginning of February.  Plan to follow-up then to discuss improvement or lack of improvement
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
## 2024-11-22 NOTE — ASSESSMENT & PLAN NOTE
Hypertension is stable and controlled  Continue current treatment regimen.  Dietary sodium restriction.  Weight loss.  Blood pressure will be reassessed  next appointment .
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
## 2024-12-19 LAB
NCCN CRITERIA FLAG: NORMAL
TYRER CUZICK SCORE: 3.1

## 2024-12-20 ENCOUNTER — OFFICE VISIT (OUTPATIENT)
Dept: CARDIOLOGY | Facility: CLINIC | Age: 69
End: 2024-12-20
Payer: MEDICARE

## 2024-12-20 VITALS
OXYGEN SATURATION: 96 % | BODY MASS INDEX: 32.17 KG/M2 | SYSTOLIC BLOOD PRESSURE: 130 MMHG | HEART RATE: 71 BPM | HEIGHT: 61 IN | WEIGHT: 170.4 LBS | DIASTOLIC BLOOD PRESSURE: 72 MMHG

## 2024-12-20 DIAGNOSIS — E78.5 DYSLIPIDEMIA: ICD-10-CM

## 2024-12-20 DIAGNOSIS — I11.9 HYPERTENSIVE HEART DISEASE WITHOUT HEART FAILURE: Primary | ICD-10-CM

## 2024-12-20 DIAGNOSIS — I10 PRIMARY HYPERTENSION: ICD-10-CM

## 2025-01-01 ENCOUNTER — LAB (OUTPATIENT)
Dept: LAB | Facility: HOSPITAL | Age: 70
End: 2025-01-01
Payer: MEDICARE

## 2025-01-01 ENCOUNTER — APPOINTMENT (OUTPATIENT)
Dept: GENERAL RADIOLOGY | Facility: HOSPITAL | Age: 70
DRG: 270 | End: 2025-01-01
Payer: MEDICARE

## 2025-01-01 ENCOUNTER — APPOINTMENT (OUTPATIENT)
Dept: CT IMAGING | Facility: HOSPITAL | Age: 70
DRG: 270 | End: 2025-01-01
Payer: COMMERCIAL

## 2025-01-01 ENCOUNTER — OFFICE VISIT (OUTPATIENT)
Dept: FAMILY MEDICINE CLINIC | Facility: CLINIC | Age: 70
End: 2025-01-01
Payer: MEDICARE

## 2025-01-01 ENCOUNTER — HOSPITAL ENCOUNTER (INPATIENT)
Facility: HOSPITAL | Age: 70
LOS: 1 days | DRG: 270 | End: 2025-04-07
Attending: EMERGENCY MEDICINE | Admitting: INTERNAL MEDICINE
Payer: MEDICARE

## 2025-01-01 ENCOUNTER — APPOINTMENT (OUTPATIENT)
Dept: CARDIOLOGY | Facility: HOSPITAL | Age: 70
DRG: 270 | End: 2025-01-01
Payer: MEDICARE

## 2025-01-01 VITALS
DIASTOLIC BLOOD PRESSURE: 84 MMHG | HEART RATE: 84 BPM | OXYGEN SATURATION: 98 % | TEMPERATURE: 97.5 F | SYSTOLIC BLOOD PRESSURE: 130 MMHG | WEIGHT: 173.8 LBS | BODY MASS INDEX: 32.81 KG/M2 | HEIGHT: 61 IN

## 2025-01-01 VITALS
HEIGHT: 61 IN | SYSTOLIC BLOOD PRESSURE: 85 MMHG | OXYGEN SATURATION: 100 % | RESPIRATION RATE: 24 BRPM | TEMPERATURE: 96.8 F | WEIGHT: 175 LBS | BODY MASS INDEX: 33.04 KG/M2 | HEART RATE: 94 BPM | DIASTOLIC BLOOD PRESSURE: 25 MMHG

## 2025-01-01 DIAGNOSIS — J96.01 ACUTE RESPIRATORY FAILURE WITH HYPOXIA: ICD-10-CM

## 2025-01-01 DIAGNOSIS — R57.0 CARDIOGENIC SHOCK: ICD-10-CM

## 2025-01-01 DIAGNOSIS — R92.8 ABNORMAL MAMMOGRAM OF LEFT BREAST: Primary | ICD-10-CM

## 2025-01-01 DIAGNOSIS — K21.9 GASTROESOPHAGEAL REFLUX DISEASE, UNSPECIFIED WHETHER ESOPHAGITIS PRESENT: ICD-10-CM

## 2025-01-01 DIAGNOSIS — I46.9 CARDIOPULMONARY ARREST: Primary | ICD-10-CM

## 2025-01-01 DIAGNOSIS — I46.9 CARDIAC ARREST: ICD-10-CM

## 2025-01-01 DIAGNOSIS — Z86.19 HX OF HEPATITIS C: ICD-10-CM

## 2025-01-01 DIAGNOSIS — I21.3 ST ELEVATION MYOCARDIAL INFARCTION (STEMI), UNSPECIFIED ARTERY: ICD-10-CM

## 2025-01-01 DIAGNOSIS — I10 PRIMARY HYPERTENSION: ICD-10-CM

## 2025-01-01 DIAGNOSIS — E87.20 METABOLIC ACIDOSIS: ICD-10-CM

## 2025-01-01 DIAGNOSIS — R76.8 POSITIVE HEPATITIS C ANTIBODY TEST: ICD-10-CM

## 2025-01-01 DIAGNOSIS — Z12.11 COLON CANCER SCREENING: ICD-10-CM

## 2025-01-01 DIAGNOSIS — R94.4 DECREASED GFR: ICD-10-CM

## 2025-01-01 LAB
ACT BLD: 308 SECONDS (ref 82–152)
ALBUMIN SERPL-MCNC: 3 G/DL (ref 3.5–5.2)
ALBUMIN SERPL-MCNC: 3.1 G/DL (ref 3.5–5.2)
ALBUMIN SERPL-MCNC: 3.1 G/DL (ref 3.5–5.2)
ALBUMIN SERPL-MCNC: 3.2 G/DL (ref 3.5–5.2)
ALBUMIN SERPL-MCNC: 4.2 G/DL (ref 3.5–5.2)
ALBUMIN/GLOB SERPL: 1.2 G/DL
ALBUMIN/GLOB SERPL: 1.3 G/DL
ALBUMIN/GLOB SERPL: 1.4 G/DL
ALBUMIN/GLOB SERPL: 2.1 G/DL
ALBUMIN/GLOB SERPL: 2.7 G/DL
ALP SERPL-CCNC: 50 U/L (ref 39–117)
ALP SERPL-CCNC: 63 U/L (ref 39–117)
ALP SERPL-CCNC: 96 U/L (ref 39–117)
ALP SERPL-CCNC: 98 U/L (ref 39–117)
ALP SERPL-CCNC: 98 U/L (ref 39–117)
ALT SERPL W P-5'-P-CCNC: 14 U/L (ref 1–33)
ALT SERPL W P-5'-P-CCNC: 478 U/L (ref 1–33)
ALT SERPL W P-5'-P-CCNC: 495 U/L (ref 1–33)
ALT SERPL W P-5'-P-CCNC: 715 U/L (ref 1–33)
ALT SERPL W P-5'-P-CCNC: 828 U/L (ref 1–33)
ANION GAP SERPL CALCULATED.3IONS-SCNC: 10 MMOL/L (ref 5–15)
ANION GAP SERPL CALCULATED.3IONS-SCNC: 27 MMOL/L (ref 5–15)
ANION GAP SERPL CALCULATED.3IONS-SCNC: 32 MMOL/L (ref 5–15)
ANION GAP SERPL CALCULATED.3IONS-SCNC: 33 MMOL/L (ref 5–15)
ANION GAP SERPL CALCULATED.3IONS-SCNC: 33 MMOL/L (ref 5–15)
ANION GAP SERPL CALCULATED.3IONS-SCNC: 36 MMOL/L (ref 5–15)
APTT PPP: 136.9 SECONDS (ref 22–39)
APTT PPP: 139.8 SECONDS (ref 22–39)
APTT PPP: 142.4 SECONDS (ref 22–39)
APTT PPP: 164.3 SECONDS (ref 22–39)
APTT PPP: 30.2 SECONDS (ref 60–90)
ARTERIAL PATENCY WRIST A: ABNORMAL
AST SERPL-CCNC: 1216 U/L (ref 1–32)
AST SERPL-CCNC: 1971 U/L (ref 1–32)
AST SERPL-CCNC: 2096 U/L (ref 1–32)
AST SERPL-CCNC: 21 U/L (ref 1–32)
AST SERPL-CCNC: 929 U/L (ref 1–32)
ATMOSPHERIC PRESS: ABNORMAL MM[HG]
BASE EXCESS BLDA CALC-SCNC: -11.1 MMOL/L (ref 0–2)
BASE EXCESS BLDA CALC-SCNC: -15.6 MMOL/L (ref 0–2)
BASE EXCESS BLDA CALC-SCNC: -16 MMOL/L (ref 0–2)
BASE EXCESS BLDA CALC-SCNC: -16.7 MMOL/L (ref 0–2)
BASE EXCESS BLDA CALC-SCNC: -16.8 MMOL/L (ref 0–2)
BASOPHILS # BLD AUTO: 0.02 10*3/MM3 (ref 0–0.2)
BASOPHILS # BLD AUTO: 0.05 10*3/MM3 (ref 0–0.2)
BASOPHILS NFR BLD AUTO: 0.1 % (ref 0–1.5)
BASOPHILS NFR BLD AUTO: 0.6 % (ref 0–1.5)
BDY SITE: ABNORMAL
BILIRUB SERPL-MCNC: 0.5 MG/DL (ref 0–1.2)
BILIRUB SERPL-MCNC: 0.6 MG/DL (ref 0–1.2)
BILIRUB SERPL-MCNC: 0.9 MG/DL (ref 0–1.2)
BODY TEMPERATURE: 37
BUN BLDA-MCNC: 19 MG/DL (ref 8–26)
BUN SERPL-MCNC: 11 MG/DL (ref 8–23)
BUN SERPL-MCNC: 21 MG/DL (ref 8–23)
BUN SERPL-MCNC: 24 MG/DL (ref 8–23)
BUN SERPL-MCNC: 24 MG/DL (ref 8–23)
BUN SERPL-MCNC: 25 MG/DL (ref 8–23)
BUN SERPL-MCNC: 25 MG/DL (ref 8–23)
BUN/CREAT SERPL: 10.8 (ref 7–25)
BUN/CREAT SERPL: 12.5 (ref 7–25)
BUN/CREAT SERPL: 12.6 (ref 7–25)
BUN/CREAT SERPL: 13.2 (ref 7–25)
BUN/CREAT SERPL: 13.4 (ref 7–25)
BUN/CREAT SERPL: 9.4 (ref 7–25)
CA-I BLDA-SCNC: 0.98 MMOL/L (ref 1.2–1.32)
CA-I SERPL ISE-MCNC: 0.87 MMOL/L (ref 1.15–1.3)
CA-I SERPL ISE-MCNC: 0.91 MMOL/L (ref 1.15–1.3)
CA-I SERPL ISE-MCNC: 0.92 MMOL/L (ref 1.15–1.3)
CA-I SERPL ISE-MCNC: 0.94 MMOL/L (ref 1.15–1.3)
CALCIUM SPEC-SCNC: 7 MG/DL (ref 8.6–10.5)
CALCIUM SPEC-SCNC: 7.3 MG/DL (ref 8.6–10.5)
CALCIUM SPEC-SCNC: 7.6 MG/DL (ref 8.6–10.5)
CALCIUM SPEC-SCNC: 7.6 MG/DL (ref 8.6–10.5)
CALCIUM SPEC-SCNC: 7.7 MG/DL (ref 8.6–10.5)
CALCIUM SPEC-SCNC: 9.5 MG/DL (ref 8.6–10.5)
CHLORIDE BLDA-SCNC: 105 MMOL/L (ref 98–109)
CHLORIDE SERPL-SCNC: 100 MMOL/L (ref 98–107)
CHLORIDE SERPL-SCNC: 103 MMOL/L (ref 98–107)
CHLORIDE SERPL-SCNC: 105 MMOL/L (ref 98–107)
CHLORIDE SERPL-SCNC: 106 MMOL/L (ref 98–107)
CHLORIDE SERPL-SCNC: 113 MMOL/L (ref 98–107)
CHLORIDE SERPL-SCNC: 117 MMOL/L (ref 98–107)
CHOLEST SERPL-MCNC: 45 MG/DL (ref 0–200)
CK MB SERPL-CCNC: 181.5 NG/ML
CK SERPL-CCNC: 4179 U/L (ref 20–180)
CO2 BLDA-SCNC: 10.5 MMOL/L (ref 22–33)
CO2 BLDA-SCNC: 11.5 MMOL/L (ref 22–33)
CO2 BLDA-SCNC: 12.1 MMOL/L (ref 22–33)
CO2 BLDA-SCNC: 13.6 MMOL/L (ref 22–33)
CO2 BLDA-SCNC: 15.4 MMOL/L (ref 22–33)
CO2 BLDA-SCNC: 20 MMOL/L (ref 24–29)
CO2 SERPL-SCNC: 10 MMOL/L (ref 22–29)
CO2 SERPL-SCNC: 13 MMOL/L (ref 22–29)
CO2 SERPL-SCNC: 15 MMOL/L (ref 22–29)
CO2 SERPL-SCNC: 17 MMOL/L (ref 22–29)
CO2 SERPL-SCNC: 26 MMOL/L (ref 22–29)
CO2 SERPL-SCNC: 7 MMOL/L (ref 22–29)
COHGB MFR BLD: 0.4 % (ref 0–2)
COHGB MFR BLD: 0.5 % (ref 0–2)
COHGB MFR BLD: 0.6 % (ref 0–2)
CREAT BLDA-MCNC: 1.5 MG/DL (ref 0.6–1.3)
CREAT SERPL-MCNC: 1.02 MG/DL (ref 0.57–1)
CREAT SERPL-MCNC: 1.68 MG/DL (ref 0.57–1)
CREAT SERPL-MCNC: 1.79 MG/DL (ref 0.57–1)
CREAT SERPL-MCNC: 1.82 MG/DL (ref 0.57–1)
CREAT SERPL-MCNC: 1.98 MG/DL (ref 0.57–1)
CREAT SERPL-MCNC: 2.65 MG/DL (ref 0.57–1)
D-LACTATE SERPL-SCNC: 15.4 MMOL/L (ref 0.5–2)
D-LACTATE SERPL-SCNC: 20 MMOL/L (ref 0.5–2)
D-LACTATE SERPL-SCNC: 21.7 MMOL/L (ref 0.5–2)
D-LACTATE SERPL-SCNC: 22.3 MMOL/L (ref 0.5–2)
D-LACTATE SERPL-SCNC: 23.1 MMOL/L (ref 0.5–2)
D-LACTATE SERPL-SCNC: 24.1 MMOL/L (ref 0.5–2)
DEPRECATED RDW RBC AUTO: 38.5 FL (ref 37–54)
DEPRECATED RDW RBC AUTO: 39.7 FL (ref 37–54)
DEPRECATED RDW RBC AUTO: 41.7 FL (ref 37–54)
DEPRECATED RDW RBC AUTO: 42.3 FL (ref 37–54)
DEPRECATED RDW RBC AUTO: 44.1 FL (ref 37–54)
DIAGNOSTIC IMP SPEC-IMP: NORMAL
EGFRCR SERPLBLD CKD-EPI 2021: 18.7 ML/MIN/1.73
EGFRCR SERPLBLD CKD-EPI 2021: 26.6 ML/MIN/1.73
EGFRCR SERPLBLD CKD-EPI 2021: 29.4 ML/MIN/1.73
EGFRCR SERPLBLD CKD-EPI 2021: 30 ML/MIN/1.73
EGFRCR SERPLBLD CKD-EPI 2021: 32.4 ML/MIN/1.73
EGFRCR SERPLBLD CKD-EPI 2021: 37.1 ML/MIN/1.73
EGFRCR SERPLBLD CKD-EPI 2021: 58.9 ML/MIN/1.73
EOSINOPHIL # BLD AUTO: 0.01 10*3/MM3 (ref 0–0.4)
EOSINOPHIL # BLD AUTO: 0.01 10*3/MM3 (ref 0–0.4)
EOSINOPHIL # BLD AUTO: 0.02 10*3/MM3 (ref 0–0.4)
EOSINOPHIL # BLD AUTO: 0.03 10*3/MM3 (ref 0–0.4)
EOSINOPHIL # BLD AUTO: 0.2 10*3/MM3 (ref 0–0.4)
EOSINOPHIL NFR BLD AUTO: 0.1 % (ref 0.3–6.2)
EOSINOPHIL NFR BLD AUTO: 0.2 % (ref 0.3–6.2)
EOSINOPHIL NFR BLD AUTO: 2.5 % (ref 0.3–6.2)
EPAP: 0
ERYTHROCYTE [DISTWIDTH] IN BLOOD BY AUTOMATED COUNT: 12.9 % (ref 12.3–15.4)
ERYTHROCYTE [DISTWIDTH] IN BLOOD BY AUTOMATED COUNT: 13 % (ref 12.3–15.4)
ERYTHROCYTE [DISTWIDTH] IN BLOOD BY AUTOMATED COUNT: 13.1 % (ref 12.3–15.4)
ERYTHROCYTE [DISTWIDTH] IN BLOOD BY AUTOMATED COUNT: 13.3 % (ref 12.3–15.4)
ERYTHROCYTE [DISTWIDTH] IN BLOOD BY AUTOMATED COUNT: 13.7 % (ref 12.3–15.4)
GEN 5 1HR TROPONIN T REFLEX: ABNORMAL NG/L
GLOBULIN UR ELPH-MCNC: 1.1 GM/DL
GLOBULIN UR ELPH-MCNC: 1.5 GM/DL
GLOBULIN UR ELPH-MCNC: 2.5 GM/DL
GLOBULIN UR ELPH-MCNC: 2.5 GM/DL
GLOBULIN UR ELPH-MCNC: 3 GM/DL
GLUCOSE BLDC GLUCOMTR-MCNC: 124 MG/DL (ref 70–130)
GLUCOSE BLDC GLUCOMTR-MCNC: 150 MG/DL (ref 70–130)
GLUCOSE BLDC GLUCOMTR-MCNC: 161 MG/DL (ref 70–130)
GLUCOSE BLDC GLUCOMTR-MCNC: 201 MG/DL (ref 70–130)
GLUCOSE BLDC GLUCOMTR-MCNC: 241 MG/DL (ref 70–130)
GLUCOSE BLDC GLUCOMTR-MCNC: 250 MG/DL (ref 70–130)
GLUCOSE BLDC GLUCOMTR-MCNC: 276 MG/DL (ref 70–130)
GLUCOSE BLDC GLUCOMTR-MCNC: 283 MG/DL (ref 70–130)
GLUCOSE BLDC GLUCOMTR-MCNC: 283 MG/DL (ref 70–130)
GLUCOSE BLDC GLUCOMTR-MCNC: 297 MG/DL (ref 70–130)
GLUCOSE BLDC GLUCOMTR-MCNC: 333 MG/DL (ref 70–130)
GLUCOSE BLDC GLUCOMTR-MCNC: 335 MG/DL (ref 70–130)
GLUCOSE BLDC GLUCOMTR-MCNC: 357 MG/DL (ref 70–130)
GLUCOSE BLDC GLUCOMTR-MCNC: 360 MG/DL (ref 70–130)
GLUCOSE BLDC GLUCOMTR-MCNC: 369 MG/DL (ref 70–130)
GLUCOSE BLDC GLUCOMTR-MCNC: 394 MG/DL (ref 70–130)
GLUCOSE BLDC GLUCOMTR-MCNC: 402 MG/DL (ref 70–130)
GLUCOSE BLDC GLUCOMTR-MCNC: 414 MG/DL (ref 70–130)
GLUCOSE BLDC GLUCOMTR-MCNC: 66 MG/DL (ref 70–130)
GLUCOSE BLDC GLUCOMTR-MCNC: 89 MG/DL (ref 70–130)
GLUCOSE SERPL-MCNC: 223 MG/DL (ref 65–99)
GLUCOSE SERPL-MCNC: 283 MG/DL (ref 65–99)
GLUCOSE SERPL-MCNC: 287 MG/DL (ref 65–99)
GLUCOSE SERPL-MCNC: 394 MG/DL (ref 65–99)
GLUCOSE SERPL-MCNC: 395 MG/DL (ref 65–99)
GLUCOSE SERPL-MCNC: 92 MG/DL (ref 65–99)
HCO3 BLDA-SCNC: 10.7 MMOL/L (ref 20–26)
HCO3 BLDA-SCNC: 11.2 MMOL/L (ref 20–26)
HCO3 BLDA-SCNC: 12.3 MMOL/L (ref 20–26)
HCO3 BLDA-SCNC: 14.4 MMOL/L (ref 20–26)
HCO3 BLDA-SCNC: 9.7 MMOL/L (ref 20–26)
HCT VFR BLD AUTO: 21.6 % (ref 34–46.6)
HCT VFR BLD AUTO: 27.5 % (ref 34–46.6)
HCT VFR BLD AUTO: 33.3 % (ref 34–46.6)
HCT VFR BLD AUTO: 33.9 % (ref 34–46.6)
HCT VFR BLD AUTO: 46 % (ref 34–46.6)
HCT VFR BLD CALC: 27.3 % (ref 38–51)
HCT VFR BLD CALC: 29.1 % (ref 38–51)
HCT VFR BLD CALC: 34.1 % (ref 38–51)
HCT VFR BLD CALC: 35.1 % (ref 38–51)
HCT VFR BLD CALC: 41.2 % (ref 38–51)
HCT VFR BLDA CALC: 46 % (ref 38–51)
HCV AB SERPL QL IA: REACTIVE
HCV RNA SERPL NAA+PROBE-ACNC: NORMAL IU/ML
HDLC SERPL-MCNC: 15 MG/DL (ref 40–60)
HGB BLD-MCNC: 11.1 G/DL (ref 12–15.9)
HGB BLD-MCNC: 11.6 G/DL (ref 12–15.9)
HGB BLD-MCNC: 14.6 G/DL (ref 12–15.9)
HGB BLD-MCNC: 7 G/DL (ref 12–15.9)
HGB BLD-MCNC: 8.9 G/DL (ref 12–15.9)
HGB BLDA-MCNC: 11.1 G/DL (ref 14–18)
HGB BLDA-MCNC: 11.5 G/DL (ref 14–18)
HGB BLDA-MCNC: 13.4 G/DL (ref 14–18)
HGB BLDA-MCNC: 15.6 G/DL (ref 12–17)
HGB BLDA-MCNC: 8.9 G/DL (ref 14–18)
HGB BLDA-MCNC: 9.5 G/DL (ref 14–18)
HOLD SPECIMEN: NORMAL
IMM GRANULOCYTES # BLD AUTO: 0.18 10*3/MM3 (ref 0–0.05)
IMM GRANULOCYTES # BLD AUTO: 0.19 10*3/MM3 (ref 0–0.05)
IMM GRANULOCYTES # BLD AUTO: 0.24 10*3/MM3 (ref 0–0.05)
IMM GRANULOCYTES # BLD AUTO: 0.25 10*3/MM3 (ref 0–0.05)
IMM GRANULOCYTES # BLD AUTO: 0.26 10*3/MM3 (ref 0–0.05)
IMM GRANULOCYTES NFR BLD AUTO: 0.9 % (ref 0–0.5)
IMM GRANULOCYTES NFR BLD AUTO: 1.2 % (ref 0–0.5)
IMM GRANULOCYTES NFR BLD AUTO: 1.3 % (ref 0–0.5)
IMM GRANULOCYTES NFR BLD AUTO: 1.4 % (ref 0–0.5)
IMM GRANULOCYTES NFR BLD AUTO: 3.2 % (ref 0–0.5)
INHALED O2 CONCENTRATION: 100 %
INHALED O2 CONCENTRATION: 100 %
INHALED O2 CONCENTRATION: 40 %
INHALED O2 CONCENTRATION: 40 %
INHALED O2 CONCENTRATION: 90 %
INR PPP: 1.13 (ref 0.89–1.12)
INR PPP: 1.48 (ref 0.89–1.12)
IPAP: 0
LDLC SERPL CALC-MCNC: 6 MG/DL (ref 0–100)
LDLC/HDLC SERPL: 0.2 {RATIO}
LYMPHOCYTES # BLD AUTO: 1.78 10*3/MM3 (ref 0.7–3.1)
LYMPHOCYTES # BLD AUTO: 1.85 10*3/MM3 (ref 0.7–3.1)
LYMPHOCYTES # BLD AUTO: 1.9 10*3/MM3 (ref 0.7–3.1)
LYMPHOCYTES # BLD AUTO: 2.85 10*3/MM3 (ref 0.7–3.1)
LYMPHOCYTES # BLD AUTO: 4.24 10*3/MM3 (ref 0.7–3.1)
LYMPHOCYTES NFR BLD AUTO: 10.2 % (ref 19.6–45.3)
LYMPHOCYTES NFR BLD AUTO: 11.8 % (ref 19.6–45.3)
LYMPHOCYTES NFR BLD AUTO: 15.6 % (ref 19.6–45.3)
LYMPHOCYTES NFR BLD AUTO: 52.7 % (ref 19.6–45.3)
LYMPHOCYTES NFR BLD AUTO: 9.1 % (ref 19.6–45.3)
Lab: ABNORMAL
MAGNESIUM SERPL-MCNC: 1.9 MG/DL (ref 1.6–2.4)
MAGNESIUM SERPL-MCNC: 2 MG/DL (ref 1.6–2.4)
MAGNESIUM SERPL-MCNC: 2.4 MG/DL (ref 1.6–2.4)
MAGNESIUM SERPL-MCNC: 2.5 MG/DL (ref 1.6–2.4)
MAGNESIUM SERPL-MCNC: 2.7 MG/DL (ref 1.6–2.4)
MCH RBC QN AUTO: 27.8 PG (ref 26.6–33)
MCH RBC QN AUTO: 28 PG (ref 26.6–33)
MCH RBC QN AUTO: 28.1 PG (ref 26.6–33)
MCH RBC QN AUTO: 28.1 PG (ref 26.6–33)
MCH RBC QN AUTO: 28.6 PG (ref 26.6–33)
MCHC RBC AUTO-ENTMCNC: 31.7 G/DL (ref 31.5–35.7)
MCHC RBC AUTO-ENTMCNC: 32.4 G/DL (ref 31.5–35.7)
MCHC RBC AUTO-ENTMCNC: 32.4 G/DL (ref 31.5–35.7)
MCHC RBC AUTO-ENTMCNC: 33.3 G/DL (ref 31.5–35.7)
MCHC RBC AUTO-ENTMCNC: 34.2 G/DL (ref 31.5–35.7)
MCV RBC AUTO: 81.7 FL (ref 79–97)
MCV RBC AUTO: 84.3 FL (ref 79–97)
MCV RBC AUTO: 86.8 FL (ref 79–97)
MCV RBC AUTO: 87.5 FL (ref 79–97)
MCV RBC AUTO: 88.2 FL (ref 79–97)
METHGB BLD QL: 0.3 % (ref 0–1.5)
METHGB BLD QL: 0.5 % (ref 0–1.5)
METHGB BLD QL: 0.7 % (ref 0–1.5)
METHGB BLD QL: 0.7 % (ref 0–1.5)
METHGB BLD QL: 0.8 % (ref 0–1.5)
MODALITY: ABNORMAL
MONOCYTES # BLD AUTO: 0.38 10*3/MM3 (ref 0.1–0.9)
MONOCYTES # BLD AUTO: 0.96 10*3/MM3 (ref 0.1–0.9)
MONOCYTES # BLD AUTO: 1.16 10*3/MM3 (ref 0.1–0.9)
MONOCYTES # BLD AUTO: 1.46 10*3/MM3 (ref 0.1–0.9)
MONOCYTES # BLD AUTO: 1.46 10*3/MM3 (ref 0.1–0.9)
MONOCYTES NFR BLD AUTO: 4.7 % (ref 5–12)
MONOCYTES NFR BLD AUTO: 5.1 % (ref 5–12)
MONOCYTES NFR BLD AUTO: 7.4 % (ref 5–12)
MONOCYTES NFR BLD AUTO: 7.5 % (ref 5–12)
MONOCYTES NFR BLD AUTO: 8 % (ref 5–12)
NEUTROPHILS NFR BLD AUTO: 12.48 10*3/MM3 (ref 1.7–7)
NEUTROPHILS NFR BLD AUTO: 13.73 10*3/MM3 (ref 1.7–7)
NEUTROPHILS NFR BLD AUTO: 15.57 10*3/MM3 (ref 1.7–7)
NEUTROPHILS NFR BLD AUTO: 16 10*3/MM3 (ref 1.7–7)
NEUTROPHILS NFR BLD AUTO: 2.92 10*3/MM3 (ref 1.7–7)
NEUTROPHILS NFR BLD AUTO: 36.3 % (ref 42.7–76)
NEUTROPHILS NFR BLD AUTO: 74.8 % (ref 42.7–76)
NEUTROPHILS NFR BLD AUTO: 79.4 % (ref 42.7–76)
NEUTROPHILS NFR BLD AUTO: 82.2 % (ref 42.7–76)
NEUTROPHILS NFR BLD AUTO: 83.2 % (ref 42.7–76)
NOTIFIED BY: ABNORMAL
NOTIFIED WHO: ABNORMAL
NRBC BLD AUTO-RTO: 0 /100 WBC (ref 0–0.2)
NRBC BLD AUTO-RTO: 0.1 /100 WBC (ref 0–0.2)
NRBC BLD AUTO-RTO: 0.1 /100 WBC (ref 0–0.2)
NRBC BLD AUTO-RTO: 0.2 /100 WBC (ref 0–0.2)
NRBC BLD AUTO-RTO: 1.2 /100 WBC (ref 0–0.2)
OXYHGB MFR BLDV: 90.5 % (ref 94–99)
OXYHGB MFR BLDV: 91 % (ref 94–99)
OXYHGB MFR BLDV: 93.8 % (ref 94–99)
OXYHGB MFR BLDV: 97.8 % (ref 94–99)
OXYHGB MFR BLDV: 97.9 % (ref 94–99)
PAW @ PEAK INSP FLOW SETTING VENT: 0 CMH2O
PCO2 BLDA: 24.9 MM HG (ref 35–45)
PCO2 BLDA: 27.5 MM HG (ref 35–45)
PCO2 BLDA: 29.3 MM HG (ref 35–45)
PCO2 BLDA: 30.8 MM HG (ref 35–45)
PCO2 BLDA: 40.4 MM HG (ref 35–45)
PCO2 TEMP ADJ BLD: 24.9 MM HG (ref 35–45)
PCO2 TEMP ADJ BLD: 27.5 MM HG (ref 35–45)
PCO2 TEMP ADJ BLD: 29.3 MM HG (ref 35–45)
PCO2 TEMP ADJ BLD: 30.8 MM HG (ref 35–45)
PCO2 TEMP ADJ BLD: 40.4 MM HG (ref 35–45)
PEEP RESPIRATORY: 18 CM[H2O]
PEEP RESPIRATORY: 5 CM[H2O]
PH BLDA: 7.09 PH UNITS (ref 7.35–7.45)
PH BLDA: 7.19 PH UNITS (ref 7.35–7.45)
PH BLDA: 7.2 PH UNITS (ref 7.35–7.45)
PH BLDA: 7.2 PH UNITS (ref 7.35–7.45)
PH BLDA: 7.28 PH UNITS (ref 7.35–7.45)
PH, TEMP CORRECTED: 7.09 PH UNITS
PH, TEMP CORRECTED: 7.19 PH UNITS
PH, TEMP CORRECTED: 7.2 PH UNITS
PH, TEMP CORRECTED: 7.2 PH UNITS
PH, TEMP CORRECTED: 7.28 PH UNITS
PHOSPHATE SERPL-MCNC: 1 MG/DL (ref 2.5–4.5)
PHOSPHATE SERPL-MCNC: 3.3 MG/DL (ref 2.5–4.5)
PHOSPHATE SERPL-MCNC: 3.9 MG/DL (ref 2.5–4.5)
PHOSPHATE SERPL-MCNC: 4.4 MG/DL (ref 2.5–4.5)
PLATELET # BLD AUTO: 135 10*3/MM3 (ref 140–450)
PLATELET # BLD AUTO: 177 10*3/MM3 (ref 140–450)
PLATELET # BLD AUTO: 207 10*3/MM3 (ref 140–450)
PLATELET # BLD AUTO: 217 10*3/MM3 (ref 140–450)
PLATELET # BLD AUTO: 82 10*3/MM3 (ref 140–450)
PMV BLD AUTO: 10.1 FL (ref 6–12)
PMV BLD AUTO: 8.9 FL (ref 6–12)
PMV BLD AUTO: 9.3 FL (ref 6–12)
PMV BLD AUTO: 9.4 FL (ref 6–12)
PMV BLD AUTO: 9.5 FL (ref 6–12)
PO2 BLDA: 143 MM HG (ref 83–108)
PO2 BLDA: 234 MM HG (ref 83–108)
PO2 BLDA: 72.4 MM HG (ref 83–108)
PO2 BLDA: 74.4 MM HG (ref 83–108)
PO2 BLDA: 79.7 MM HG (ref 83–108)
PO2 TEMP ADJ BLD: 143 MM HG (ref 83–108)
PO2 TEMP ADJ BLD: 234 MM HG (ref 83–108)
PO2 TEMP ADJ BLD: 72.4 MM HG (ref 83–108)
PO2 TEMP ADJ BLD: 74.4 MM HG (ref 83–108)
PO2 TEMP ADJ BLD: 79.7 MM HG (ref 83–108)
POTASSIUM BLDA-SCNC: 3.3 MMOL/L (ref 3.5–4.9)
POTASSIUM SERPL-SCNC: 2.4 MMOL/L (ref 3.5–5.2)
POTASSIUM SERPL-SCNC: 2.7 MMOL/L (ref 3.5–5.2)
POTASSIUM SERPL-SCNC: 2.7 MMOL/L (ref 3.5–5.2)
POTASSIUM SERPL-SCNC: 3.4 MMOL/L (ref 3.5–5.2)
POTASSIUM SERPL-SCNC: 4.4 MMOL/L (ref 3.5–5.2)
POTASSIUM SERPL-SCNC: 4.6 MMOL/L (ref 3.5–5.2)
POTASSIUM SERPL-SCNC: 5 MMOL/L (ref 3.5–5.2)
PROCALCITONIN SERPL-MCNC: 0.04 NG/ML (ref 0–0.25)
PROT SERPL-MCNC: 4.1 G/DL (ref 6–8.5)
PROT SERPL-MCNC: 4.6 G/DL (ref 6–8.5)
PROT SERPL-MCNC: 5.6 G/DL (ref 6–8.5)
PROT SERPL-MCNC: 5.7 G/DL (ref 6–8.5)
PROT SERPL-MCNC: 7.2 G/DL (ref 6–8.5)
PROTHROMBIN TIME: 15.2 SECONDS (ref 12.2–15.3)
PROTHROMBIN TIME: 18.9 SECONDS (ref 12.2–15.3)
QT INTERVAL: 276 MS
QT INTERVAL: 336 MS
QT INTERVAL: 454 MS
QTC INTERVAL: 427 MS
QTC INTERVAL: 486 MS
QTC INTERVAL: 561 MS
RBC # BLD AUTO: 2.45 10*6/MM3 (ref 3.77–5.28)
RBC # BLD AUTO: 3.17 10*6/MM3 (ref 3.77–5.28)
RBC # BLD AUTO: 3.95 10*6/MM3 (ref 3.77–5.28)
RBC # BLD AUTO: 4.15 10*6/MM3 (ref 3.77–5.28)
RBC # BLD AUTO: 5.26 10*6/MM3 (ref 3.77–5.28)
REF LAB TEST REF RANGE: NORMAL
SET MECH RESP RATE: 18
SODIUM BLD-SCNC: 140 MMOL/L (ref 138–146)
SODIUM SERPL-SCNC: 139 MMOL/L (ref 136–145)
SODIUM SERPL-SCNC: 144 MMOL/L (ref 136–145)
SODIUM SERPL-SCNC: 152 MMOL/L (ref 136–145)
SODIUM SERPL-SCNC: 152 MMOL/L (ref 136–145)
SODIUM SERPL-SCNC: 156 MMOL/L (ref 136–145)
SODIUM SERPL-SCNC: 160 MMOL/L (ref 136–145)
TOTAL RATE: 14 BREATHS/MINUTE
TOTAL RATE: 14 BREATHS/MINUTE
TOTAL RATE: 18 BREATHS/MINUTE
TOTAL RATE: 22 BREATHS/MINUTE
TOTAL RATE: 24 BREATHS/MINUTE
TRIGL SERPL-MCNC: 135 MG/DL (ref 0–150)
TROPONIN T % DELTA: >90
TROPONIN T NUMERIC DELTA: ABNORMAL
TROPONIN T SERPL HS-MCNC: 5252 NG/L
UFH PPP CHRO-ACNC: 0.1 IU/ML (ref 0.3–0.7)
UFH PPP CHRO-ACNC: 0.17 IU/ML (ref 0.3–0.7)
UFH PPP CHRO-ACNC: 0.59 IU/ML (ref 0.3–0.7)
UFH PPP CHRO-ACNC: 0.69 IU/ML (ref 0.3–0.7)
VENTILATOR MODE: ABNORMAL
VLDLC SERPL-MCNC: 24 MG/DL (ref 5–40)
VT ON VENT VENT: 0.34 ML
VT ON VENT VENT: 0.34 ML
VT ON VENT VENT: 0.42 ML
VT ON VENT VENT: 0.42 ML
VT ON VENT VENT: 360 ML
WBC NRBC COR # BLD AUTO: 15.71 10*3/MM3 (ref 3.4–10.8)
WBC NRBC COR # BLD AUTO: 18.32 10*3/MM3 (ref 3.4–10.8)
WBC NRBC COR # BLD AUTO: 18.71 10*3/MM3 (ref 3.4–10.8)
WBC NRBC COR # BLD AUTO: 19.47 10*3/MM3 (ref 3.4–10.8)
WBC NRBC COR # BLD AUTO: 8.05 10*3/MM3 (ref 3.4–10.8)
WHOLE BLOOD HOLD COAG: NORMAL
WHOLE BLOOD HOLD COAG: NORMAL
WHOLE BLOOD HOLD SPECIMEN: NORMAL
WHOLE BLOOD HOLD SPECIMEN: NORMAL

## 2025-01-01 PROCEDURE — 92950 HEART/LUNG RESUSCITATION CPR: CPT

## 2025-01-01 PROCEDURE — 80047 BASIC METABLC PNL IONIZED CA: CPT

## 2025-01-01 PROCEDURE — 99232 SBSQ HOSP IP/OBS MODERATE 35: CPT

## 2025-01-01 PROCEDURE — 83050 HGB METHEMOGLOBIN QUAN: CPT

## 2025-01-01 PROCEDURE — 80053 COMPREHEN METABOLIC PANEL: CPT | Performed by: INTERNAL MEDICINE

## 2025-01-01 PROCEDURE — 80061 LIPID PANEL: CPT | Performed by: INTERNAL MEDICINE

## 2025-01-01 PROCEDURE — 25010000002 HEPARIN (PORCINE) PER 1000 UNITS: Performed by: INTERNAL MEDICINE

## 2025-01-01 PROCEDURE — 82375 ASSAY CARBOXYHB QUANT: CPT

## 2025-01-01 PROCEDURE — 85520 HEPARIN ASSAY: CPT | Performed by: INTERNAL MEDICINE

## 2025-01-01 PROCEDURE — 70450 CT HEAD/BRAIN W/O DYE: CPT

## 2025-01-01 PROCEDURE — 82948 REAGENT STRIP/BLOOD GLUCOSE: CPT

## 2025-01-01 PROCEDURE — 25010000002 HYDROCORTISONE SOD SUC (PF) 250 MG RECONSTITUTED SOLUTION: Performed by: INTERNAL MEDICINE

## 2025-01-01 PROCEDURE — 82805 BLOOD GASES W/O2 SATURATION: CPT

## 2025-01-01 PROCEDURE — 93458 L HRT ARTERY/VENTRICLE ANGIO: CPT | Performed by: INTERNAL MEDICINE

## 2025-01-01 PROCEDURE — 25810000003 SODIUM CHLORIDE 0.9 % SOLUTION 250 ML FLEX CONT: Performed by: INTERNAL MEDICINE

## 2025-01-01 PROCEDURE — 25010000002 PROPOFOL 1000 MG/ML EMULSION: Performed by: INTERNAL MEDICINE

## 2025-01-01 PROCEDURE — 71045 X-RAY EXAM CHEST 1 VIEW: CPT

## 2025-01-01 PROCEDURE — 36600 WITHDRAWAL OF ARTERIAL BLOOD: CPT

## 2025-01-01 PROCEDURE — 02HV33Z INSERTION OF INFUSION DEVICE INTO SUPERIOR VENA CAVA, PERCUTANEOUS APPROACH: ICD-10-PCS | Performed by: INTERNAL MEDICINE

## 2025-01-01 PROCEDURE — 94003 VENT MGMT INPAT SUBQ DAY: CPT

## 2025-01-01 PROCEDURE — C1874 STENT, COATED/COV W/DEL SYS: HCPCS | Performed by: INTERNAL MEDICINE

## 2025-01-01 PROCEDURE — 83605 ASSAY OF LACTIC ACID: CPT | Performed by: INTERNAL MEDICINE

## 2025-01-01 PROCEDURE — 85520 HEPARIN ASSAY: CPT

## 2025-01-01 PROCEDURE — 92978 ENDOLUMINL IVUS OCT C 1ST: CPT | Performed by: INTERNAL MEDICINE

## 2025-01-01 PROCEDURE — 36556 INSERT NON-TUNNEL CV CATH: CPT | Performed by: INTERNAL MEDICINE

## 2025-01-01 PROCEDURE — C9606 PERC D-E COR REVASC W AMI S: HCPCS | Performed by: INTERNAL MEDICINE

## 2025-01-01 PROCEDURE — 25010000002 ALBUMIN HUMAN 25% PER 50 ML: Performed by: INTERNAL MEDICINE

## 2025-01-01 PROCEDURE — 85520 HEPARIN ASSAY: CPT | Performed by: EMERGENCY MEDICINE

## 2025-01-01 PROCEDURE — 93005 ELECTROCARDIOGRAM TRACING: CPT | Performed by: EMERGENCY MEDICINE

## 2025-01-01 PROCEDURE — 25010000002 HYDROCORTISONE SOD SUC (PF) 100 MG RECONSTITUTED SOLUTION: Performed by: INTERNAL MEDICINE

## 2025-01-01 PROCEDURE — 25810000003 SODIUM CHLORIDE 0.9 % SOLUTION: Performed by: INTERNAL MEDICINE

## 2025-01-01 PROCEDURE — 25010000002 PIPERACILLIN SOD-TAZOBACTAM PER 1 G: Performed by: INTERNAL MEDICINE

## 2025-01-01 PROCEDURE — 25010000002 POTASSIUM CHLORIDE PER 2 MEQ: Performed by: INTERNAL MEDICINE

## 2025-01-01 PROCEDURE — 25010000003 DEXTROSE 5 % SOLUTION: Performed by: INTERNAL MEDICINE

## 2025-01-01 PROCEDURE — C1894 INTRO/SHEATH, NON-LASER: HCPCS | Performed by: INTERNAL MEDICINE

## 2025-01-01 PROCEDURE — 25010000002 HEPARIN (PORCINE) 25000-0.45 UT/250ML-% SOLUTION

## 2025-01-01 PROCEDURE — 85610 PROTHROMBIN TIME: CPT | Performed by: EMERGENCY MEDICINE

## 2025-01-01 PROCEDURE — 83735 ASSAY OF MAGNESIUM: CPT | Performed by: EMERGENCY MEDICINE

## 2025-01-01 PROCEDURE — 85025 COMPLETE CBC W/AUTO DIFF WBC: CPT | Performed by: INTERNAL MEDICINE

## 2025-01-01 PROCEDURE — 85730 THROMBOPLASTIN TIME PARTIAL: CPT | Performed by: EMERGENCY MEDICINE

## 2025-01-01 PROCEDURE — C1769 GUIDE WIRE: HCPCS | Performed by: INTERNAL MEDICINE

## 2025-01-01 PROCEDURE — 84100 ASSAY OF PHOSPHORUS: CPT | Performed by: INTERNAL MEDICINE

## 2025-01-01 PROCEDURE — 94799 UNLISTED PULMONARY SVC/PX: CPT

## 2025-01-01 PROCEDURE — 5A02210 ASSISTANCE WITH CARDIAC OUTPUT USING BALLOON PUMP, CONTINUOUS: ICD-10-PCS | Performed by: INTERNAL MEDICINE

## 2025-01-01 PROCEDURE — 25010000002 LIDOCAINE PF 1% 1 % SOLUTION: Performed by: INTERNAL MEDICINE

## 2025-01-01 PROCEDURE — 82330 ASSAY OF CALCIUM: CPT | Performed by: INTERNAL MEDICINE

## 2025-01-01 PROCEDURE — 85347 COAGULATION TIME ACTIVATED: CPT

## 2025-01-01 PROCEDURE — 99497 ADVNCD CARE PLAN 30 MIN: CPT | Performed by: NURSE PRACTITIONER

## 2025-01-01 PROCEDURE — 84484 ASSAY OF TROPONIN QUANT: CPT | Performed by: EMERGENCY MEDICINE

## 2025-01-01 PROCEDURE — 25010000002 MAGNESIUM SULFATE 2 GM/50ML SOLUTION: Performed by: INTERNAL MEDICINE

## 2025-01-01 PROCEDURE — 85730 THROMBOPLASTIN TIME PARTIAL: CPT | Performed by: INTERNAL MEDICINE

## 2025-01-01 PROCEDURE — 85025 COMPLETE CBC W/AUTO DIFF WBC: CPT | Performed by: EMERGENCY MEDICINE

## 2025-01-01 PROCEDURE — 33967 INSERT I-AORT PERCUT DEVICE: CPT | Performed by: INTERNAL MEDICINE

## 2025-01-01 PROCEDURE — 99291 CRITICAL CARE FIRST HOUR: CPT | Performed by: INTERNAL MEDICINE

## 2025-01-01 PROCEDURE — 93005 ELECTROCARDIOGRAM TRACING: CPT | Performed by: INTERNAL MEDICINE

## 2025-01-01 PROCEDURE — P9047 ALBUMIN (HUMAN), 25%, 50ML: HCPCS | Performed by: INTERNAL MEDICINE

## 2025-01-01 PROCEDURE — 25010000002 PHENYLEPHRINE 10 MG/ML SOLUTION 5 ML VIAL: Performed by: INTERNAL MEDICINE

## 2025-01-01 PROCEDURE — 25010000002 FENTANYL CITRATE (PF) 50 MCG/ML SOLUTION: Performed by: INTERNAL MEDICINE

## 2025-01-01 PROCEDURE — 82550 ASSAY OF CK (CPK): CPT | Performed by: INTERNAL MEDICINE

## 2025-01-01 PROCEDURE — 83735 ASSAY OF MAGNESIUM: CPT | Performed by: INTERNAL MEDICINE

## 2025-01-01 PROCEDURE — 80053 COMPREHEN METABOLIC PANEL: CPT

## 2025-01-01 PROCEDURE — 25010000002 EPTIFIBATIDE PER 5 MG: Performed by: INTERNAL MEDICINE

## 2025-01-01 PROCEDURE — C1725 CATH, TRANSLUMIN NON-LASER: HCPCS | Performed by: INTERNAL MEDICINE

## 2025-01-01 PROCEDURE — 93010 ELECTROCARDIOGRAM REPORT: CPT | Performed by: INTERNAL MEDICINE

## 2025-01-01 PROCEDURE — 25010000002 MIDAZOLAM PER 1 MG: Performed by: INTERNAL MEDICINE

## 2025-01-01 PROCEDURE — B240ZZ3 ULTRASONOGRAPHY OF SINGLE CORONARY ARTERY, INTRAVASCULAR: ICD-10-PCS | Performed by: INTERNAL MEDICINE

## 2025-01-01 PROCEDURE — 94002 VENT MGMT INPAT INIT DAY: CPT

## 2025-01-01 PROCEDURE — B2151ZZ FLUOROSCOPY OF LEFT HEART USING LOW OSMOLAR CONTRAST: ICD-10-PCS | Performed by: INTERNAL MEDICINE

## 2025-01-01 PROCEDURE — C1753 CATH, INTRAVAS ULTRASOUND: HCPCS | Performed by: INTERNAL MEDICINE

## 2025-01-01 PROCEDURE — 86803 HEPATITIS C AB TEST: CPT

## 2025-01-01 PROCEDURE — 25010000002 POTASSIUM CHLORIDE PER 2 MEQ: Performed by: NURSE PRACTITIONER

## 2025-01-01 PROCEDURE — 25510000001 IOPAMIDOL PER 1 ML: Performed by: INTERNAL MEDICINE

## 2025-01-01 PROCEDURE — 84145 PROCALCITONIN (PCT): CPT | Performed by: INTERNAL MEDICINE

## 2025-01-01 PROCEDURE — C1887 CATHETER, GUIDING: HCPCS | Performed by: INTERNAL MEDICINE

## 2025-01-01 PROCEDURE — 25010000002 DOBUTAMINE PER 250 MG: Performed by: INTERNAL MEDICINE

## 2025-01-01 PROCEDURE — 92941 PRQ TRLML REVSC TOT OCCL AMI: CPT | Performed by: INTERNAL MEDICINE

## 2025-01-01 PROCEDURE — 25010000002 PROPOFOL 10 MG/ML EMULSION: Performed by: INTERNAL MEDICINE

## 2025-01-01 PROCEDURE — 87522 HEPATITIS C REVRS TRNSCRPJ: CPT

## 2025-01-01 PROCEDURE — 25010000002 EPINEPHRINE 1 MG/ML SOLUTION: Performed by: INTERNAL MEDICINE

## 2025-01-01 PROCEDURE — 25010000002 HEPARIN (PORCINE) PER 1000 UNITS

## 2025-01-01 PROCEDURE — 85014 HEMATOCRIT: CPT

## 2025-01-01 PROCEDURE — 25010000002 EPTIFIBATIDE 20 MG/10ML SOLUTION: Performed by: INTERNAL MEDICINE

## 2025-01-01 PROCEDURE — 84132 ASSAY OF SERUM POTASSIUM: CPT | Performed by: INTERNAL MEDICINE

## 2025-01-01 PROCEDURE — 85610 PROTHROMBIN TIME: CPT | Performed by: INTERNAL MEDICINE

## 2025-01-01 PROCEDURE — 99223 1ST HOSP IP/OBS HIGH 75: CPT | Performed by: INTERNAL MEDICINE

## 2025-01-01 PROCEDURE — 99285 EMERGENCY DEPT VISIT HI MDM: CPT

## 2025-01-01 PROCEDURE — 4A023N7 MEASUREMENT OF CARDIAC SAMPLING AND PRESSURE, LEFT HEART, PERCUTANEOUS APPROACH: ICD-10-PCS | Performed by: INTERNAL MEDICINE

## 2025-01-01 PROCEDURE — 82553 CREATINE MB FRACTION: CPT | Performed by: INTERNAL MEDICINE

## 2025-01-01 PROCEDURE — 25010000002 FENTANYL 10 MCG/1 ML NS: Performed by: INTERNAL MEDICINE

## 2025-01-01 PROCEDURE — 25010000002 HEPARIN (PORCINE) 25000-0.45 UT/250ML-% SOLUTION: Performed by: INTERNAL MEDICINE

## 2025-01-01 PROCEDURE — 25810000003 SODIUM CHLORIDE 0.9 % SOLUTION: Performed by: EMERGENCY MEDICINE

## 2025-01-01 PROCEDURE — 25010000002 EPINEPHRINE 1 MG/10ML SOLUTION PREFILLED SYRINGE: Performed by: EMERGENCY MEDICINE

## 2025-01-01 PROCEDURE — 5A1945Z RESPIRATORY VENTILATION, 24-96 CONSECUTIVE HOURS: ICD-10-PCS | Performed by: INTERNAL MEDICINE

## 2025-01-01 PROCEDURE — C1751 CATH, INF, PER/CENT/MIDLINE: HCPCS

## 2025-01-01 PROCEDURE — 027035Z DILATION OF CORONARY ARTERY, ONE ARTERY WITH TWO DRUG-ELUTING INTRALUMINAL DEVICES, PERCUTANEOUS APPROACH: ICD-10-PCS | Performed by: INTERNAL MEDICINE

## 2025-01-01 PROCEDURE — B2111ZZ FLUOROSCOPY OF MULTIPLE CORONARY ARTERIES USING LOW OSMOLAR CONTRAST: ICD-10-PCS | Performed by: INTERNAL MEDICINE

## 2025-01-01 PROCEDURE — 25010000002 EPINEPHRINE 1 MG/ML SOLUTION 30 ML VIAL: Performed by: INTERNAL MEDICINE

## 2025-01-01 PROCEDURE — 84484 ASSAY OF TROPONIN QUANT: CPT | Performed by: INTERNAL MEDICINE

## 2025-01-01 DEVICE — XIENCE SKYPOINT™ EVEROLIMUS ELUTING CORONARY STENT SYSTEM 3.50 MM X 18 MM / RAPID-EXCHANGE
Type: IMPLANTABLE DEVICE | Site: CORONARY | Status: FUNCTIONAL
Brand: XIENCE SKYPOINT™

## 2025-01-01 RX ORDER — BISACODYL 10 MG
10 SUPPOSITORY, RECTAL RECTAL DAILY PRN
Status: DISCONTINUED | OUTPATIENT
Start: 2025-01-01 | End: 2025-01-01 | Stop reason: HOSPADM

## 2025-01-01 RX ORDER — HEPARIN SODIUM 10000 [USP'U]/100ML
12 INJECTION, SOLUTION INTRAVENOUS
Status: DISCONTINUED | OUTPATIENT
Start: 2025-01-01 | End: 2025-01-01

## 2025-01-01 RX ORDER — NICOTINE POLACRILEX 4 MG
15 LOZENGE BUCCAL
Status: DISCONTINUED | OUTPATIENT
Start: 2025-01-01 | End: 2025-01-01

## 2025-01-01 RX ORDER — SODIUM CHLORIDE 0.9 % (FLUSH) 0.9 %
10 SYRINGE (ML) INJECTION AS NEEDED
Status: DISCONTINUED | OUTPATIENT
Start: 2025-01-01 | End: 2025-01-01

## 2025-01-01 RX ORDER — POTASSIUM CHLORIDE 29.8 MG/ML
20 INJECTION INTRAVENOUS
Status: COMPLETED | OUTPATIENT
Start: 2025-01-01 | End: 2025-01-01

## 2025-01-01 RX ORDER — NOREPINEPHRINE BITARTRATE 1 MG/ML
INJECTION, SOLUTION INTRAVENOUS
Status: COMPLETED | OUTPATIENT
Start: 2025-01-01 | End: 2025-01-01

## 2025-01-01 RX ORDER — HEPARIN SODIUM 1000 [USP'U]/ML
5000 INJECTION, SOLUTION INTRAVENOUS; SUBCUTANEOUS ONCE
Status: COMPLETED | OUTPATIENT
Start: 2025-01-01 | End: 2025-01-01

## 2025-01-01 RX ORDER — HEPARIN SODIUM 1000 [USP'U]/ML
INJECTION, SOLUTION INTRAVENOUS; SUBCUTANEOUS
Status: COMPLETED
Start: 2025-01-01 | End: 2025-01-01

## 2025-01-01 RX ORDER — HEPARIN SODIUM 1000 [USP'U]/ML
4000 INJECTION, SOLUTION INTRAVENOUS; SUBCUTANEOUS ONCE
Status: DISCONTINUED | OUTPATIENT
Start: 2025-01-01 | End: 2025-01-01

## 2025-01-01 RX ORDER — HEPARIN SODIUM 1000 [USP'U]/ML
25 INJECTION, SOLUTION INTRAVENOUS; SUBCUTANEOUS AS NEEDED
Status: DISCONTINUED | OUTPATIENT
Start: 2025-01-01 | End: 2025-01-01

## 2025-01-01 RX ORDER — FENTANYL CITRATE 50 UG/ML
INJECTION, SOLUTION INTRAMUSCULAR; INTRAVENOUS
Status: DISCONTINUED | OUTPATIENT
Start: 2025-01-01 | End: 2025-01-01 | Stop reason: HOSPADM

## 2025-01-01 RX ORDER — ATORVASTATIN CALCIUM 10 MG/1
10 TABLET, FILM COATED ORAL NIGHTLY
Status: DISCONTINUED | OUTPATIENT
Start: 2025-01-01 | End: 2025-01-01

## 2025-01-01 RX ORDER — ALBUMIN (HUMAN) 12.5 G/50ML
25 SOLUTION INTRAVENOUS EVERY 6 HOURS
Status: DISCONTINUED | OUTPATIENT
Start: 2025-01-01 | End: 2025-01-01 | Stop reason: HOSPADM

## 2025-01-01 RX ORDER — IOPAMIDOL 755 MG/ML
INJECTION, SOLUTION INTRAVASCULAR
Status: DISCONTINUED | OUTPATIENT
Start: 2025-01-01 | End: 2025-01-01 | Stop reason: HOSPADM

## 2025-01-01 RX ORDER — INDOMETHACIN 25 MG/1
150 CAPSULE ORAL ONCE
Status: COMPLETED | OUTPATIENT
Start: 2025-01-01 | End: 2025-01-01

## 2025-01-01 RX ORDER — ACETAMINOPHEN 325 MG/1
650 TABLET ORAL EVERY 4 HOURS PRN
Status: DISCONTINUED | OUTPATIENT
Start: 2025-01-01 | End: 2025-01-01 | Stop reason: HOSPADM

## 2025-01-01 RX ORDER — NALOXONE HCL 0.4 MG/ML
0.4 VIAL (ML) INJECTION
Status: DISCONTINUED | OUTPATIENT
Start: 2025-01-01 | End: 2025-01-01 | Stop reason: HOSPADM

## 2025-01-01 RX ORDER — DEXTROSE MONOHYDRATE 25 G/50ML
10-50 INJECTION, SOLUTION INTRAVENOUS
Status: DISCONTINUED | OUTPATIENT
Start: 2025-01-01 | End: 2025-01-01 | Stop reason: HOSPADM

## 2025-01-01 RX ORDER — CLOPIDOGREL BISULFATE 75 MG/1
600 TABLET ORAL ONCE
Status: DISCONTINUED | OUTPATIENT
Start: 2025-01-01 | End: 2025-01-01

## 2025-01-01 RX ORDER — ATORVASTATIN CALCIUM 10 MG/1
10 TABLET, FILM COATED ORAL NIGHTLY
Status: DISCONTINUED | OUTPATIENT
Start: 2025-01-01 | End: 2025-01-01 | Stop reason: HOSPADM

## 2025-01-01 RX ORDER — HYDROCORTISONE SODIUM SUCCINATE 100 MG/2ML
100 INJECTION INTRAMUSCULAR; INTRAVENOUS EVERY 8 HOURS
Status: DISCONTINUED | OUTPATIENT
Start: 2025-01-01 | End: 2025-01-01 | Stop reason: HOSPADM

## 2025-01-01 RX ORDER — ACETAMINOPHEN 325 MG/1
650 TABLET ORAL EVERY 4 HOURS PRN
Status: DISCONTINUED | OUTPATIENT
Start: 2025-01-01 | End: 2025-01-01

## 2025-01-01 RX ORDER — NITROGLYCERIN 0.4 MG/1
0.4 TABLET SUBLINGUAL
Status: DISCONTINUED | OUTPATIENT
Start: 2025-01-01 | End: 2025-01-01 | Stop reason: HOSPADM

## 2025-01-01 RX ORDER — SODIUM CHLORIDE 0.9 % (FLUSH) 0.9 %
10 SYRINGE (ML) INJECTION EVERY 12 HOURS SCHEDULED
Status: DISCONTINUED | OUTPATIENT
Start: 2025-01-01 | End: 2025-01-01

## 2025-01-01 RX ORDER — SODIUM CHLORIDE 9 MG/ML
40 INJECTION, SOLUTION INTRAVENOUS AS NEEDED
Status: DISCONTINUED | OUTPATIENT
Start: 2025-01-01 | End: 2025-01-01 | Stop reason: HOSPADM

## 2025-01-01 RX ORDER — AMOXICILLIN 250 MG
2 CAPSULE ORAL 2 TIMES DAILY
Status: DISCONTINUED | OUTPATIENT
Start: 2025-01-01 | End: 2025-01-01 | Stop reason: HOSPADM

## 2025-01-01 RX ORDER — DOBUTAMINE HYDROCHLORIDE 100 MG/100ML
2-20 INJECTION INTRAVENOUS CONTINUOUS
Status: DISCONTINUED | OUTPATIENT
Start: 2025-01-01 | End: 2025-01-01 | Stop reason: HOSPADM

## 2025-01-01 RX ORDER — SODIUM CHLORIDE 0.9 % (FLUSH) 0.9 %
10 SYRINGE (ML) INJECTION AS NEEDED
Status: DISCONTINUED | OUTPATIENT
Start: 2025-01-01 | End: 2025-01-01 | Stop reason: HOSPADM

## 2025-01-01 RX ORDER — HYDROCODONE BITARTRATE AND ACETAMINOPHEN 7.5; 325 MG/1; MG/1
1 TABLET ORAL EVERY 4 HOURS PRN
Refills: 0 | Status: DISCONTINUED | OUTPATIENT
Start: 2025-01-01 | End: 2025-01-01

## 2025-01-01 RX ORDER — INDOMETHACIN 25 MG/1
CAPSULE ORAL
Status: DISCONTINUED | OUTPATIENT
Start: 2025-01-01 | End: 2025-01-01 | Stop reason: HOSPADM

## 2025-01-01 RX ORDER — AMOXICILLIN 250 MG
2 CAPSULE ORAL 2 TIMES DAILY
Status: DISCONTINUED | OUTPATIENT
Start: 2025-01-01 | End: 2025-01-01

## 2025-01-01 RX ORDER — POTASSIUM CHLORIDE 1.5 G/1.58G
60 POWDER, FOR SOLUTION ORAL ONCE
Status: COMPLETED | OUTPATIENT
Start: 2025-01-01 | End: 2025-01-01

## 2025-01-01 RX ORDER — BUPIVACAINE HCL/0.9 % NACL/PF 0.25 %
.02-.5 PLASTIC BAG, INJECTION (ML) EPIDURAL CONTINUOUS PRN
Status: DISCONTINUED | OUTPATIENT
Start: 2025-01-01 | End: 2025-01-01 | Stop reason: HOSPADM

## 2025-01-01 RX ORDER — HEPARIN SODIUM 1000 [USP'U]/ML
50 INJECTION, SOLUTION INTRAVENOUS; SUBCUTANEOUS AS NEEDED
Status: DISCONTINUED | OUTPATIENT
Start: 2025-01-01 | End: 2025-01-01

## 2025-01-01 RX ORDER — DEXMEDETOMIDINE HYDROCHLORIDE 4 UG/ML
.2-1.5 INJECTION, SOLUTION INTRAVENOUS CONTINUOUS PRN
Status: DISCONTINUED | OUTPATIENT
Start: 2025-01-01 | End: 2025-01-01 | Stop reason: HOSPADM

## 2025-01-01 RX ORDER — ALPRAZOLAM 0.25 MG
0.25 TABLET ORAL 3 TIMES DAILY PRN
Status: DISCONTINUED | OUTPATIENT
Start: 2025-01-01 | End: 2025-01-01 | Stop reason: HOSPADM

## 2025-01-01 RX ORDER — INDOMETHACIN 25 MG/1
100 CAPSULE ORAL ONCE
Status: COMPLETED | OUTPATIENT
Start: 2025-01-01 | End: 2025-01-01

## 2025-01-01 RX ORDER — CLOPIDOGREL BISULFATE 75 MG/1
75 TABLET ORAL DAILY
Status: DISCONTINUED | OUTPATIENT
Start: 2025-01-01 | End: 2025-01-01

## 2025-01-01 RX ORDER — POLYETHYLENE GLYCOL 3350 17 G/17G
17 POWDER, FOR SOLUTION ORAL DAILY PRN
Status: DISCONTINUED | OUTPATIENT
Start: 2025-01-01 | End: 2025-01-01 | Stop reason: HOSPADM

## 2025-01-01 RX ORDER — NICOTINE POLACRILEX 4 MG
15 LOZENGE BUCCAL
Status: DISCONTINUED | OUTPATIENT
Start: 2025-01-01 | End: 2025-01-01 | Stop reason: HOSPADM

## 2025-01-01 RX ORDER — PHENYLEPHRINE HYDROCHLORIDE 10 MG/ML
INJECTION INTRAVENOUS
Status: ACTIVE
Start: 2025-01-01 | End: 2025-01-01

## 2025-01-01 RX ORDER — AMLODIPINE BESYLATE 10 MG/1
10 TABLET ORAL DAILY
Qty: 90 TABLET | Refills: 1 | Status: SHIPPED | OUTPATIENT
Start: 2025-01-01

## 2025-01-01 RX ORDER — PANTOPRAZOLE SODIUM 40 MG/10ML
40 INJECTION, POWDER, LYOPHILIZED, FOR SOLUTION INTRAVENOUS DAILY
Status: DISCONTINUED | OUTPATIENT
Start: 2025-04-08 | End: 2025-01-01 | Stop reason: HOSPADM

## 2025-01-01 RX ORDER — EPTIFIBATIDE 2 MG/ML
INJECTION, SOLUTION INTRAVENOUS
Status: DISCONTINUED | OUTPATIENT
Start: 2025-01-01 | End: 2025-01-01 | Stop reason: HOSPADM

## 2025-01-01 RX ORDER — BISACODYL 5 MG/1
5 TABLET, DELAYED RELEASE ORAL DAILY PRN
Status: DISCONTINUED | OUTPATIENT
Start: 2025-01-01 | End: 2025-01-01

## 2025-01-01 RX ORDER — ALPRAZOLAM 0.25 MG
0.25 TABLET ORAL 3 TIMES DAILY PRN
Status: DISCONTINUED | OUTPATIENT
Start: 2025-01-01 | End: 2025-01-01

## 2025-01-01 RX ORDER — SODIUM CHLORIDE 9 MG/ML
100 INJECTION, SOLUTION INTRAVENOUS CONTINUOUS
Status: DISCONTINUED | OUTPATIENT
Start: 2025-01-01 | End: 2025-01-01

## 2025-01-01 RX ORDER — HYDROCODONE BITARTRATE AND ACETAMINOPHEN 7.5; 325 MG/1; MG/1
1 TABLET ORAL EVERY 4 HOURS PRN
Refills: 0 | Status: DISCONTINUED | OUTPATIENT
Start: 2025-01-01 | End: 2025-01-01 | Stop reason: HOSPADM

## 2025-01-01 RX ORDER — MORPHINE SULFATE 2 MG/ML
1 INJECTION, SOLUTION INTRAMUSCULAR; INTRAVENOUS EVERY 4 HOURS PRN
Status: DISCONTINUED | OUTPATIENT
Start: 2025-01-01 | End: 2025-01-01 | Stop reason: HOSPADM

## 2025-01-01 RX ORDER — EPTIFIBATIDE 0.75 MG/ML
INJECTION, SOLUTION INTRAVENOUS
Status: DISCONTINUED | OUTPATIENT
Start: 2025-01-01 | End: 2025-01-01 | Stop reason: HOSPADM

## 2025-01-01 RX ORDER — IBUPROFEN 600 MG/1
1 TABLET ORAL
Status: DISCONTINUED | OUTPATIENT
Start: 2025-01-01 | End: 2025-01-01

## 2025-01-01 RX ORDER — LIDOCAINE HYDROCHLORIDE 10 MG/ML
INJECTION, SOLUTION EPIDURAL; INFILTRATION; INTRACAUDAL; PERINEURAL
Status: DISCONTINUED | OUTPATIENT
Start: 2025-01-01 | End: 2025-01-01 | Stop reason: HOSPADM

## 2025-01-01 RX ORDER — MIDAZOLAM HYDROCHLORIDE 1 MG/ML
INJECTION, SOLUTION INTRAMUSCULAR; INTRAVENOUS
Status: DISCONTINUED | OUTPATIENT
Start: 2025-01-01 | End: 2025-01-01 | Stop reason: HOSPADM

## 2025-01-01 RX ORDER — MAGNESIUM SULFATE HEPTAHYDRATE 40 MG/ML
2 INJECTION, SOLUTION INTRAVENOUS ONCE
Status: COMPLETED | OUTPATIENT
Start: 2025-01-01 | End: 2025-01-01

## 2025-01-01 RX ORDER — HEPARIN SODIUM 1000 [USP'U]/ML
INJECTION, SOLUTION INTRAVENOUS; SUBCUTANEOUS
Status: DISCONTINUED | OUTPATIENT
Start: 2025-01-01 | End: 2025-01-01 | Stop reason: HOSPADM

## 2025-01-01 RX ORDER — DEXTROSE MONOHYDRATE 25 G/50ML
25 INJECTION, SOLUTION INTRAVENOUS
Status: DISCONTINUED | OUTPATIENT
Start: 2025-01-01 | End: 2025-01-01

## 2025-01-01 RX ORDER — CLOPIDOGREL BISULFATE 75 MG/1
75 TABLET ORAL DAILY
Status: DISCONTINUED | OUTPATIENT
Start: 2025-01-01 | End: 2025-01-01 | Stop reason: HOSPADM

## 2025-01-01 RX ORDER — BISACODYL 10 MG
10 SUPPOSITORY, RECTAL RECTAL DAILY PRN
Status: DISCONTINUED | OUTPATIENT
Start: 2025-01-01 | End: 2025-01-01

## 2025-01-01 RX ORDER — POLYETHYLENE GLYCOL 3350 17 G/17G
17 POWDER, FOR SOLUTION ORAL DAILY PRN
Status: DISCONTINUED | OUTPATIENT
Start: 2025-01-01 | End: 2025-01-01

## 2025-01-01 RX ORDER — CLOPIDOGREL BISULFATE 75 MG/1
TABLET ORAL
Status: DISCONTINUED | OUTPATIENT
Start: 2025-01-01 | End: 2025-01-01 | Stop reason: HOSPADM

## 2025-01-01 RX ORDER — VECURONIUM BROMIDE FOR INJECTION 1 MG/ML
0.1 INJECTION, POWDER, LYOPHILIZED, FOR SOLUTION INTRAVENOUS
Status: DISCONTINUED | OUTPATIENT
Start: 2025-01-01 | End: 2025-01-01 | Stop reason: HOSPADM

## 2025-01-01 RX ADMIN — SODIUM CHLORIDE 0.18 MCG/KG/MIN: 9 INJECTION, SOLUTION INTRAVENOUS at 03:25

## 2025-01-01 RX ADMIN — ALBUMIN (HUMAN) 25 G: 0.25 INJECTION, SOLUTION INTRAVENOUS at 04:21

## 2025-01-01 RX ADMIN — PIPERACILLIN AND TAZOBACTAM 4.5 G: 4; .5 INJECTION, POWDER, FOR SOLUTION INTRAVENOUS at 13:09

## 2025-01-01 RX ADMIN — DOBUTAMINE HYDROCHLORIDE 2.5 MCG/KG/MIN: 100 INJECTION INTRAVENOUS at 02:50

## 2025-01-01 RX ADMIN — INSULIN HUMAN 6.7 UNITS/HR: 1 INJECTION, SOLUTION INTRAVENOUS at 01:53

## 2025-01-01 RX ADMIN — VASOPRESSIN 0.03 UNITS/MIN: 0.2 SOLUTION INTRAVENOUS at 22:10

## 2025-01-01 RX ADMIN — ALBUMIN (HUMAN) 25 G: 0.25 INJECTION, SOLUTION INTRAVENOUS at 22:22

## 2025-01-01 RX ADMIN — MUPIROCIN 1 APPLICATION: 20 OINTMENT TOPICAL at 21:09

## 2025-01-01 RX ADMIN — Medication 100 MCG/HR: at 20:39

## 2025-01-01 RX ADMIN — WHITE PETROLATUM 57.7 %-MINERAL OIL 31.9 % EYE OINTMENT: at 03:48

## 2025-01-01 RX ADMIN — Medication 200 MCG/HR: at 11:58

## 2025-01-01 RX ADMIN — MUPIROCIN 1 APPLICATION: 20 OINTMENT TOPICAL at 08:04

## 2025-01-01 RX ADMIN — WHITE PETROLATUM 57.7 %-MINERAL OIL 31.9 % EYE OINTMENT: at 08:03

## 2025-01-01 RX ADMIN — POTASSIUM CHLORIDE 20 MEQ: 400 INJECTION, SOLUTION INTRAVENOUS at 18:32

## 2025-01-01 RX ADMIN — POTASSIUM CHLORIDE 60 MEQ: 1.5 FOR SOLUTION ORAL at 01:08

## 2025-01-01 RX ADMIN — NOREPINEPHRINE BITARTRATE 0.5 MCG/KG/MIN: 0.03 INJECTION, SOLUTION INTRAVENOUS at 03:47

## 2025-01-01 RX ADMIN — PHENYLEPHRINE HYDROCHLORIDE 5 MCG/KG/MIN: 10 INJECTION INTRAVENOUS at 13:07

## 2025-01-01 RX ADMIN — INSULIN HUMAN 10.5 UNITS/HR: 1 INJECTION, SOLUTION INTRAVENOUS at 08:05

## 2025-01-01 RX ADMIN — POTASSIUM CHLORIDE 20 MEQ: 400 INJECTION, SOLUTION INTRAVENOUS at 23:35

## 2025-01-01 RX ADMIN — WHITE PETROLATUM 57.7 %-MINERAL OIL 31.9 % EYE OINTMENT: at 18:33

## 2025-01-01 RX ADMIN — NOREPINEPHRINE BITARTRATE 0.28 MCG/KG/MIN: 0.03 INJECTION, SOLUTION INTRAVENOUS at 19:16

## 2025-01-01 RX ADMIN — HYDROCORTISONE SODIUM SUCCINATE 250 MG: 250 INJECTION, POWDER, FOR SOLUTION INTRAMUSCULAR; INTRAVENOUS at 22:24

## 2025-01-01 RX ADMIN — PHENYLEPHRINE HYDROCHLORIDE 2.5 MCG/KG/MIN: 10 INJECTION INTRAVENOUS at 21:55

## 2025-01-01 RX ADMIN — PHENYLEPHRINE HYDROCHLORIDE 5 MCG/KG/MIN: 10 INJECTION INTRAVENOUS at 06:26

## 2025-01-01 RX ADMIN — WHITE PETROLATUM 57.7 %-MINERAL OIL 31.9 % EYE OINTMENT: at 09:58

## 2025-01-01 RX ADMIN — HEPARIN SODIUM 5000 UNITS: 1000 INJECTION, SOLUTION INTRAVENOUS; SUBCUTANEOUS at 11:58

## 2025-01-01 RX ADMIN — SODIUM BICARBONATE 100 MEQ: 84 INJECTION INTRAVENOUS at 16:09

## 2025-01-01 RX ADMIN — NOREPINEPHRINE BITARTRATE 0.5 MCG/KG/MIN: 0.03 INJECTION, SOLUTION INTRAVENOUS at 06:27

## 2025-01-01 RX ADMIN — HEPARIN SODIUM 12 UNITS/KG/HR: 10000 INJECTION, SOLUTION INTRAVENOUS at 18:36

## 2025-01-01 RX ADMIN — POTASSIUM CHLORIDE 20 MEQ: 400 INJECTION, SOLUTION INTRAVENOUS at 02:32

## 2025-01-01 RX ADMIN — MAGNESIUM SULFATE HEPTAHYDRATE 2 G: 40 INJECTION, SOLUTION INTRAVENOUS at 22:31

## 2025-01-01 RX ADMIN — WHITE PETROLATUM 57.7 %-MINERAL OIL 31.9 % EYE OINTMENT: at 23:33

## 2025-01-01 RX ADMIN — HEPARIN SODIUM 5000 UNITS: 1000 INJECTION INTRAVENOUS; SUBCUTANEOUS at 11:58

## 2025-01-01 RX ADMIN — POTASSIUM CHLORIDE 20 MEQ: 400 INJECTION, SOLUTION INTRAVENOUS at 19:39

## 2025-01-01 RX ADMIN — WHITE PETROLATUM 57.7 %-MINERAL OIL 31.9 % EYE OINTMENT: at 16:37

## 2025-01-01 RX ADMIN — EPINEPHRINE 1 MG: 0.1 INJECTION INTRAVENOUS at 11:36

## 2025-01-01 RX ADMIN — MUPIROCIN 1 APPLICATION: 20 OINTMENT TOPICAL at 16:37

## 2025-01-01 RX ADMIN — SODIUM BICARBONATE 50 ML/HR: 84 INJECTION, SOLUTION INTRAVENOUS at 22:53

## 2025-01-01 RX ADMIN — NOREPINEPHRINE BITARTRATE 0.3 MCG/KG/MIN: 0.03 INJECTION, SOLUTION INTRAVENOUS at 23:33

## 2025-01-01 RX ADMIN — POTASSIUM CHLORIDE 20 MEQ: 400 INJECTION, SOLUTION INTRAVENOUS at 03:39

## 2025-01-01 RX ADMIN — POTASSIUM CHLORIDE 60 MEQ: 1.5 FOR SOLUTION ORAL at 21:10

## 2025-01-01 RX ADMIN — HYDROCORTISONE SODIUM SUCCINATE 100 MG: 100 INJECTION, POWDER, FOR SOLUTION INTRAMUSCULAR; INTRAVENOUS at 05:10

## 2025-01-01 RX ADMIN — CLOPIDOGREL BISULFATE 75 MG: 75 TABLET, FILM COATED ORAL at 08:03

## 2025-01-01 RX ADMIN — SODIUM CHLORIDE 0.08 MCG/KG/MIN: 9 INJECTION, SOLUTION INTRAVENOUS at 11:47

## 2025-01-01 RX ADMIN — PHENYLEPHRINE HYDROCHLORIDE 5 MCG/KG/MIN: 10 INJECTION INTRAVENOUS at 04:22

## 2025-01-01 RX ADMIN — SODIUM BICARBONATE 150 MEQ: 84 INJECTION INTRAVENOUS at 19:20

## 2025-01-01 RX ADMIN — WHITE PETROLATUM 57.7 %-MINERAL OIL 31.9 % EYE OINTMENT: at 19:40

## 2025-01-01 RX ADMIN — NOREPINEPHRINE BITARTRATE 0.5 MCG/KG/MIN: 0.03 INJECTION, SOLUTION INTRAVENOUS at 09:58

## 2025-01-01 RX ADMIN — ATORVASTATIN CALCIUM 10 MG: 10 TABLET, FILM COATED ORAL at 20:28

## 2025-01-01 RX ADMIN — VASOPRESSIN 0.03 UNITS/MIN: 0.2 SOLUTION INTRAVENOUS at 04:32

## 2025-01-01 RX ADMIN — PROPOFOL 35 MCG/KG/MIN: 10 INJECTION, EMULSION INTRAVENOUS at 21:33

## 2025-01-01 RX ADMIN — SODIUM CHLORIDE 0.3 MCG/KG/MIN: 9 INJECTION, SOLUTION INTRAVENOUS at 11:37

## 2025-01-01 RX ADMIN — DEXTROSE MONOHYDRATE 50 ML: 25 INJECTION, SOLUTION INTRAVENOUS at 13:17

## 2025-01-01 RX ADMIN — PIPERACILLIN AND TAZOBACTAM 4.5 G: 4; .5 INJECTION, POWDER, FOR SOLUTION INTRAVENOUS at 04:39

## 2025-01-01 RX ADMIN — PHENYLEPHRINE HYDROCHLORIDE 3 MCG/KG/MIN: 10 INJECTION INTRAVENOUS at 01:59

## 2025-01-01 RX ADMIN — SODIUM CHLORIDE 1000 ML: 9 INJECTION, SOLUTION INTRAVENOUS at 12:01

## 2025-01-01 RX ADMIN — POTASSIUM CHLORIDE 20 MEQ: 400 INJECTION, SOLUTION INTRAVENOUS at 22:33

## 2025-01-01 RX ADMIN — ALBUMIN (HUMAN) 25 G: 0.25 INJECTION, SOLUTION INTRAVENOUS at 10:59

## 2025-01-01 RX ADMIN — POTASSIUM CHLORIDE 20 MEQ: 400 INJECTION, SOLUTION INTRAVENOUS at 21:33

## 2025-01-01 RX ADMIN — SODIUM CHLORIDE 40 MMOL: 9 INJECTION, SOLUTION INTRAVENOUS at 23:42

## 2025-01-01 RX ADMIN — HEPARIN SODIUM 10 UNITS/KG/HR: 10000 INJECTION, SOLUTION INTRAVENOUS at 01:30

## 2025-01-01 RX ADMIN — PHENYLEPHRINE HYDROCHLORIDE 5 MCG/KG/MIN: 10 INJECTION INTRAVENOUS at 08:04

## 2025-01-01 RX ADMIN — SODIUM BICARBONATE 50 MEQ: 84 INJECTION INTRAVENOUS at 04:45

## 2025-01-01 RX ADMIN — SODIUM BICARBONATE 75 ML/HR: 84 INJECTION, SOLUTION INTRAVENOUS at 13:22

## 2025-01-01 RX ADMIN — PHENYLEPHRINE HYDROCHLORIDE 5 MCG/KG/MIN: 10 INJECTION INTRAVENOUS at 10:43

## 2025-01-01 RX ADMIN — SODIUM BICARBONATE 100 MEQ: 84 INJECTION INTRAVENOUS at 01:19

## 2025-01-01 RX ADMIN — PIPERACILLIN AND TAZOBACTAM 4.5 G: 4; .5 INJECTION, POWDER, FOR SOLUTION INTRAVENOUS at 22:35

## 2025-01-01 RX ADMIN — PHENYLEPHRINE HYDROCHLORIDE 0.5 MCG/KG/MIN: 10 INJECTION INTRAVENOUS at 15:45

## 2025-01-02 ENCOUNTER — OFFICE VISIT (OUTPATIENT)
Age: 70
End: 2025-01-02
Payer: MEDICARE

## 2025-01-02 DIAGNOSIS — F43.81 PROLONGED GRIEF DISORDER: ICD-10-CM

## 2025-01-02 DIAGNOSIS — I10 PRIMARY HYPERTENSION: ICD-10-CM

## 2025-01-02 DIAGNOSIS — F43.10 POST TRAUMATIC STRESS DISORDER (PTSD): Primary | ICD-10-CM

## 2025-01-02 RX ORDER — METOPROLOL TARTRATE 100 MG/1
100 TABLET ORAL 2 TIMES DAILY
Qty: 180 TABLET | Refills: 0 | Status: SHIPPED | OUTPATIENT
Start: 2025-01-02

## 2025-01-02 NOTE — PATIENT INSTRUCTIONS
"Client will begin utilizing the DBT technique of AAA (awareness) by practicing the identification of the 3 components of the cognitive triangle (thoughts, feelings, behaviors).    Follow-up:     Client agrees to keep all follow-up appointments with [facility].       Resources:     Contact Office at [phone], [phone], Text \"HOME\" to [phone], and/or go to the nearest Hospital/ER.   "
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
## 2025-01-30 DIAGNOSIS — K21.9 GASTROESOPHAGEAL REFLUX DISEASE, UNSPECIFIED WHETHER ESOPHAGITIS PRESENT: ICD-10-CM

## 2025-01-30 RX ORDER — OMEPRAZOLE 40 MG/1
40 CAPSULE, DELAYED RELEASE ORAL DAILY
Qty: 90 CAPSULE | Refills: 0 | Status: SHIPPED | OUTPATIENT
Start: 2025-01-30

## 2025-02-10 ENCOUNTER — OFFICE VISIT (OUTPATIENT)
Dept: FAMILY MEDICINE CLINIC | Facility: CLINIC | Age: 70
End: 2025-02-10
Payer: MEDICARE

## 2025-02-10 ENCOUNTER — LAB (OUTPATIENT)
Dept: LAB | Facility: HOSPITAL | Age: 70
End: 2025-02-10
Payer: MEDICARE

## 2025-02-10 VITALS
OXYGEN SATURATION: 100 % | SYSTOLIC BLOOD PRESSURE: 124 MMHG | TEMPERATURE: 98.3 F | HEART RATE: 77 BPM | BODY MASS INDEX: 32.51 KG/M2 | WEIGHT: 172.2 LBS | HEIGHT: 61 IN | DIASTOLIC BLOOD PRESSURE: 80 MMHG

## 2025-02-10 DIAGNOSIS — Z23 IMMUNIZATION DUE: ICD-10-CM

## 2025-02-10 DIAGNOSIS — Z00.00 MEDICARE ANNUAL WELLNESS VISIT, SUBSEQUENT: Primary | ICD-10-CM

## 2025-02-10 DIAGNOSIS — E78.5 DYSLIPIDEMIA: ICD-10-CM

## 2025-02-10 DIAGNOSIS — Z00.00 MEDICARE ANNUAL WELLNESS VISIT, SUBSEQUENT: ICD-10-CM

## 2025-02-10 DIAGNOSIS — R94.4 DECREASED GFR: ICD-10-CM

## 2025-02-10 DIAGNOSIS — R76.8 POSITIVE HEPATITIS C ANTIBODY TEST: ICD-10-CM

## 2025-02-10 DIAGNOSIS — Z12.11 COLON CANCER SCREENING: ICD-10-CM

## 2025-02-10 DIAGNOSIS — I10 PRIMARY HYPERTENSION: ICD-10-CM

## 2025-02-10 DIAGNOSIS — F33.2 SEVERE EPISODE OF RECURRENT MAJOR DEPRESSIVE DISORDER, WITHOUT PSYCHOTIC FEATURES: ICD-10-CM

## 2025-02-10 PROBLEM — Z71.1 PHYSICALLY WELL BUT WORRIED: Status: RESOLVED | Noted: 2024-01-22 | Resolved: 2025-02-10

## 2025-02-10 PROBLEM — I16.0 HYPERTENSIVE URGENCY: Status: RESOLVED | Noted: 2024-06-25 | Resolved: 2025-02-10

## 2025-02-10 LAB
ALBUMIN SERPL-MCNC: 4.2 G/DL (ref 3.5–5.2)
ALBUMIN/GLOB SERPL: 1.2 G/DL
ALP SERPL-CCNC: 99 U/L (ref 39–117)
ALT SERPL W P-5'-P-CCNC: 16 U/L (ref 1–33)
ANION GAP SERPL CALCULATED.3IONS-SCNC: 10.2 MMOL/L (ref 5–15)
AST SERPL-CCNC: 18 U/L (ref 1–32)
BILIRUB SERPL-MCNC: 0.4 MG/DL (ref 0–1.2)
BILIRUB UR QL STRIP: NEGATIVE
BUN SERPL-MCNC: 17 MG/DL (ref 8–23)
BUN/CREAT SERPL: 14.8 (ref 7–25)
CALCIUM SPEC-SCNC: 10.1 MG/DL (ref 8.6–10.5)
CHLORIDE SERPL-SCNC: 103 MMOL/L (ref 98–107)
CHOLEST SERPL-MCNC: 143 MG/DL (ref 0–200)
CLARITY UR: CLEAR
CO2 SERPL-SCNC: 26.8 MMOL/L (ref 22–29)
COLOR UR: YELLOW
CREAT SERPL-MCNC: 1.15 MG/DL (ref 0.57–1)
DEPRECATED RDW RBC AUTO: 37.1 FL (ref 37–54)
EGFRCR SERPLBLD CKD-EPI 2021: 51.4 ML/MIN/1.73
ERYTHROCYTE [DISTWIDTH] IN BLOOD BY AUTOMATED COUNT: 12.4 % (ref 12.3–15.4)
GLOBULIN UR ELPH-MCNC: 3.6 GM/DL
GLUCOSE SERPL-MCNC: 97 MG/DL (ref 65–99)
GLUCOSE UR STRIP-MCNC: NEGATIVE MG/DL
HBA1C MFR BLD: 5.5 % (ref 4.8–5.6)
HCT VFR BLD AUTO: 42.4 % (ref 34–46.6)
HCV AB SER QL: REACTIVE
HDLC SERPL-MCNC: 35 MG/DL (ref 40–60)
HGB BLD-MCNC: 14.2 G/DL (ref 12–15.9)
HGB UR QL STRIP.AUTO: NEGATIVE
KETONES UR QL STRIP: NEGATIVE
LDLC SERPL CALC-MCNC: 78 MG/DL (ref 0–100)
LDLC/HDLC SERPL: 2.1 {RATIO}
LEUKOCYTE ESTERASE UR QL STRIP.AUTO: NEGATIVE
MAGNESIUM SERPL-MCNC: 1.9 MG/DL (ref 1.6–2.4)
MCH RBC QN AUTO: 27.7 PG (ref 26.6–33)
MCHC RBC AUTO-ENTMCNC: 33.5 G/DL (ref 31.5–35.7)
MCV RBC AUTO: 82.7 FL (ref 79–97)
NITRITE UR QL STRIP: NEGATIVE
PH UR STRIP.AUTO: 6.5 [PH] (ref 5–8)
PLATELET # BLD AUTO: 247 10*3/MM3 (ref 140–450)
PMV BLD AUTO: 9.8 FL (ref 6–12)
POTASSIUM SERPL-SCNC: 4.6 MMOL/L (ref 3.5–5.2)
PROT SERPL-MCNC: 7.8 G/DL (ref 6–8.5)
PROT UR QL STRIP: NEGATIVE
RBC # BLD AUTO: 5.13 10*6/MM3 (ref 3.77–5.28)
SODIUM SERPL-SCNC: 140 MMOL/L (ref 136–145)
SP GR UR STRIP: 1.01 (ref 1–1.03)
TRIGL SERPL-MCNC: 173 MG/DL (ref 0–150)
TSH SERPL DL<=0.05 MIU/L-ACNC: 2.34 UIU/ML (ref 0.27–4.2)
UROBILINOGEN UR QL STRIP: NORMAL
VLDLC SERPL-MCNC: 30 MG/DL (ref 5–40)
WBC NRBC COR # BLD AUTO: 5.53 10*3/MM3 (ref 3.4–10.8)

## 2025-02-10 PROCEDURE — 83735 ASSAY OF MAGNESIUM: CPT

## 2025-02-10 PROCEDURE — 80053 COMPREHEN METABOLIC PANEL: CPT

## 2025-02-10 PROCEDURE — 1126F AMNT PAIN NOTED NONE PRSNT: CPT

## 2025-02-10 PROCEDURE — 1159F MED LIST DOCD IN RCRD: CPT

## 2025-02-10 PROCEDURE — 3079F DIAST BP 80-89 MM HG: CPT

## 2025-02-10 PROCEDURE — 80061 LIPID PANEL: CPT

## 2025-02-10 PROCEDURE — 84443 ASSAY THYROID STIM HORMONE: CPT

## 2025-02-10 PROCEDURE — 83036 HEMOGLOBIN GLYCOSYLATED A1C: CPT

## 2025-02-10 PROCEDURE — 90677 PCV20 VACCINE IM: CPT

## 2025-02-10 PROCEDURE — 86803 HEPATITIS C AB TEST: CPT

## 2025-02-10 PROCEDURE — 1170F FXNL STATUS ASSESSED: CPT

## 2025-02-10 PROCEDURE — 1160F RVW MEDS BY RX/DR IN RCRD: CPT

## 2025-02-10 PROCEDURE — G0439 PPPS, SUBSEQ VISIT: HCPCS

## 2025-02-10 PROCEDURE — 85027 COMPLETE CBC AUTOMATED: CPT

## 2025-02-10 PROCEDURE — 3074F SYST BP LT 130 MM HG: CPT

## 2025-02-10 PROCEDURE — 81003 URINALYSIS AUTO W/O SCOPE: CPT

## 2025-02-10 PROCEDURE — G0009 ADMIN PNEUMOCOCCAL VACCINE: HCPCS

## 2025-02-10 NOTE — ASSESSMENT & PLAN NOTE
Hypertension is stable and controlled  Continue current treatment regimen.  Dietary sodium restriction.  Regular aerobic exercise.  Ambulatory blood pressure monitoring.

## 2025-02-10 NOTE — PROGRESS NOTES
Subjective   The ABCs of the Annual Wellness Visit  Medicare Wellness Visit      Falguni Laureano is a 69 y.o. patient who presents for a Medicare Wellness Visit.    The following portions of the patient's history were reviewed and   updated as appropriate: allergies, current medications, past family history, past medical history, past social history, past surgical history, and problem list.    Compared to one year ago, the patient's physical   health is better.  Compared to one year ago, the patient's mental   health is the same.    Recent Hospitalizations:  This patient has had a [facility] admission record on file within the last 365 days.  Current Medical Providers:  Patient Care Team:  Fiona Fitch PA-C as PCP - General (Physician Assistant)  Jeyson Olivo MD as Consulting Physician (Cardiology)  Fiona Fitch PA-C as Referring Physician (Physician Assistant)    Outpatient Medications Prior to Visit   Medication Sig Dispense Refill    acetaminophen (TYLENOL) 500 MG tablet Take 1 tablet by mouth Every 6 (Six) Hours As Needed for Mild Pain. Indications: Pain      amLODIPine (NORVASC) 10 MG tablet Take 1 tablet by mouth Daily. 90 tablet 1    atorvastatin (LIPITOR) 10 MG tablet TAKE 1 TABLET BY MOUTH EVERY NIGHT 90 tablet 3    calcium carbonate (OS-EVELIO) 600 MG tablet Take 1 tablet by mouth Daily. Indications: Low Amount of Calcium in the Blood      lisinopril (PRINIVIL,ZESTRIL) 40 MG tablet Take 1 tablet by mouth Daily. 90 tablet 1    metoprolol tartrate (LOPRESSOR) 100 MG tablet TAKE 1 TABLET BY MOUTH TWICE DAILY 180 tablet 0    omeprazole (priLOSEC) 40 MG capsule TAKE 1 CAPSULE BY MOUTH DAILY FOR HEARTBURN 90 capsule 0     No facility-administered medications prior to visit.     No opioid medication identified on active medication list. I have reviewed chart for other potential  high risk medication/s and harmful drug interactions in the elderly.      Aspirin is not on active medication list.   "Aspirin use is not indicated based on review of current medical condition/s. Risk of harm outweighs potential benefits.  .    Patient Active Problem List   Diagnosis    Hypertension    Gastroesophageal reflux disease    Physically well but worried    Dermatitis    Bilateral impacted cerumen    Dyslipidemia    Hypertensive urgency    Depression    Poor sleep pattern    Arthritis    Abnormal EKG    Hypertensive heart disease without heart failure     Advance Care Planning {Advance Care Planning Hyperlink:23}Advance Directive is not on file.  ACP discussion was held with the patient during this visit. Patient does not have an advance directive, information provided.            Objective   Vitals:    02/10/25 1040   BP: 124/80   BP Location: Right arm   Patient Position: Sitting   Cuff Size: Adult   Pulse: 77   Temp: 98.3 °F (36.8 °C)   TempSrc: Oral   SpO2: 100%   Weight: 78.1 kg (172 lb 3.2 oz)   Height: 154.9 cm (60.98\")   PainSc: 0-No pain       Estimated body mass index is 32.56 kg/m² as calculated from the following:    Height as of this encounter: 154.9 cm (60.98\").    Weight as of this encounter: 78.1 kg (172 lb 3.2 oz).    BMI is >= 30 and <35. (Class 1 Obesity). The following options were offered after discussion;: exercise counseling/recommendations and nutrition counseling/recommendations    {Help Text;  If you change Medicare Wellness reason for visit to a Welcome to Medicare reason for visit after the encounter has started, please add SmartPhrase .GAITBALANCE to your note for proper gait and balance documentation. This text will disappear after the note is signed:40948}   {Jump to Steadi Fall Risk Flowsheet:23}Gait and Balance Evaluation:  Normal      Does the patient have evidence of cognitive impairment? {Yes/No:41620}                                                                                                Health  Risk Assessment    Smoking Status:  Social History     Tobacco Use   Smoking Status " Never    Passive exposure: Never   Smokeless Tobacco Never     Alcohol Consumption:  Social History     Substance and Sexual Activity   Alcohol Use Not Currently       Fall Risk Screen{Jump to Steadi Fall Risk Flowsheet:23}  STEADI Fall Risk Assessment was completed, and patient is at LOW risk for falls.Assessment completed on:2/10/2025    Depression Screening{Jump to PHQ SmartForm:23}   Little interest or pleasure in doing things? Not at all   Feeling down, depressed, or hopeless? Not at all   PHQ-2 Total Score 0      Health Habits and Functional and Cognitive Screenin/10/2025    10:46 AM   Functional & Cognitive Status   Do you have difficulty preparing food and eating? No   Do you have difficulty bathing yourself, getting dressed or grooming yourself? No   Do you have difficulty using the toilet? No   Do you have difficulty moving around from place to place? No   Do you have trouble with steps or getting out of a bed or a chair? No   Current Diet Well Balanced Diet   Dental Exam Not up to date   Eye Exam Up to date   Exercise (times per week) 4 times per week   Current Exercises Include Walking;House Cleaning   Do you need help using the phone?  No   Are you deaf or do you have serious difficulty hearing?  No   Do you need help to go to places out of walking distance? No   Do you need help shopping? No   Do you need help preparing meals?  No   Do you need help with housework?  No   Do you need help with laundry? No   Do you need help taking your medications? No   Do you need help managing money? No   Do you ever drive or ride in a car without wearing a seat belt? No   Have you felt unusual stress, anger or loneliness in the last month? Yes   Who do you live with? Other   If you need help, do you have trouble finding someone available to you? No   Have you been bothered in the last four weeks by sexual problems? No   Do you have difficulty concentrating, remembering or making decisions? No            Age-appropriate Screening Schedule:  Refer to the list below for future screening recommendations based on patient's age, sex and/or medical conditions. Orders for these recommended tests are listed in the plan section. The patient has been provided with a written plan.    Health Maintenance List  Health Maintenance   Topic Date Due    COLORECTAL CANCER SCREENING  Never done    TDAP/TD VACCINES (1 - Tdap) Never done    Pneumococcal Vaccine 65+ (1 of 1 - PCV) Never done    ZOSTER VACCINE (1 of 2) Never done    HEPATITIS C SCREENING  Never done    ANNUAL WELLNESS VISIT  Never done    COVID-19 Vaccine (1 - 2024-25 season) Never done    BMI FOLLOWUP  01/22/2025    DXA SCAN  05/02/2026    MAMMOGRAM  06/19/2026    INFLUENZA VACCINE  Completed                                                                                                                                                CMS Preventative Services Quick Reference  Risk Factors Identified During Encounter  {Medicare Wellness Risk Factors:68735}    The above risks/problems have been discussed with the patient.  Pertinent information has been shared with the patient in the After Visit Summary.  An After Visit Summary and PPPS were made available to the patient.    Follow Up:{Wrapup  Review (Popup)  Advance Care Planning  Labs  CC  Problem List  Visit Diagnosis  Medications  Result Review  Imaging  [facility]  BestPractice  SmartSets  SnapShot  Encounters  Notes  Media  Procedures :23}   Next Medicare Wellness visit to be scheduled in 1 year.         Additional E&M Note during same encounter follows:  Patient has additional, significant, and separately identifiable condition(s)/problem(s) that require work above and beyond the Medicare Wellness Visit     Chief Complaint  Medicare Wellness-subsequent    Subjective {Problem List  Visit Diagnosis   Encounters  Notes  Medications  Labs  Result Review Imaging  Media  ":23}{Help Text;  If performing a Preventative Medicine Visit (e.g. 99397) in addition to AWV, choose the 1st SmartList option below and document any ROS/PE performed.  If performing a separately identifiable E/M service (e.g. 80836), choose 2nd SmartLink option below. This information in red will not appear in the final note after note is signed:55317}  HPI  {AWV/Preventative Exam/EM Progress Note. Use this note if billing for additional Wellness Visit or EM exam in addition to AWV visit (Optional):[id number]}                Objective   Vital Signs:  /80 (BP Location: Right arm, Patient Position: Sitting, Cuff Size: Adult)   Pulse 77   Temp 98.3 °F (36.8 °C) (Oral)   Ht 154.9 cm (60.98\")   Wt 78.1 kg (172 lb 3.2 oz)   SpO2 100%   BMI 32.56 kg/m²   Physical Exam    {The following data was reviewed by (Optional):97847}  {Data reviewed (Optional):75732:::1}  {Ambulatory Labs (Optional):21360}          Assessment and Plan {CC Problem List  Visit Diagnosis   ROS  Review (Popup)  Health Maintenance  Quality  BestPractice  Medications  SmartSets  SnapShot Encounters  Media :23}{Help Text; When performing an adult Preventative Medicine Visit (e.g. 95473) using the optional SmartList below can help when documenting any age-appropriate advice given.  When using the SmartList review and update the information based on the unique discussion or advice given to the patient.  As a reminder, diagnosis code Z00.00 (nl exam) or Z00.01 (abnl exam), should be added when this service is performed.  Text will disappear once the note is signed:72095}{Preventative Physical Additional Health Advice List (Optional):6938500331}           Medicare annual wellness visit, subsequent    Orders:    CBC (No Diff); Future    Lipid Panel; Future    Hemoglobin A1c; Future    Comprehensive Metabolic Panel; Future    Urinalysis With Microscopic If Indicated (No Culture) - Urine, Clean Catch; Future    TSH Rfx On Abnormal To Free " T4; Future    Hepatitis C Antibody; Future    Cologuard - Stool, Per Rectum; Future    Pneumococcal Conjugate Vaccine 20-Valent (PCV20)    Post-menopausal         Colon cancer screening    Orders:    Cologuard - Stool, Per Rectum; Future    Immunization due    Orders:    Pneumococcal Conjugate Vaccine 20-Valent (PCV20)          {Time Spent (Optional):39646}  Follow Up {Instructions Charge Capture  Follow-up Communications :23}  Return in 4 months (on 6/10/2025), or if symptoms worsen or fail to improve, for Recheck.  Patient was given instructions and counseling regarding her condition or for health maintenance advice. Please see specific information pulled into the AVS if appropriate.

## 2025-02-10 NOTE — LETTER
[facility]  Vaccine Consent Form    Patient Name:  Falguni Laureano  Patient :  1955     Vaccine(s) Ordered    Pneumococcal Conjugate Vaccine 20-Valent (PCV20)        Screening Checklist  The following questions should be completed prior to vaccination. If you answer “yes” to any question, it does not necessarily mean you should not be vaccinated. It just means we may need to clarify or ask more questions. If a question is unclear, please ask your healthcare provider to explain it.    Yes No   Any fever or moderate to severe illness today (mild illness and/or antibiotic treatment are not contraindications)?     Do you have a history of a serious reaction to any previous vaccinations, such as anaphylaxis, encephalopathy within 7 days, Guillain-Albert syndrome within 6 weeks, seizure?     Have you received any live vaccine(s) (e.g MMR, MICHAEL) or any other vaccines in the last month (to ensure duplicate doses aren't given)?     Do you have an anaphylactic allergy to latex (DTaP, DTaP-IPV, Hep A, Hep B, MenB, RV, Td, Tdap), baker’s yeast (Hep B, HPV), polysorbates (RSV, nirsevimab, PCV 20, Rotavirrus, Tdap, Shingrix), or gelatin (MICHAEL, MMR)?     Do you have an anaphylactic allergy to neomycin (Rabies, MICHAEL, MMR, IPV, Hep A), polymyxin B (IPV), or streptomycin (IPV)?      Any cancer, leukemia, AIDS, or other immune system disorder? (MICHAEL, MMR, RV)     Do you have a parent, brother, or sister with an immune system problem (if immune competence of vaccine recipient clinically verified, can proceed)? (MMR, MICHAEL)     Any recent steroid treatments for >2 weeks, chemotherapy, or radiation treatment? (MICHAEL, MMR)     Have you received antibody-containing blood transfusions or IVIG in the past 11 months (recommended interval is dependent on product)? (MMR, MICHAEL)     Have you taken antiviral drugs (acyclovir, famciclovir, valacyclovir for MICHAEL) in the last 24 or 48 hours, respectively?      Are you pregnant or planning to become  "pregnant within 1 month? (MICHAEL, MMR, HPV, IPV, MenB, Abrexvy; For Hep B- refer to Engerix-B; For RSV - Abrysvo is indicated for 32-36 weeks of pregnancy from September to January)     For infants, have you ever been told your child has had intussusception or a medical emergency involving obstruction of the intestine (Rotavirus)? If not for an infant, can skip this question.         *Ordering Physicians/APC should be consulted if \"yes\" is checked by the patient or guardian above.  I have received, read, and understand the Vaccine Information Statement (VIS) for each vaccine ordered.  I have considered my or my child's health status as well as the health status of my close contacts.  I have taken the opportunity to discuss my vaccine questions with my or my child's health care provider.   I have requested that the ordered vaccine(s) be given to me or my child.  I understand the benefits and risks of the vaccines.  I understand that I should remain in the clinic for 15 minutes after receiving the vaccine(s).  _________________________________________________________  Signature of Patient or Parent/Legal Guardian ____________________  Date   "

## 2025-02-10 NOTE — PROGRESS NOTES
Subjective   The ABCs of the Annual Wellness Visit  Medicare Wellness Visit      Falguni Laureano is a 69 y.o. patient who presents for a Medicare Wellness Visit.    The following portions of the patient's history were reviewed and   updated as appropriate: allergies, current medications, past family history, past medical history, past social history, past surgical history, and problem list.    Compared to one year ago, the patient's physical   health is better.  Compared to one year ago, the patient's mental   health is the same.    Recent Hospitalizations:  This patient has had a [facility] admission record on file within the last 365 days.  Current Medical Providers:  Patient Care Team:  Fiona Fitch PA-C as PCP - General (Physician Assistant)  Jeyson Olivo MD as Consulting Physician (Cardiology)  Fiona Fitch PA-C as Referring Physician (Physician Assistant)    Outpatient Medications Prior to Visit   Medication Sig Dispense Refill    acetaminophen (TYLENOL) 500 MG tablet Take 1 tablet by mouth Every 6 (Six) Hours As Needed for Mild Pain. Indications: Pain      amLODIPine (NORVASC) 10 MG tablet Take 1 tablet by mouth Daily. 90 tablet 1    atorvastatin (LIPITOR) 10 MG tablet TAKE 1 TABLET BY MOUTH EVERY NIGHT 90 tablet 3    calcium carbonate (OS-EVELIO) 600 MG tablet Take 1 tablet by mouth Daily. Indications: Low Amount of Calcium in the Blood      lisinopril (PRINIVIL,ZESTRIL) 40 MG tablet Take 1 tablet by mouth Daily. 90 tablet 1    metoprolol tartrate (LOPRESSOR) 100 MG tablet TAKE 1 TABLET BY MOUTH TWICE DAILY 180 tablet 0    omeprazole (priLOSEC) 40 MG capsule TAKE 1 CAPSULE BY MOUTH DAILY FOR HEARTBURN 90 capsule 0     No facility-administered medications prior to visit.     No opioid medication identified on active medication list. I have reviewed chart for other potential  high risk medication/s and harmful drug interactions in the elderly.      Aspirin is not on active medication list.   "Aspirin use is not indicated based on review of current medical condition/s. Risk of harm outweighs potential benefits.  .    Patient Active Problem List   Diagnosis    Hypertension    Gastroesophageal reflux disease    Dermatitis    Bilateral impacted cerumen    Dyslipidemia    Depression    Poor sleep pattern    Arthritis    Abnormal EKG    Hypertensive heart disease without heart failure     Advance Care Planning Advance Directive is not on file.  ACP discussion was held with the patient during this visit. Patient does not have an advance directive, information provided.            Objective   Vitals:    02/10/25 1040   BP: 124/80   BP Location: Right arm   Patient Position: Sitting   Cuff Size: Adult   Pulse: 77   Temp: 98.3 °F (36.8 °C)   TempSrc: Oral   SpO2: 100%   Weight: 78.1 kg (172 lb 3.2 oz)   Height: 154.9 cm (60.98\")   PainSc: 0-No pain       Estimated body mass index is 32.56 kg/m² as calculated from the following:    Height as of this encounter: 154.9 cm (60.98\").    Weight as of this encounter: 78.1 kg (172 lb 3.2 oz).    BMI is >= 30 and <35. (Class 1 Obesity). The following options were offered after discussion;: exercise counseling/recommendations and nutrition counseling/recommendations    Gait and Balance Evaluation:  Normal         Does the patient have evidence of cognitive impairment? No                                                                                               Health  Risk Assessment    Smoking Status:  Social History     Tobacco Use   Smoking Status Never    Passive exposure: Never   Smokeless Tobacco Never     Alcohol Consumption:  Social History     Substance and Sexual Activity   Alcohol Use Not Currently       Fall Risk Screen  STEADI Fall Risk Assessment was completed, and patient is at LOW risk for falls.Assessment completed on:2/10/2025    Depression Screening   Little interest or pleasure in doing things? Not at all   Feeling down, depressed, or hopeless? Not at " all   PHQ-2 Total Score 0      Health Habits and Functional and Cognitive Screenin/10/2025    10:46 AM   Functional & Cognitive Status   Do you have difficulty preparing food and eating? No   Do you have difficulty bathing yourself, getting dressed or grooming yourself? No   Do you have difficulty using the toilet? No   Do you have difficulty moving around from place to place? No   Do you have trouble with steps or getting out of a bed or a chair? No   Current Diet Well Balanced Diet   Dental Exam Not up to date   Eye Exam Up to date   Exercise (times per week) 4 times per week   Current Exercises Include Walking;House Cleaning   Do you need help using the phone?  No   Are you deaf or do you have serious difficulty hearing?  No   Do you need help to go to places out of walking distance? No   Do you need help shopping? No   Do you need help preparing meals?  No   Do you need help with housework?  No   Do you need help with laundry? No   Do you need help taking your medications? No   Do you need help managing money? No   Do you ever drive or ride in a car without wearing a seat belt? No   Have you felt unusual stress, anger or loneliness in the last month? Yes   Who do you live with? Other   If you need help, do you have trouble finding someone available to you? No   Have you been bothered in the last four weeks by sexual problems? No   Do you have difficulty concentrating, remembering or making decisions? No           Age-appropriate Screening Schedule:  Refer to the list below for future screening recommendations based on patient's age, sex and/or medical conditions. Orders for these recommended tests are listed in the plan section. The patient has been provided with a written plan.    Health Maintenance List  Health Maintenance   Topic Date Due    COLORECTAL CANCER SCREENING  Never done    TDAP/TD VACCINES (1 - Tdap) Never done    ZOSTER VACCINE (1 of 2) Never done    HEPATITIS C SCREENING  Never done     ANNUAL WELLNESS VISIT  Never done    COVID-19 Vaccine (1 - 2024-25 season) Never done    BMI FOLLOWUP  01/22/2025    DXA SCAN  05/02/2026    MAMMOGRAM  06/19/2026    INFLUENZA VACCINE  Completed    Pneumococcal Vaccine 65+  Completed                                                                                                                                                CMS Preventative Services Quick Reference  Risk Factors Identified During Encounter  Depression/Dysphoria:  Is getting established with new counselor.  Immunizations Discussed/Encouraged: Tdap, Prevnar 20 (Pneumococcal 20-valent conjugate), Shingrix, and COVID19  Inactivity/Sedentary: Patient was advised to exercise at least 150 minutes a week per CDC recommendations. and Patient was advised to do at least 150 minutes a week of moderate intensity activity such as brisk walking and do at least 2 days a week of activities that strengthen muscles such as resistance training and do activities to improve balance such as standing on one foot about 3 days a week.  Dental Screening Recommended  Vision Screening Recommended    The above risks/problems have been discussed with the patient.  Pertinent information has been shared with the patient in the After Visit Summary.  An After Visit Summary and PPPS were made available to the patient.    Follow Up:   Next Medicare Wellness visit to be scheduled in 1 year.     Assessment & Plan  Medicare annual wellness visit, subsequent  Counseled on recommended vaccinations.  Blood work ordered today.  Encouraged to update colon cancer screening.  Mammogram scheduled for this week.  Orders:    CBC (No Diff); Future    Lipid Panel; Future    Hemoglobin A1c; Future    Comprehensive Metabolic Panel; Future    Urinalysis With Microscopic If Indicated (No Culture) - Urine, Clean Catch; Future    TSH Rfx On Abnormal To Free T4; Future    Hepatitis C Antibody; Future    Cologuard - Stool, Per Rectum; Future    Pneumococcal  Conjugate Vaccine 20-Valent (PCV20)    Colon cancer screening    Orders:    Cologuard - Stool, Per Rectum; Future    Immunization due    Orders:    Pneumococcal Conjugate Vaccine 20-Valent (PCV20)    Dyslipidemia    Orders:    Lipid Panel; Future    Primary hypertension  Hypertension is stable and controlled  Continue current treatment regimen.  Dietary sodium restriction.  Regular aerobic exercise.  Ambulatory blood pressure monitoring.                Follow Up:   Return in 4 months (on 6/10/2025), or if symptoms worsen or fail to improve, for Recheck.

## 2025-02-12 ENCOUNTER — TELEPHONE (OUTPATIENT)
Dept: FAMILY MEDICINE CLINIC | Facility: CLINIC | Age: 70
End: 2025-02-12
Payer: MEDICARE

## 2025-02-12 NOTE — TELEPHONE ENCOUNTER
Called patient back, (please see result note)  Went over lab results and recommendations again. No further questions.    
Caller: Falguni Laureano    Relationship: Self    Best call back number: [phone]     What test was performed: LABS    When was the test performed: 2/10/25    Where was the test performed: [facility]  
[FreeTextEntry8] : cc: back pain  and burning  left LE \par Pt s/p fall 2017 on the steps since then she c/o  left  LE side burning sensation, + back pain, no N,V<C<D.no BM or urination changes. Patient denies fever, no abd pain, + left hip pin,

## 2025-02-19 ENCOUNTER — TELEPHONE (OUTPATIENT)
Dept: FAMILY MEDICINE CLINIC | Facility: CLINIC | Age: 70
End: 2025-02-19
Payer: MEDICARE

## 2025-02-19 NOTE — TELEPHONE ENCOUNTER
----- Message from Fiona Fitch sent at 2/19/2025  7:47 AM EST -----    [facility] has sent message that her specimen was not able to be processed. They should be sending her a new sample collection kit. Please let me know if you do not hear from them.     Unable to reach patient.    Nino rangel.

## 2025-02-19 NOTE — TELEPHONE ENCOUNTER
Name: Sridevi Falguni JOSE DAVID      Relationship: Self      Best Callback Number: [phone]       [facility]UB PROVIDED THE RELAY MESSAGE FROM THE OFFICE      PATIENT: VOICED UNDERSTANDING AND HAS NO FURTHER QUESTIONS AT THIS TIME    ADDITIONAL INFORMATION:

## 2025-02-23 DIAGNOSIS — K21.9 GASTROESOPHAGEAL REFLUX DISEASE, UNSPECIFIED WHETHER ESOPHAGITIS PRESENT: ICD-10-CM

## 2025-02-24 RX ORDER — OMEPRAZOLE 40 MG/1
40 CAPSULE, DELAYED RELEASE ORAL DAILY
Qty: 90 CAPSULE | Refills: 0 | Status: SHIPPED | OUTPATIENT
Start: 2025-02-24

## 2025-02-24 NOTE — TELEPHONE ENCOUNTER
Rx Refill Note  Requested Prescriptions     Pending Prescriptions Disp Refills    omeprazole (priLOSEC) 40 MG capsule [Pharmacy Med Name: OMEPRAZOLE 40MG CAPSULES] 90 capsule 0     Sig: TAKE 1 CAPSULE BY MOUTH DAILY FOR HEARTBURN      Last office visit with prescribing clinician: 2/10/2025   Last telemedicine visit with prescribing clinician: Visit date not found   Next office visit with prescribing clinician: 6/9/2025                         Would you like a call back once the refill request has been completed: [] Yes [] No    If the office needs to give you a call back, can they leave a voicemail: [] Yes [] No    Suyapa Gasca MA  02/24/25, 14:16 EST

## 2025-03-13 ENCOUNTER — HOSPITAL ENCOUNTER (OUTPATIENT)
Dept: MAMMOGRAPHY | Facility: HOSPITAL | Age: 70
Discharge: HOME | End: 2025-03-13
Payer: MEDICARE

## 2025-03-13 ENCOUNTER — TELEPHONE (OUTPATIENT)
Dept: MAMMOGRAPHY | Facility: HOSPITAL | Age: 70
End: 2025-03-13
Payer: MEDICARE

## 2025-03-13 ENCOUNTER — HOSPITAL ENCOUNTER (OUTPATIENT)
Dept: ULTRASOUND IMAGING | Facility: HOSPITAL | Age: 70
Discharge: HOME | End: 2025-03-13
Payer: MEDICARE

## 2025-03-13 DIAGNOSIS — R92.8 ABNORMAL MAMMOGRAM: ICD-10-CM

## 2025-03-13 PROCEDURE — 77065 DX MAMMO INCL CAD UNI: CPT

## 2025-03-13 PROCEDURE — 76642 ULTRASOUND BREAST LIMITED: CPT

## 2025-03-13 PROCEDURE — G0279 TOMOSYNTHESIS, MAMMO: HCPCS

## 2025-03-13 NOTE — TELEPHONE ENCOUNTER
Patient notified of surgical consult appointment with Dr ANTONIO Dalal on Tuesday 4/15/25 @ 13:45 with arrival time of 13:15 @ [address]. Patient given office contact & location information. Patient notified to bring photo ID, insurance information, list of prescription & OTC medications. Patient may be accompanied by family member or friend for support. Patient verbalized understanding.

## 2025-03-31 NOTE — PROGRESS NOTES
Office Note     Name: Falguni Laureano    : 1955     MRN: [id number]     Chief Complaint  Pre-op Exam    Subjective     History of Present Illness:  Falguni Laureano is a 70 y.o. female who presents today for preoperative appointment.  Past medical history includes hypertension, dyslipidemia and indigestion.    Patient had an abnormal mammogram last summer and underwent a biopsy which was normal did not show any signs of malignancy.  She then had repeat diagnostic mammogram with ultrasound.  Patient states they were concerned they did not get all of the concerning tissue, so she is going to meet with a general surgeon to have another biopsy on 4/15.  She made the appointment today to discuss this and notify her PCP.  She denies any breast changes, discharge or pain today.  States she is overall feeling well.  She states that she made this appointment and she does not have any labs or requirements she needs to have done, but just wanted to discuss results.    Had ordered a Cologuard test for her to have completed which she has had done before per her report.  She states it was normal in the past.  She states she has had difficult time getting this tested.  She has had 2 Cologuard test sent back saying that they were not processed.  She states she has not had another sample last week and was told that it was collected by [employer], but Colog[facility] called her again to tell her that they did not receive it.  Any changes to bowel movements or blood in her stool or abdominal pain.  She has never had a colonoscopy before.    Previously patient had a positive hepatitis C antibody.  She states she has a history of hepatitis C and underwent treatment for many years ago.  She is not sure what she was treated with.  Recently had positive antibody test, but had not return for recheck.      Patient on Omeprazole daily for GERD. She has been on this for many years. She states she has a history of gastritis. Denies abdominal  pain, nausea or vomiting. Denies any recent falls. DEXA shows evidence of osteoporosis of lumbar spine and osteopenia of proximal left forearm.   Was referred to [facility] but never established.         Review of Systems:   Review of Systems   Constitutional:  Negative for chills and fever.   Respiratory:  Negative for chest tightness and shortness of breath.    Cardiovascular:  Negative for chest pain and palpitations.   Gastrointestinal:  Negative for abdominal pain and blood in stool.   Genitourinary:  Negative for breast discharge and breast pain.   Neurological:  Negative for dizziness and light-headedness.       Past Medical History:   Past Medical History:   Diagnosis Date    Hypertension        Past Surgical History:   Past Surgical History:   Procedure Laterality Date    HIP FRACTURE SURGERY  2020       Family History:   Family History   Problem Relation Age of Onset    No Known Problems Mother     No Known Problems Father     No Known Problems Daughter     No Known Problems Maternal Grandmother     No Known Problems Paternal Grandmother     No Known Problems Maternal Aunt     Breast cancer Paternal Aunt     No Known Problems Maternal Grandfather     No Known Problems Paternal Grandfather     No Known Problems Maternal Uncle     No Known Problems Paternal Uncle     Ovarian cancer Neg Hx        Social History:   Social History     Socioeconomic History    Marital status:    Tobacco Use    Smoking status: Never     Passive exposure: Never    Smokeless tobacco: Never   Vaping Use    Vaping status: Never Used   Substance and Sexual Activity    Alcohol use: Not Currently    Drug use: Never    Sexual activity: Defer       Immunizations:   Immunization History   Administered Date(s) Administered    Fluzone High-Dose 65+YRS 11/22/2024    Pneumococcal Conjugate 20-Valent (PCV20) 02/10/2025        Medications:     Current Outpatient Medications:     acetaminophen (TYLENOL) 500 MG tablet, Take 1 tablet  "by mouth Every 6 (Six) Hours As Needed for Mild Pain. Indications: Pain, Disp: , Rfl:     amLODIPine (NORVASC) 10 MG tablet, Take 1 tablet by mouth Daily., Disp: 90 tablet, Rfl: 1    atorvastatin (LIPITOR) 10 MG tablet, TAKE 1 TABLET BY MOUTH EVERY NIGHT, Disp: 90 tablet, Rfl: 3    calcium carbonate (OS-EVELIO) 600 MG tablet, Take 1 tablet by mouth Daily. Indications: Low Amount of Calcium in the Blood, Disp: , Rfl:     lisinopril (PRINIVIL,ZESTRIL) 40 MG tablet, Take 1 tablet by mouth Daily., Disp: 90 tablet, Rfl: 1    metoprolol tartrate (LOPRESSOR) 100 MG tablet, TAKE 1 TABLET BY MOUTH TWICE DAILY, Disp: 180 tablet, Rfl: 0    omeprazole (priLOSEC) 40 MG capsule, TAKE 1 CAPSULE BY MOUTH DAILY FOR HEARTBURN, Disp: 90 capsule, Rfl: 0    Allergies:   No Known Allergies    Objective     Vital Signs  /84 (BP Location: Right arm, Patient Position: Sitting, Cuff Size: Adult)   Pulse 84   Temp 97.5 °F (36.4 °C) (Temporal)   Ht 154.9 cm (60.98\")   Wt 78.8 kg (173 lb 12.8 oz)   SpO2 98%   BMI 32.86 kg/m²   Estimated body mass index is 32.86 kg/m² as calculated from the following:    Height as of this encounter: 154.9 cm (60.98\").    Weight as of this encounter: 78.8 kg (173 lb 12.8 oz).           Physical Exam  Vitals reviewed.   Constitutional:       General: She is not in acute distress.     Appearance: Normal appearance.   HENT:      Head: Normocephalic and atraumatic.   Eyes:      Extraocular Movements: Extraocular movements intact.      Pupils: Pupils are equal, round, and reactive to light.   Cardiovascular:      Rate and Rhythm: Normal rate and regular rhythm.      Pulses: Normal pulses.      Heart sounds: Normal heart sounds.   Pulmonary:      Effort: Pulmonary effort is normal.      Breath sounds: Normal breath sounds.   Abdominal:      General: Abdomen is flat. Bowel sounds are normal.   Skin:     General: Skin is warm.   Neurological:      General: No focal deficit present.      Mental Status: She is " alert and oriented to person, place, and time.   Psychiatric:         Mood and Affect: Mood normal.          Results:  No results found for this or any previous visit (from the past 24 hours).     Assessment and Plan     Assessment/Plan:  Diagnoses and all orders for this visit:    1. Abnormal mammogram of left breast (Primary)    2. Colon cancer screening  -     Ambulatory Referral For Screening Colonoscopy    3. Hx of hepatitis C    4. Gastroesophageal reflux disease, unspecified whether esophagitis present    Reviewed recent mammogram imaging and recommendations. Counseled on the importance of follow up with breast imaging center and the general surgery office on 4/15 for consult for biopsy.     Due to complications with obtaining the Cologuard test, will refer for screening colonoscopy.     Patient to have labs previously ordered redrawn today to check status of hepatitis c infection.     Plan of care reviewed with the patient at the conclusion of today's visit.  Education was provided regarding diagnosis and management.  Patient verbalizes understanding of and agreement with plan.     Patient continues on Omeprazole daily for GERD. Patient counseled on risk of worsening osteoporosis with long term use of PPI.         Follow Up  Return for Next scheduled follow up.    Fiona Fitch PA-C   [facility]

## 2025-03-31 NOTE — TELEPHONE ENCOUNTER
Rx Refill Note  Requested Prescriptions     Pending Prescriptions Disp Refills    amLODIPine (NORVASC) 10 MG tablet [Pharmacy Med Name: AMLODIPINE BESYLATE 10MG TABLETS] 90 tablet 1     Sig: TAKE 1 TABLET BY MOUTH DAILY      Last office visit with prescribing clinician: 3/31/2025   Last telemedicine visit with prescribing clinician: Visit date not found   Next office visit with prescribing clinician: 6/9/2025                         Would you like a call back once the refill request has been completed: [] Yes [] No    If the office needs to give you a call back, can they leave a voicemail: [] Yes [] No    Suyapa Gasca MA  03/31/25, 12:22 EDT

## 2025-04-06 PROBLEM — I21.02 STEMI INVOLVING LEFT ANTERIOR DESCENDING CORONARY ARTERY: Status: ACTIVE | Noted: 2025-01-01

## 2025-04-06 PROBLEM — N17.9 ACUTE KIDNEY INJURY: Status: ACTIVE | Noted: 2025-01-01

## 2025-04-06 PROBLEM — I46.9 CARDIAC ARREST: Status: ACTIVE | Noted: 2025-01-01

## 2025-04-06 PROBLEM — R57.0 CARDIOGENIC SHOCK: Status: ACTIVE | Noted: 2025-01-01

## 2025-04-06 PROBLEM — E87.20 METABOLIC ACIDOSIS: Status: ACTIVE | Noted: 2025-01-01

## 2025-04-06 PROBLEM — J96.01 ACUTE RESPIRATORY FAILURE WITH HYPOXIA: Status: ACTIVE | Noted: 2025-01-01

## 2025-04-06 NOTE — H&P
[facility] Cardiology  Consultation note    Subjective:     Patient ID: Falguni Laureano is a 70 y.o. female.  Referring provider: No ref. provider found     Reason for consultation:   Chief Complaint   Patient presents with    Cardiac Arrest        Problem List:  PEA arrest  Hypertensive Heart Disease  Echo, 09/18/2024: EF 60%. LVDF consistent with (grade 1) impaired relaxation. Trace MR. Mild TR with normal RVSP.   MPS, 09/18/2024: EF 68%. No evidence of ischemia. Negative for angina or diagnostic ST segment changes.   Dyslipidemia   Abnormal EKG  Echo, 09/18/2024: EF 60%. LVDF consistent with (grade 1) impaired relaxation. Trace MR. Mild TR with normal RVSP.   MPS, 09/18/2024: EF 68%. No evidence of ischemia. Negative for angina or diagnostic ST segment changes.   EKG consistent with hypertensive heart disease  GERD  Status post left hip replacement         No Known Allergies      Current Facility-Administered Medications:     sodium chloride 0.9 % flush 10 mL, 10 mL, Intravenous, PRN, Caleb Najera,     Current Outpatient Medications:     acetaminophen (TYLENOL) 500 MG tablet, Take 1 tablet by mouth Every 6 (Six) Hours As Needed for Mild Pain. Indications: Pain, Disp: , Rfl:     amLODIPine (NORVASC) 10 MG tablet, TAKE 1 TABLET BY MOUTH DAILY, Disp: 90 tablet, Rfl: 1    atorvastatin (LIPITOR) 10 MG tablet, TAKE 1 TABLET BY MOUTH EVERY NIGHT, Disp: 90 tablet, Rfl: 3    calcium carbonate (OS-EVELIO) 600 MG tablet, Take 1 tablet by mouth Daily. Indications: Low Amount of Calcium in the Blood, Disp: , Rfl:     lisinopril (PRINIVIL,ZESTRIL) 40 MG tablet, Take 1 tablet by mouth Daily., Disp: 90 tablet, Rfl: 1    metoprolol tartrate (LOPRESSOR) 100 MG tablet, TAKE 1 TABLET BY MOUTH TWICE DAILY, Disp: 180 tablet, Rfl: 0    omeprazole (priLOSEC) 40 MG capsule, TAKE 1 CAPSULE BY MOUTH DAILY FOR HEARTBURN, Disp: 90 capsule, Rfl: 0    HPI:      Falguni Laureano is a 70 y.o. female who  presented to the emergency room today complaining of dyspnea.  She then had a PEA arrest and is now intubated.  Her EKG showed acute anterolateral ST elevation.    Cardiac Risk Factors:  The following portions of the patient's history were reviewed and updated as appropriate: allergies, current medications and problem list.    Social History     Socioeconomic History    Marital status:    Tobacco Use    Smoking status: Never     Passive exposure: Never    Smokeless tobacco: Never   Vaping Use    Vaping status: Never Used   Substance and Sexual Activity    Alcohol use: Not Currently    Drug use: Never    Sexual activity: Defer     Family History   Problem Relation Age of Onset    No Known Problems Mother     No Known Problems Father     No Known Problems Daughter     No Known Problems Maternal Grandmother     No Known Problems Paternal Grandmother     No Known Problems Maternal Aunt     Breast cancer Paternal Aunt     No Known Problems Maternal Grandfather     No Known Problems Paternal Grandfather     No Known Problems Maternal Uncle     No Known Problems Paternal Uncle     Ovarian cancer Neg Hx        Review of Systems: Unobtainable as the patient is intubated       Objective:     Vitals:    04/06/25 1152   BP: 92/74   Pulse:        GENERAL: Intubated and sedated  HEENT: Sclera anicteric. Mucous membranes are pink and moist.   LUNGS: Bilateral wheezing noted.   CARDIOVASCULAR: The heart has a regular rate with a normal S1 and S2. There is no murmur, gallop, rub, or click appreciated.   ABDOMEN: Soft   MUSCULOSKELETAL: There are no obvious bony abnormalities. Normal range of tenderness to palpation.   NEUROLOGICAL: Intubated and sedated  PERIPHERAL VASCULAR: 1+ pedal pulses        Diagnostic Data (reviewed):    Lab Results   Component Value Date    GLUCOSE 92 03/31/2025    BUN 11 03/31/2025    CREATININE 1.02 (H) 03/31/2025    BCR 10.8 03/31/2025    K 4.6 03/31/2025    CO2 26.0 03/31/2025    CALCIUM 9.5  03/31/2025    ALBUMIN 4.2 03/31/2025    ALKPHOS 98 03/31/2025    AST 21 03/31/2025    ALT 14 03/31/2025      Lab Results   Component Value Date    WBC 5.53 02/10/2025    RBC 5.13 02/10/2025    HGB 14.2 02/10/2025    HCT 42.4 02/10/2025    MCV 82.7 02/10/2025    MCH 27.7 02/10/2025     02/10/2025      Lab Results   Component Value Date    CHOL 143 02/10/2025    TRIG 173 (H) 02/10/2025    HDL 35 (L) 02/10/2025    LDL 78 02/10/2025      Lab Results   Component Value Date    HGBA1C 5.50 02/10/2025          Procedures        Assessment:     PEA arrest in the emergency room following complaints of dyspnea with associated acute anterolateral ST elevation.  Hypertension  Hyperlipidemia        Plan:     Emergent left heart catheterization with possible intervention    Ya Finnegan MD, FACC

## 2025-04-06 NOTE — PROGRESS NOTES
Pharmacy to Dose Heparin Infusion Note    Falguni Laureano is a 70 y.o. female receiving heparin infusion.     Therapy for (VTE/Cardiac): Cardiac  Patient Weight: 79.4 kg  Initial Bolus (Y/N): Yes  Any Bolus (Y/N): Yes     Signs or Symptoms of Bleeding: N/A    Cardiac or Other (Not VTE)   Initial Bolus: 60 units/kg (Max 4,000 units)  Initial rate: 12 units/kg/hr (Max 1,000 units/hr)   Anti-Xa Bolus   Dose Infusion Hold   Time Infusion Rate Change (units/kg/hr) Repeat  Anti-Xa    < 0.11 50 units/kg  (4000 units Max) None Increase by  3 units/kg/hr 6 hours   0.11- 0.19 25 units/kg  (2000 units Max) None Increase by  2 units/kg/hr 6 hours   0.2 - 0.29 0 None Increase by  1 units/kg/hr 6 hours   0.3 - 0.5 0 None No Change 6 hours (after 2 consecutive levels in range check qAM)   0.51 - 0.6 0 None Decrease by  1 units/kg/hr 6 hours   0.61 - 0.8 0 30 minutes Decrease by  2 units/kg/hr 6 hours   0.81 - 1 0 60 minutes Decrease by  3 units/kg/hr 6 hours   >1 0 Hold  After Anti-Xa less than 0.5 decrease previous rate by  4 units/kg/hr  Every 2 hours until Anti-Xa  less than 0.5 then when infusion restarts in 6 hours     Results from last 7 days   Lab Units 04/06/25  1623 04/06/25  1148 04/06/25  1144   INR  1.48*  --  1.13*   HEMOGLOBIN g/dL 11.6*  --  14.6   HEMOGLOBIN, POC g/dL  --  15.6  --    HEMATOCRIT % 33.9*  --  46.0   HEMATOCRIT POC %  --  46  --    PLATELETS 10*3/mm3 207  --  217       Date   Time   Anti-Xa Current Rate (units/kg/hr) Bolus   (units) Rate Change   (units/kg/hr) New Rate (units/kg/hr) Repeat  Anti-Xa Comments /  Pump Check    4/6 1722 0.59 NEW -- +12 12 0000 DW RN; deferring initial bolus, 10,000 units received in cath lab, initial Xa 0.59                                                                                                                                                                                                                                 Mitchell Fields, AlanaD  4/6/2025  17:17  EDT

## 2025-04-06 NOTE — Clinical Note
IABP inserted in the right groin. suture, stat-lock and transparent dressing used to secure. IABP verified with fluoroscopy.Ratio: 1:1.Augmented pressure (mmHg): 89.

## 2025-04-06 NOTE — ED PROVIDER NOTES
[facility]    EMERGENCY DEPARTMENT ENCOUNTER      Pt Name: Falguni Laureano  MRN: [id number]  YOB: 1955  Date of evaluation: 4/6/2025  Provider: Caleb Najera DO    CHIEF COMPLAINT       Chief Complaint   Patient presents with    Cardiac Arrest         HISTORY OF PRESENT ILLNESS  (Location/Symptom, Timing/Onset, Context/Setting, Quality, Duration, Modifying Factors, Severity.)   Falguni Laureano is a 70 y.o. female who presents to the emergency department via EMS as a code as the patient was found to be in cardiopulmonary arrest upon the engine arrival.  The grandson who provides additional history after arrival to the ED notes the patient been complaining of some acid reflux since yesterday afternoon, thought she had some reflux and some mild upper abdominal discomfort, been put off, to the hospital, he checked on her again earlier today he states she does not look well was going to bring her to the hospital but they went out to get into the vehicle for transportation she collapsed into a light post went unresponsive, he called 911 at that time.  EMS notes the patient was found to be in PEA arrest, she was started on resuscitative efforts, was given 2 rounds of epinephrine, she was intubated and route.  CPR in Routes and continuous upon arrival to the ED.  Kristin notes she does have a history of hypertension, osteoarthritis.      Nursing notes were reviewed.      PAST MEDICAL HISTORY     Past Medical History:   Diagnosis Date    Hypertension          SURGICAL HISTORY       Past Surgical History:   Procedure Laterality Date    HIP FRACTURE SURGERY  2020         CURRENT MEDICATIONS       Current Facility-Administered Medications:     EPINEPHrine 10 mg in 250 mL infusion, 0.02-0.3 mcg/kg/min, Intravenous, Titrated, Caleb Najera DO, Last Rate: 14.29 mL/hr at 04/06/25 1217, 0.12 mcg/kg/min at 04/06/25 1217    sodium chloride 0.9 % flush 10 mL, 10 mL, Intravenous, PRN, Caleb Najera,  DO    Current Outpatient Medications:     acetaminophen (TYLENOL) 500 MG tablet, Take 1 tablet by mouth Every 6 (Six) Hours As Needed for Mild Pain. Indications: Pain, Disp: , Rfl:     amLODIPine (NORVASC) 10 MG tablet, TAKE 1 TABLET BY MOUTH DAILY, Disp: 90 tablet, Rfl: 1    atorvastatin (LIPITOR) 10 MG tablet, TAKE 1 TABLET BY MOUTH EVERY NIGHT, Disp: 90 tablet, Rfl: 3    calcium carbonate (OS-EVELIO) 600 MG tablet, Take 1 tablet by mouth Daily. Indications: Low Amount of Calcium in the Blood, Disp: , Rfl:     lisinopril (PRINIVIL,ZESTRIL) 40 MG tablet, Take 1 tablet by mouth Daily., Disp: 90 tablet, Rfl: 1    metoprolol tartrate (LOPRESSOR) 100 MG tablet, TAKE 1 TABLET BY MOUTH TWICE DAILY, Disp: 180 tablet, Rfl: 0    omeprazole (priLOSEC) 40 MG capsule, TAKE 1 CAPSULE BY MOUTH DAILY FOR HEARTBURN, Disp: 90 capsule, Rfl: 0    ALLERGIES     Patient has no known allergies.    FAMILY HISTORY       Family History   Problem Relation Age of Onset    No Known Problems Mother     No Known Problems Father     No Known Problems Daughter     No Known Problems Maternal Grandmother     No Known Problems Paternal Grandmother     No Known Problems Maternal Aunt     Breast cancer Paternal Aunt     No Known Problems Maternal Grandfather     No Known Problems Paternal Grandfather     No Known Problems Maternal Uncle     No Known Problems Paternal Uncle     Ovarian cancer Neg Hx           SOCIAL HISTORY       Social History     Socioeconomic History    Marital status:    Tobacco Use    Smoking status: Never     Passive exposure: Never    Smokeless tobacco: Never   Vaping Use    Vaping status: Never Used   Substance and Sexual Activity    Alcohol use: Not Currently    Drug use: Never    Sexual activity: Defer         PHYSICAL EXAM       Vitals:    04/06/25 1200 04/06/25 1207 04/06/25 1215 04/06/25 1216   BP:   (!) 88/65 (!) 83/40   Pulse: 112      Resp:  14     Temp:       TempSrc:       SpO2: 100%      Weight:       Height:            Physical Exam  General : Patient is unresponsive, intubated, CPR in progress upon arrival, no purposeful movement  HEENT: Pupils are equally round, pupils are 4 mm, millimeter reactive, eyes are glazed over  Neck: Neck is supple, full range of motion, trachea midline  Cardiac: Heart tachycardic rate with regular rhythm  Lungs: Lungs mechanically ventilated, patient intubated  Abdomen: Abdomen is soft, nondistended  Musculoskeletal: No purposeful movement  Neuro: No partial movement noted, eyes are nonreactive        DIAGNOSTIC RESULTS     EKG:  All EKGs are interpreted by the Emergency Department Physician who either signs or Co-signs this chart in the absence of a cardiologist.    ECG 12 Lead Rhythm Change   Final Result   Test Reason : Rhythm Change   Blood Pressure :   */*   mmHG   Vent. Rate : 144 BPM     Atrial Rate : 144 BPM      P-R Int : 132 ms          QRS Dur : 110 ms       QT Int : 276 ms       P-R-T Axes :  39 -84  73 degrees     QTcB Int : 427 ms      Sinus tachycardia   Left axis deviation   Incomplete right bundle branch block   Inferior infarct Anterolateral infarct ** ** ACUTE MI / STEMI ** **   Abnormal ECG   When compared with ECG of 25-Jun-2024 09:45,   Significant changes have occurred   Confirmed by ZONIA WATTS MD ([id number]) on 4/6/2025 11:56:42 AM      Referred By: ed md           Confirmed By: ZONIA WATTS MD      ECG 12 Lead Chest Pain    (Results Pending)       RADIOLOGY:     [x] Radiologist's Report Reviewed:  XR Chest 1 View    (Results Pending)           LABS:    I have reviewed and interpreted all of the currently available lab results from this visit (if applicable):  Results for orders placed or performed during the hospital encounter of 04/06/25   High Sensitivity Troponin T    Collection Time: 04/06/25 11:44 AM    Specimen: Blood   Result Value Ref Range    HS Troponin T 5,252 (C) <14 ng/L   Magnesium    Collection Time: 04/06/25 11:44 AM    Specimen: Blood   Result Value  Ref Range    Magnesium 2.4 1.6 - 2.4 mg/dL   Protime-INR    Collection Time: 04/06/25 11:44 AM    Specimen: Blood   Result Value Ref Range    Protime 15.2 12.2 - 15.3 Seconds    INR 1.13 (H) 0.89 - 1.12   Heparin Anti-Xa    Collection Time: 04/06/25 11:44 AM    Specimen: Blood   Result Value Ref Range    Heparin Anti-Xa (UFH) 0.10 (L) 0.30 - 0.70 IU/ml   aPTT    Collection Time: 04/06/25 11:44 AM    Specimen: Blood   Result Value Ref Range    PTT 30.2 (L) 60.0 - 90.0 seconds   CBC Auto Differential    Collection Time: 04/06/25 11:44 AM    Specimen: Blood   Result Value Ref Range    WBC 8.05 3.40 - 10.80 10*3/mm3    RBC 5.26 3.77 - 5.28 10*6/mm3    Hemoglobin 14.6 12.0 - 15.9 g/dL    Hematocrit 46.0 34.0 - 46.6 %    MCV 87.5 79.0 - 97.0 fL    MCH 27.8 26.6 - 33.0 pg    MCHC 31.7 31.5 - 35.7 g/dL    RDW 13.1 12.3 - 15.4 %    RDW-SD 41.7 37.0 - 54.0 fl    MPV 9.3 6.0 - 12.0 fL    Platelets 217 140 - 450 10*3/mm3    Neutrophil % 36.3 (L) 42.7 - 76.0 %    Lymphocyte % 52.7 (H) 19.6 - 45.3 %    Monocyte % 4.7 (L) 5.0 - 12.0 %    Eosinophil % 2.5 0.3 - 6.2 %    Basophil % 0.6 0.0 - 1.5 %    Immature Grans % 3.2 (H) 0.0 - 0.5 %    Neutrophils, Absolute 2.92 1.70 - 7.00 10*3/mm3    Lymphocytes, Absolute 4.24 (H) 0.70 - 3.10 10*3/mm3    Monocytes, Absolute 0.38 0.10 - 0.90 10*3/mm3    Eosinophils, Absolute 0.20 0.00 - 0.40 10*3/mm3    Basophils, Absolute 0.05 0.00 - 0.20 10*3/mm3    Immature Grans, Absolute 0.26 (H) 0.00 - 0.05 10*3/mm3    nRBC 1.2 (H) 0.0 - 0.2 /100 WBC   Green Top (Gel)    Collection Time: 04/06/25 11:44 AM   Result Value Ref Range    Extra Tube Hold for add-ons.    Lavender Top    Collection Time: 04/06/25 11:44 AM   Result Value Ref Range    Extra Tube hold for add-on    Gold Top - SST    Collection Time: 04/06/25 11:44 AM   Result Value Ref Range    Extra Tube Hold for add-ons.    Gray Top    Collection Time: 04/06/25 11:44 AM   Result Value Ref Range    Extra Tube Hold for add-ons.    Light Blue Top     Collection Time: 04/06/25 11:44 AM   Result Value Ref Range    Extra Tube Hold for add-ons.    ECG 12 Lead Rhythm Change    Collection Time: 04/06/25 11:45 AM   Result Value Ref Range    QT Interval 276 ms    QTC Interval 427 ms   POC CHEM 8    Collection Time: 04/06/25 11:48 AM    Specimen: Blood   Result Value Ref Range    Glucose 150 (H) 70 - 130 mg/dL    BUN 19 8 - 26 mg/dL    Creatinine 1.50 (H) 0.60 - 1.30 mg/dL    Sodium 140 138 - 146 mmol/L    POC Potassium 3.3 (L) 3.5 - 4.9 mmol/L    Chloride 105 98 - 109 mmol/L    Total CO2 20 (L) 24 - 29 mmol/L    Hemoglobin 15.6 12.0 - 17.0 g/dL    Hematocrit 46 38 - 51 %    Ionized Calcium 0.98 (L) 1.20 - 1.32 mmol/L    eGFR 37.1 (L) >60.0 mL/min/1.73        If labs were ordered, I independently reviewed the results and considered them in treating the patient.      EMERGENCY DEPARTMENT COURSE and DIFFERENTIAL DIAGNOSIS/MDM:   Vitals:  AS OF 12:18 EDT    BP - (!) 83/40  HR - 112  TEMP - (!) 89.6 °F (32 °C) (Rectal)  O2 SATS - 100%      Orders placed during this visit:  Orders Placed This Encounter   Procedures    XR Chest 1 View    Trevett Draw    Trevett Draw    High Sensitivity Troponin T    Magnesium    Protime-INR    Heparin Anti-Xa    aPTT    CBC Auto Differential    High Sensitivity Troponin T 1Hr    NPO Diet NPO Type: Strict NPO    Initiate Department's Acute STEMI Process    Undress and Gown    Continuous Pulse Oximetry    Vital Signs    Notify Provider Platelet Count Less Than [id number]    Stop Infusion & Notify Provider if Bleeding Occurs    Consult Interventional Cardiology and Notify Cath Lab    Oxygen Therapy- Nasal Cannula; Titrate 1-6 LPM Per SpO2; 90 - 95%    POCT CHEM 8    POC CHEM 8    POC CHEM 8    ECG 12 Lead Rhythm Change    ECG 12 Lead Chest Pain    Insert peripheral IV    Insert Peripheral IV - Avoid RIGHT Radial (If Possible)    Insert 2nd Peripheral IV - Avoid RIGHT Radial (If Possible)    Green Top (Gel)    Lavender Top    Gold Top - SST    Gray Top     Light Blue Top    CBC & Differential    Green Top (Gel)    Lavender Top    Gold Top - SST    Gray Top    Light Blue Top    CBC & Differential       All labs have been independently reviewed by me.  All radiology studies have been reviewed by me and the radiologist dictating the report.  All EKG's have been independently viewed and interpreted by me.      Discussion below represents my analysis of pertinent findings related to patient's condition, differential diagnosis, treatment plan and final disposition.    Differential diagnosis:  The differential diagnosis associated with the patient's presentation includes: Cardiopulmonary arrest, STEMI, respiratory failure hypoxia, seizure    Additional sources  Discussed/ obtained information from independent historians:   [] Spouse  [] Parent  [] Family member  [] Friend  [x] EMS   [] Other:    External (non-ED) record review:   [] Inpatient record:   [] Office record:   [] Outpatient record:   [] Prior Outpatient labs:   [] Prior Outpatient radiology:   [] Primary Care record:   [] Outside ED record:   [] Other:     Patient's care impacted by:   [] Diabetes  [x] Hypertension  [] CHF  [] Hyperlipidemia  [] Coronary Artery Disease   [] COPD   [] Cancer   [] Tobacco Abuse   [] Substance Abuse    [] Other:     Care significantly affected by Social Determinants of Health (housing and economic circumstances, unemployment)    [] Yes     [x] No   If yes, Patient's care significantly limited by Social Determinants of Health including:   [] Inadequate housing   [] Low income   [] Alcoholism and drug addiction in family   [] Problems related to primary support group   [] Unemployment   [] Problems related to employment   [] Other Social Determinants of Health:       MEDICATIONS ADMINISTERED IN ED:  Medications   sodium chloride 0.9 % flush 10 mL (has no administration in time range)   EPINEPHrine 10 mg in 250 mL infusion (0.12 mcg/kg/min × 79.4 kg Intravenous Rate/Dose Change  4/6/25 1217)   EPINEPHrine (ADRENALIN) injection (1 mg Intravenous Given 4/6/25 1136)   sodium chloride 0.9 % bolus 1,000 mL (0 mL Intravenous Stopped 4/6/25 1212)   heparin (porcine) injection 5,000 Units (5,000 Units Intravenous Given 4/6/25 1158)            This is a 70-year-old female who presents in active cardiopulmonary arrest, resuscitative efforts were initiated in the field and route to the ED the patient is intubated, CPR is in progress.  She had received 2 rounds of epinephrine and route, remained in PEA arrest, on arrival the patient was given additional dose of epinephrine, respiratory support, she on pulse check is found to have return of spontaneous circulation, initial EKG immediately obtained did reveal ischemic changes in the anteroseptal leads, concern for ST elevation MI.  I did forward these results and discussed with Dr. Finnegan for interventional cardiology who agrees that patient may be having an ischemic event and recommends immediate catheterization.  Patient will be cooled, she did not have any purposeful movement, she is intubated, family was updated, she is hypotensive in the 80s, was started on epinephrine infusion, electrolytes on the Chem-8 appear stable.  Initial troponin is significantly evaded at 5252, creatinine 1.50.  Will initiate cooling procedures for neuroprotection.  Plan for catheterization in the intensive care hospitalization.  Case discussed with intensivist Dr. Marshall.      PROCEDURES:  Procedures    CRITICAL CARE TIME    Total Critical Care time was 60 minutes, excluding separately reportable procedures.  Patient with acute cardiopulmonary arrest, acute ST elevation MI with respiratory failure hypoxia, cardiogenic shock requiring multiple interventions, reassessments, discussions with multiple consultants, family members, EMS, intensivist. There was a high probability of clinically significant/life threatening deterioration in the patient's condition which  required my urgent intervention.      FINAL IMPRESSION      1. Cardiopulmonary arrest    2. ST elevation myocardial infarction (STEMI), unspecified artery    3. Acute respiratory failure with hypoxia    4. Cardiogenic shock          DISPOSITION/PLAN     ED Disposition       ED Disposition   Send to Cath Lab    Condition   --    Comment   --                 Comment: Please note this report has been produced using speech recognition software.      Caleb Najera DO  Attending Emergency Physician         Caleb Najera DO  04/06/25 2007

## 2025-04-06 NOTE — PROCEDURES
"Insert Central Line At Bedside    Date/Time: 4/6/2025 4:27 PM    Performed by: Adrien Marshall MD  Authorized by: Adrien Marshall MD  Consent: The procedure was performed in an emergent situation  Patient identity confirmed: arm band and provided demographic data  Time out: Immediately prior to procedure a \"time out\" was called to verify the correct patient, procedure, equipment, support staff and site/side marked as required.  Indications: vascular access  Anesthesia: see MAR for details    Sedation:  Patient sedated: no    Preparation: skin prepped with ChloraPrep  Skin prep agent dried: skin prep agent completely dried prior to procedure  Sterile barriers: all five maximum sterile barriers used - cap, mask, sterile gown, sterile gloves, and large sterile sheet  Hand hygiene: hand hygiene performed prior to central venous catheter insertion  Location details: right internal jugular  Patient position: Trendelenburg  Catheter type: triple lumen  Pre-procedure: landmarks identified  Ultrasound guidance: yes  Sterile ultrasound techniques: sterile gel and sterile probe covers were used  Number of attempts: 1  Successful placement: yes  Post-procedure: line sutured and dressing applied  Assessment: blood return through all ports, free fluid flow, placement verified by x-ray and no pneumothorax on x-ray  Patient tolerance: patient tolerated the procedure well with no immediate complications        "

## 2025-04-06 NOTE — Clinical Note
First balloon inflation max pressure = 16 karina. First balloon inflation duration = 20 seconds. Second inflation of balloon - Max pressure = 15 karina. 2nd Inflation of balloon - Duration = 20 seconds. The balloon is positioned in the Proximal segment of the vessel.

## 2025-04-06 NOTE — Clinical Note
First balloon inflation max pressure = 10 karina. First balloon inflation duration = 20 seconds. Second inflation of balloon - Max pressure = 16 karina. 2nd Inflation of balloon - Duration = 20 seconds. The balloon is positioned in the Proximal segment of the vessel. Third inflation of balloon - Max pressure = 8 karina. 3rd Inflation of balloon - Duration = 30 seconds. The balloon is positioned in the Proximal segment of the vessel.

## 2025-04-06 NOTE — PROGRESS NOTES
Intensive Care consultation     Patient is being seen for ICU management in the aftermath of ST elevation myocardial infarction, cardiac arrest, and acute respiratory failure    History of Present Illness     Falguni Laureano is a 70 y.o. female with past medical history significant for hypertension, hyperlipidemia, and indigestion. History is taken via chart review as patient is intubated on mechanical ventilation. Patient had been complaining of acid reflux since yesterday afternoon. Patient's grandson was bringing patient to ED earlier today. However, patient collapsed and became unresponsive. EMS was called and patient was found to be in PEA arrest. CPR was initiated and patient received 2 rounds epinephrine and was intubated en route to ED. On arrival, patient was given additional dose of epinephrine and ROSC was achieved. Time to ROSC roughly 45 minutes; see CODE sheet for details. EKG was obtained and showed ischemic changes in anteroseptal leads concerning for STEMI. Case was discussed with interventional cardiology and patient was taken emergently to cath lab. Per chart, patient had no purposeful movement following CODE BLUE. She was started on epinephrine infusion. Labs in ED were significant for troponin 5252, potassium 3.3, creatinine 1.50.      Time spent 10 minutes  Electronically signed by Preciosu Martínez PA-C, 04/06/25, 3:51 PM EDT.     The patient was seen upon arrival to the intensive care unit.  I was able to discuss the case with Dr. Finnegan after catheterization.  She underwent intervention to the LAD which was the culprit lesion.  She is brought to the ICU in an intubated state on a balloon pump at one-to-one augmentation and currently on norepinephrine and epinephrine.  Sedation is currently off.  The patient is on 100% FiO2 with a PEEP of 5.  She is nonresponsive to verbal command.  She is occasionally having some jerks in her extremities though not consistently.    Problem List, Surgical  History, Family, Social History, and ROS     STEMI involving left anterior descending coronary artery    Cardiac arrest    Acute respiratory failure with hypoxia    Metabolic acidosis    Cardiogenic shock    Acute kidney injury     Past Surgical History:   Procedure Laterality Date    HIP FRACTURE SURGERY  2020       No Known Allergies  No current facility-administered medications on file prior to encounter.     Current Outpatient Medications on File Prior to Encounter   Medication Sig    acetaminophen (TYLENOL) 500 MG tablet Take 1 tablet by mouth Every 6 (Six) Hours As Needed for Mild Pain. Indications: Pain    amLODIPine (NORVASC) 10 MG tablet TAKE 1 TABLET BY MOUTH DAILY    atorvastatin (LIPITOR) 10 MG tablet TAKE 1 TABLET BY MOUTH EVERY NIGHT    calcium carbonate (OS-EVELIO) 600 MG tablet Take 1 tablet by mouth Daily. Indications: Low Amount of Calcium in the Blood    lisinopril (PRINIVIL,ZESTRIL) 40 MG tablet Take 1 tablet by mouth Daily.    metoprolol tartrate (LOPRESSOR) 100 MG tablet TAKE 1 TABLET BY MOUTH TWICE DAILY    omeprazole (priLOSEC) 40 MG capsule TAKE 1 CAPSULE BY MOUTH DAILY FOR HEARTBURN     MEDICATION LIST AND ALLERGIES REVIEWED.    Family History   Problem Relation Age of Onset    No Known Problems Mother     No Known Problems Father     No Known Problems Daughter     No Known Problems Maternal Grandmother     No Known Problems Paternal Grandmother     No Known Problems Maternal Aunt     Breast cancer Paternal Aunt     No Known Problems Maternal Grandfather     No Known Problems Paternal Grandfather     No Known Problems Maternal Uncle     No Known Problems Paternal Uncle     Ovarian cancer Neg Hx      Social History     Tobacco Use    Smoking status: Never     Passive exposure: Never    Smokeless tobacco: Never   Vaping Use    Vaping status: Never Used   Substance Use Topics    Alcohol use: Not Currently    Drug use: Never     Social History     Social History Narrative    Not on file     FAMILY  "AND SOCIAL HISTORY REVIEWED.    Review of Systems  To obtain review of systems secondary to the patient's comatose state.    Physical Exam and Clinical Information   BP 96/63   Pulse 98   Temp 94.8 °F (34.9 °C) (Esophageal)   Resp 17   Ht 155 cm (61.02\")   Wt 79.4 kg (175 lb)   SpO2 93%   BMI 33.04 kg/m²   Physical Exam  Vitals reviewed.   Constitutional:       Comments: Nonresponsive, intubated   HENT:      Head: Normocephalic and atraumatic.      Mouth/Throat:      Mouth: Mucous membranes are moist.   Eyes:      Comments: Pupils are equal bilaterally and approximately 2 mm.  They do not appear to be reactive to light at this time.   Cardiovascular:      Rate and Rhythm: Normal rate and regular rhythm.      Pulses: Normal pulses.      Heart sounds: No murmur heard.  Pulmonary:      Effort: Pulmonary effort is normal. No respiratory distress.      Breath sounds: Normal breath sounds. No stridor. No wheezing, rhonchi or rales.   Abdominal:      General: Abdomen is flat. Bowel sounds are normal. There is no distension.      Palpations: Abdomen is soft.   Musculoskeletal:         General: No swelling.      Right lower leg: No edema.      Left lower leg: No edema.      Comments: Right femoral arterial sheath/arterial line with IABP  Right femoral venous sheath   Neurological:      Comments: Eye exam as before.  Patient does currently have a gag and cough.         Results from last 7 days   Lab Units 04/06/25  1148 04/06/25  1144   WBC 10*3/mm3  --  8.05   HEMOGLOBIN g/dL  --  14.6   HEMOGLOBIN, POC g/dL 15.6  --    PLATELETS 10*3/mm3  --  217     Results from last 7 days   Lab Units 04/06/25  1148 04/06/25  1144 03/31/25  1050   SODIUM mmol/L  --   --  139   POTASSIUM mmol/L  --   --  4.6   CO2 mmol/L  --   --  26.0   BUN mg/dL  --   --  11   CREATININE mg/dL 1.50*  --  1.02*   MAGNESIUM mg/dL  --  2.4  --    GLUCOSE mg/dL  --   --  92     Estimated Creatinine Clearance: 32.9 mL/min (A) (by C-G formula based on " "SCr of 1.5 mg/dL (H)).      Results from last 7 days   Lab Units 04/06/25  1520   PH, ARTERIAL pH units 7.278*   PCO2, ARTERIAL mm Hg 30.8*   PO2 ART mm Hg 79.7*     No results found for: \"LACTATE\"       I reviewed the patient's results and images.     Impression     STEMI involving left anterior descending coronary artery    Cardiac arrest    Acute respiratory failure with hypoxia    Metabolic acidosis    Cardiogenic shock    Acute kidney injury        Plan/Recommendations     Monitor closely in the intensive care unit.  Given her lack of response in the aftermath of her arrest, we will proceed with targeted temperature management with a goal of 36 °F.  Continue with augmentation with balloon pump as well as pressors as before.  We will escalate and de-escalate as able.  Maintain current ventilatory support.  Will wean the FiO2 as able.  Lungs currently appear to be relatively clear on the x-ray.  We will check intermittent blood gases and adjust as necessary.  The patient has received some bicarbonate.  We may need to redose her if her metabolic acidosis worsens.  Monitor renal function very closely.  I suspect we will see a worsening of her renal function given her dye load and hypotension.  If she were to worsen we will involve the nephrology service for renal replacement therapy.  I think there is a strong possibility that she has suffered severe anoxic injury given her prolonged resuscitation.  We will assess for recovery within the next 24 to 48 hours.  The patient is currently critically ill and at imminent risk of death.    High level of risk due to:  drug(s) requiring intensive monitoring for toxicity, parenteral controlled substances, and decision regarding hospitalization or escalation of level of hospital care.    Time spent Critical care 41 min (exclusive of procedure time)  including high complexity decision making to assess, manipulate, and support vital organ system failure in this individual who " has impairment of one or more vital organ systems such that there is a high probability of imminent or life threatening deterioration in the patient’s condition.      Adrien Marshall MD, [facility]  Pulmonary and Critical Care Medicine  04/06/25 16:16 EDT     CC: Fiona Fitch PA-C

## 2025-04-06 NOTE — SIGNIFICANT NOTE
04/06/25 1608   SLP Deferred Reason   SLP Deferred Reason Unable to evaluate, medical status change  (Consult received per TTM protocol. Will f/u for eval as appropriate.)

## 2025-04-06 NOTE — Clinical Note
ACT = 308 (sec). ACT was drawn at 12:50 EDT. ACT result was completed at 12:57 EDT. Dapsone Counseling: I discussed with the patient the risks of dapsone including but not limited to hemolytic anemia, agranulocytosis, rashes, methemoglobinemia, kidney failure, peripheral neuropathy, headaches, GI upset, and liver toxicity.  Patients who start dapsone require monitoring including baseline LFTs and weekly CBCs for the first month, then every month thereafter.  The patient verbalized understanding of the proper use and possible adverse effects of dapsone.  All of the patient's questions and concerns were addressed.

## 2025-04-07 NOTE — CASE MANAGEMENT/SOCIAL WORK
Continued Stay Note  [facility]     Patient Name: Falguni Laureano  MRN: [id number]  Today's Date: 4/7/2025    Admit Date: 4/6/2025    Plan: Ongoing   Discharge Plan       Row Name 04/07/25 1417       Plan    Plan Ongoing    Plan Comments Patient discussed in MDR, full support on ventilator. Family to arrive for goals of care.                   Discharge Codes    No documentation.                       Falguni Higgins RN

## 2025-04-07 NOTE — PAYOR COMM NOTE
"Jayde MILLER    phone: [phone]  fax: [phone]      ref# [id number]       Falguni Luque (70 y.o. Female)       Date of Birth   1955    Social Security Number       Address   [address] [place] [address] [place], [place] [address]    Home Phone   [phone]    MRN   [id number]       [facility]    Marital Status                               Admission Date   4/6/2025    Admission Type   Emergency    Admitting Provider   Ya Finnegan MD    Attending Provider   Ya Finnegan MD    [facility], Room/Bed   [facility]       Discharge Date       Discharge Disposition       Discharge Destination                                 Attending Provider: Ya Finnegan MD    Allergies: No Known Allergies    Isolation: None   Infection: None   Code Status: No CPR    Ht: 155 cm (61.02\")   Wt: 79.4 kg (175 lb)    Admission Cmt: None   Principal Problem: STEMI involving left anterior descending coronary artery [I21.02]                   Active Insurance as of 4/6/2025       Primary Coverage       Payor Plan Insurance Group Employer/Plan Group    ANTHEM MEDICARE REPLACEMENT ANTHEM MEDICARE ADVANTAGE HMO [id number]       Payor Plan Address Payor Plan Phone Number Payor Plan Fax Number Effective Dates    [address] [phone]  1/1/2024 - None Entered    [facility] [phone]         Subscriber Name Subscriber Birth Date Member ID       FALGUNI LUQUE 1955 [id number]                     Emergency Contacts        (Rel.) Home Phone Work Phone Mobile Phone    Gurpreet Raygoza (Grandchild) -- -- [phone]    Elma Johnson (Daughter) -- -- [phone]    Harvey Johnson (Son) -- -- [phone]                 History & Physical        Ya Finnegan MD at 04/06/25 1152          [facility] Cardiology  Consultation note    Subjective:     Patient ID: Falguni Luque is a 70 y.o. " female.  Referring provider: No ref. provider found     Reason for consultation:   Chief Complaint   Patient presents with    Cardiac Arrest        Problem List:  PEA arrest  Hypertensive Heart Disease  Echo, 09/18/2024: EF 60%. LVDF consistent with (grade 1) impaired relaxation. Trace MR. Mild TR with normal RVSP.   MPS, 09/18/2024: EF 68%. No evidence of ischemia. Negative for angina or diagnostic ST segment changes.   Dyslipidemia   Abnormal EKG  Echo, 09/18/2024: EF 60%. LVDF consistent with (grade 1) impaired relaxation. Trace MR. Mild TR with normal RVSP.   MPS, 09/18/2024: EF 68%. No evidence of ischemia. Negative for angina or diagnostic ST segment changes.   EKG consistent with hypertensive heart disease  GERD  Status post left hip replacement         No Known Allergies      Current Facility-Administered Medications:     sodium chloride 0.9 % flush 10 mL, 10 mL, Intravenous, PRN, Caleb Najera, DO    Current Outpatient Medications:     acetaminophen (TYLENOL) 500 MG tablet, Take 1 tablet by mouth Every 6 (Six) Hours As Needed for Mild Pain. Indications: Pain, Disp: , Rfl:     amLODIPine (NORVASC) 10 MG tablet, TAKE 1 TABLET BY MOUTH DAILY, Disp: 90 tablet, Rfl: 1    atorvastatin (LIPITOR) 10 MG tablet, TAKE 1 TABLET BY MOUTH EVERY NIGHT, Disp: 90 tablet, Rfl: 3    calcium carbonate (OS-EVELIO) 600 MG tablet, Take 1 tablet by mouth Daily. Indications: Low Amount of Calcium in the Blood, Disp: , Rfl:     lisinopril (PRINIVIL,ZESTRIL) 40 MG tablet, Take 1 tablet by mouth Daily., Disp: 90 tablet, Rfl: 1    metoprolol tartrate (LOPRESSOR) 100 MG tablet, TAKE 1 TABLET BY MOUTH TWICE DAILY, Disp: 180 tablet, Rfl: 0    omeprazole (priLOSEC) 40 MG capsule, TAKE 1 CAPSULE BY MOUTH DAILY FOR HEARTBURN, Disp: 90 capsule, Rfl: 0    HPI:      Falguni Laureano is a 70 y.o. female who presented to the emergency room today complaining of dyspnea.  She then had a PEA arrest and is now intubated.  Her EKG showed acute  anterolateral ST elevation.    Cardiac Risk Factors:  The following portions of the patient's history were reviewed and updated as appropriate: allergies, current medications and problem list.    Social History     Socioeconomic History    Marital status:    Tobacco Use    Smoking status: Never     Passive exposure: Never    Smokeless tobacco: Never   Vaping Use    Vaping status: Never Used   Substance and Sexual Activity    Alcohol use: Not Currently    Drug use: Never    Sexual activity: Defer     Family History   Problem Relation Age of Onset    No Known Problems Mother     No Known Problems Father     No Known Problems Daughter     No Known Problems Maternal Grandmother     No Known Problems Paternal Grandmother     No Known Problems Maternal Aunt     Breast cancer Paternal Aunt     No Known Problems Maternal Grandfather     No Known Problems Paternal Grandfather     No Known Problems Maternal Uncle     No Known Problems Paternal Uncle     Ovarian cancer Neg Hx        Review of Systems: Unobtainable as the patient is intubated       Objective:     Vitals:    04/06/25 1152   BP: 92/74   Pulse:        GENERAL: Intubated and sedated  HEENT: Sclera anicteric. Mucous membranes are pink and moist.   LUNGS: Bilateral wheezing noted.   CARDIOVASCULAR: The heart has a regular rate with a normal S1 and S2. There is no murmur, gallop, rub, or click appreciated.   ABDOMEN: Soft   MUSCULOSKELETAL: There are no obvious bony abnormalities. Normal range of tenderness to palpation.   NEUROLOGICAL: Intubated and sedated  PERIPHERAL VASCULAR: 1+ pedal pulses        Diagnostic Data (reviewed):    Lab Results   Component Value Date    GLUCOSE 92 03/31/2025    BUN 11 03/31/2025    CREATININE 1.02 (H) 03/31/2025    BCR 10.8 03/31/2025    K 4.6 03/31/2025    CO2 26.0 03/31/2025    CALCIUM 9.5 03/31/2025    ALBUMIN 4.2 03/31/2025    ALKPHOS 98 03/31/2025    AST 21 03/31/2025    ALT 14 03/31/2025      Lab Results   Component Value  Date    WBC 5.53 02/10/2025    RBC 5.13 02/10/2025    HGB 14.2 02/10/2025    HCT 42.4 02/10/2025    MCV 82.7 02/10/2025    MCH 27.7 02/10/2025     02/10/2025      Lab Results   Component Value Date    CHOL 143 02/10/2025    TRIG 173 (H) 02/10/2025    HDL 35 (L) 02/10/2025    LDL 78 02/10/2025      Lab Results   Component Value Date    HGBA1C 5.50 02/10/2025          Procedures        Assessment:     PEA arrest in the emergency room following complaints of dyspnea with associated acute anterolateral ST elevation.  Hypertension  Hyperlipidemia        Plan:     Emergent left heart catheterization with possible intervention    Ya Finnegan MD, FACC        Electronically signed by Ya Finnegan MD at 04/06/25 1350       Lab Results (last 24 hours)       Procedure Component Value Units Date/Time    aPTT [[id number]]  (Abnormal) Collected: 04/07/25 0808    Specimen: Blood Updated: 04/07/25 0908     .8 seconds     Narrative:      PTT = The equivalent PTT values for the therapeutic range of heparin levels at 0.3 to 0.5 U/ml are 60 to 70 seconds.    Comprehensive Metabolic Panel [[id number]]  (Abnormal) Collected: 04/07/25 0808    Specimen: Blood Updated: 04/07/25 0905     Glucose 223 mg/dL      BUN 25 mg/dL      Creatinine 2.65 mg/dL      Sodium 160 mmol/L      Potassium 4.4 mmol/L      Chloride 117 mmol/L      CO2 7.0 mmol/L      Calcium 7.0 mg/dL      Total Protein 4.1 g/dL      Albumin 3.0 g/dL      ALT (SGPT) 478 U/L      AST (SGOT) 929 U/L      Alkaline Phosphatase 50 U/L      Total Bilirubin 0.9 mg/dL      Globulin 1.1 gm/dL      Comment: Calculated Result        A/G Ratio 2.7 g/dL      BUN/Creatinine Ratio 9.4     Anion Gap 36.0 mmol/L      eGFR 18.7 mL/min/1.73     Narrative:      GFR Categories in Chronic Kidney Disease (CKD)      GFR Category          GFR (mL/min/1.73)    Interpretation  G1                     90 or greater         Normal or high (1)  G2                      60-89                 Mild decrease (1)  G3a                   45-59                Mild to moderate decrease  G3b                   30-44                Moderate to severe decrease  G4                    15-29                Severe decrease  G5                    14 or less           Kidney failure          (1)In the absence of evidence of kidney disease, neither GFR category G1 or G2 fulfill the criteria for CKD.    eGFR calculation 2021 CKD-EPI creatinine equation, which does not include race as a factor    STAT Lactic Acid, Reflex [[id number]]  (Abnormal) Collected: 04/07/25 0811    Specimen: Blood Updated: 04/07/25 0900     Lactate 24.1 mmol/L      Comment: Falsely depressed results may occur on samples drawn from patients receiving N-Acetylcysteine (NAC) or Metamizole.       Magnesium [[id number]]  (Abnormal) Collected: 04/07/25 0808    Specimen: Blood Updated: 04/07/25 0851     Magnesium 2.5 mg/dL     Phosphorus [[id number]]  (Normal) Collected: 04/07/25 0808    Specimen: Blood Updated: 04/07/25 0850     Phosphorus 4.4 mg/dL     Heparin Anti-Xa [[id number]]  (Abnormal) Collected: 04/07/25 0808    Specimen: Blood Updated: 04/07/25 0843     Heparin Anti-Xa (UFH) 0.17 IU/ml     CBC & Differential [[id number]]  (Abnormal) Collected: 04/07/25 0808    Specimen: Blood Updated: 04/07/25 0843    Narrative:      The following orders were created for panel order CBC & Differential.  Procedure                               Abnormality         Status                     ---------                               -----------         ------                     CBC Auto Differential[[id number]]        Abnormal            Final result               Scan Slide[[id number]]                                                                    Please view results for these tests on the individual orders.    CBC Auto Differential [[id number]]  (Abnormal) Collected: 04/07/25 0808    Specimen: Blood Updated: 04/07/25 0843     WBC 18.32 10*3/mm3      RBC 2.45  10*6/mm3      Hemoglobin 7.0 g/dL      Hematocrit 21.6 %      MCV 88.2 fL      MCH 28.6 pg      MCHC 32.4 g/dL      RDW 13.7 %      RDW-SD 44.1 fl      MPV 10.1 fL      Platelets 82 10*3/mm3      Comment: Verified by repeat analysis.          Neutrophil % 74.8 %      Lymphocyte % 15.6 %      Monocyte % 8.0 %      Eosinophil % 0.1 %      Basophil % 0.1 %      Immature Grans % 1.4 %      Neutrophils, Absolute 13.73 10*3/mm3      Lymphocytes, Absolute 2.85 10*3/mm3      Monocytes, Absolute 1.46 10*3/mm3      Eosinophils, Absolute 0.01 10*3/mm3      Basophils, Absolute 0.02 10*3/mm3      Immature Grans, Absolute 0.25 10*3/mm3      nRBC 0.1 /100 WBC     Calcium, Ionized [[id number]]  (Abnormal) Collected: 04/07/25 0811    Specimen: Blood Updated: 04/07/25 0828     Ionized Calcium 0.87 mmol/L     Lipid Panel [[id number]]  (Abnormal) Collected: 04/07/25 0503    Specimen: Blood Updated: 04/07/25 0719     Total Cholesterol 45 mg/dL      Triglycerides 135 mg/dL      HDL Cholesterol 15 mg/dL      LDL Cholesterol  6 mg/dL      VLDL Cholesterol 24 mg/dL      LDL/HDL Ratio 0.20    Narrative:      Cholesterol Reference Ranges  (U.S. Department of Health and Human Services ATP III Classifications)    Desirable          <200 mg/dL  Borderline High    200-239 mg/dL  High Risk          >240 mg/dL      Triglyceride Reference Ranges  (U.S. Department of Health and Human Services ATP III Classifications)    Normal           <150 mg/dL  Borderline High  150-199 mg/dL  High             200-499 mg/dL  Very High        >500 mg/dL    HDL Reference Ranges  (U.S. Department of Health and Human Services ATP III Classifications)    Low     <40 mg/dl (major risk factor for CHD)  High    >60 mg/dl ('negative' risk factor for CHD)        LDL Reference Ranges  (U.S. Department of Health and Human Services ATP III Classifications)    Optimal          <100 mg/dL  Near Optimal     100-129 mg/dL  Borderline High  130-159 mg/dL  High             160-189  mg/dL  Very High        >189 mg/dL    LDL is calculated using the NIH LDL-C calculation.      POC Glucose Once [[id number]]  (Abnormal) Collected: 04/07/25 0713    Specimen: Blood Updated: 04/07/25 0714     Glucose 276 mg/dL     POC Glucose Once [[id number]]  (Abnormal) Collected: 04/07/25 0559    Specimen: Blood Updated: 04/07/25 0600     Glucose 333 mg/dL     POC Glucose Once [[id number]]  (Abnormal) Collected: 04/07/25 0508    Specimen: Blood Updated: 04/07/25 0509     Glucose 369 mg/dL     Blood Gas, Arterial With Co-Ox [[id number]]  (Abnormal) Collected: 04/07/25 0429    Specimen: Arterial Blood Updated: 04/07/25 0429     Site Arterial Line     Thomas's Test N/A     pH, Arterial 7.199 pH units      Comment: 85 Value below critical limit        pCO2, Arterial 24.9 mm Hg      Comment: 84 Value below reference range        pO2, Arterial 74.4 mm Hg      Comment: 84 Value below reference range        HCO3, Arterial 9.7 mmol/L      Base Excess, Arterial -16.8 mmol/L      Hemoglobin, Blood Gas 8.9 g/dL      Comment: 84 Value below reference range        Hematocrit, Blood Gas 27.3 %      Oxyhemoglobin 91.0 %      Comment: 84 Value below reference range        Methemoglobin 0.70 %      Carboxyhemoglobin 0.5 %      CO2 Content 10.5 mmol/L      Temperature 37.0     Barometric Pressure for Blood Gas --     Comment: N/A        Modality Ventilator     FIO2 40 %      Ventilator Mode VC+/AC     Set Tidal Volume 0.42     Rate 24 Breaths/minute      PEEP 5.0     PIP 0 cmH2O      Comment: Meter: [id number]     :  [id number]        IPAP 0     EPAP 0     Notified Who BETH ALEX     Notified By [id number]     Notified Time 04/07/2025 04:29     pH, Temp Corrected 7.199 pH Units      pCO2, Temperature Corrected 24.9 mm Hg      pO2, Temperature Corrected 74.4 mm Hg     POC Glucose Once [[id number]]  (Abnormal) Collected: 04/07/25 0403    Specimen: Blood Updated: 04/07/25 0405     Glucose 402 mg/dL     aPTT [[id number]]  (Abnormal)  Collected: 04/07/25 0303    Specimen: Blood Updated: 04/07/25 0358     .4 seconds     Narrative:      PTT = The equivalent PTT values for the therapeutic range of heparin levels at 0.3 to 0.5 U/ml are 60 to 70 seconds.    Comprehensive Metabolic Panel [[id number]]  (Abnormal) Collected: 04/07/25 0303    Specimen: Blood Updated: 04/07/25 0346     Glucose 394 mg/dL      BUN 25 mg/dL      Creatinine 1.98 mg/dL      Sodium 156 mmol/L      Potassium 5.0 mmol/L      Comment: Slight hemolysis detected by analyzer. Result may be falsely elevated.        Chloride 113 mmol/L      CO2 10.0 mmol/L      Calcium 7.3 mg/dL      Total Protein 4.6 g/dL      Albumin 3.1 g/dL      ALT (SGPT) 495 U/L      AST (SGOT) 1,216 U/L      Alkaline Phosphatase 63 U/L      Total Bilirubin 0.6 mg/dL      Globulin 1.5 gm/dL      Comment: Calculated Result        A/G Ratio 2.1 g/dL      BUN/Creatinine Ratio 12.6     Anion Gap 33.0 mmol/L      eGFR 26.6 mL/min/1.73     Narrative:      GFR Categories in Chronic Kidney Disease (CKD)      GFR Category          GFR (mL/min/1.73)    Interpretation  G1                     90 or greater         Normal or high (1)  G2                      60-89                Mild decrease (1)  G3a                   45-59                Mild to moderate decrease  G3b                   30-44                Moderate to severe decrease  G4                    15-29                Severe decrease  G5                    14 or less           Kidney failure          (1)In the absence of evidence of kidney disease, neither GFR category G1 or G2 fulfill the criteria for CKD.    eGFR calculation 2021 CKD-EPI creatinine equation, which does not include race as a factor    STAT Lactic Acid, Reflex [[id number]]  (Abnormal) Collected: 04/07/25 0303    Specimen: Blood Updated: 04/07/25 0346     Lactate 23.1 mmol/L      Comment: Falsely depressed results may occur on samples drawn from patients receiving N-Acetylcysteine (NAC) or  Metamizole.       CBC & Differential [[id number]]  (Abnormal) Collected: 04/07/25 0303    Specimen: Blood Updated: 04/07/25 0341    Narrative:      The following orders were created for panel order CBC & Differential.  Procedure                               Abnormality         Status                     ---------                               -----------         ------                     CBC Auto Differential[[id number]]        Abnormal            Final result                 Please view results for these tests on the individual orders.    CBC Auto Differential [[id number]]  (Abnormal) Collected: 04/07/25 0303    Specimen: Blood Updated: 04/07/25 0341     WBC 18.71 10*3/mm3      RBC 3.17 10*6/mm3      Hemoglobin 8.9 g/dL      Hematocrit 27.5 %      MCV 86.8 fL      MCH 28.1 pg      MCHC 32.4 g/dL      RDW 13.3 %      RDW-SD 42.3 fl      MPV 9.4 fL      Platelets 135 10*3/mm3      Neutrophil % 83.2 %      Lymphocyte % 10.2 %      Monocyte % 5.1 %      Eosinophil % 0.1 %      Basophil % 0.1 %      Immature Grans % 1.3 %      Neutrophils, Absolute 15.57 10*3/mm3      Lymphocytes, Absolute 1.90 10*3/mm3      Monocytes, Absolute 0.96 10*3/mm3      Eosinophils, Absolute 0.02 10*3/mm3      Basophils, Absolute 0.02 10*3/mm3      Immature Grans, Absolute 0.24 10*3/mm3      nRBC 0.1 /100 WBC     Magnesium [[id number]]  (Abnormal) Collected: 04/07/25 0303    Specimen: Blood Updated: 04/07/25 0341     Magnesium 2.7 mg/dL     Phosphorus [[id number]]  (Normal) Collected: 04/07/25 0303    Specimen: Blood Updated: 04/07/25 0340     Phosphorus 3.9 mg/dL     Calcium, Ionized [[id number]]  (Abnormal) Collected: 04/07/25 0303    Specimen: Blood Updated: 04/07/25 0315     Ionized Calcium 0.92 mmol/L     POC Glucose Once [[id number]]  (Abnormal) Collected: 04/07/25 0306    Specimen: Blood Updated: 04/07/25 0309     Glucose 414 mg/dL     POC Glucose Once [[id number]]  (Abnormal) Collected: 04/07/25 0150    Specimen: Blood Updated: 04/07/25 0151      Glucose 394 mg/dL     Potassium [[id number]]  (Abnormal) Collected: 04/07/25 0023    Specimen: Blood Updated: 04/07/25 0056     Potassium 3.4 mmol/L      Comment: Slight hemolysis detected by analyzer. Result may be falsely elevated.       STAT Lactic Acid, Reflex [[id number]]  (Abnormal) Collected: 04/06/25 2336    Specimen: Blood Updated: 04/07/25 0038     Lactate 22.3 mmol/L      Comment: Falsely depressed results may occur on samples drawn from patients receiving N-Acetylcysteine (NAC) or Metamizole.       Heparin Anti-Xa [[id number]]  (Normal) Collected: 04/06/25 2336    Specimen: Blood Updated: 04/07/25 0036     Heparin Anti-Xa (UFH) 0.69 IU/ml     Blood Gas, Arterial With Co-Ox [[id number]]  (Abnormal) Collected: 04/07/25 0030    Specimen: Arterial Blood Updated: 04/07/25 0030     Site Arterial Line     Thomas's Test N/A     pH, Arterial 7.197 pH units      Comment: 85 Value below critical limit        pCO2, Arterial 27.5 mm Hg      Comment: 84 Value below reference range        pO2, Arterial 72.4 mm Hg      Comment: 84 Value below reference range        HCO3, Arterial 10.7 mmol/L      Base Excess, Arterial -16.0 mmol/L      Hemoglobin, Blood Gas 9.5 g/dL      Comment: 84 Value below reference range        Hematocrit, Blood Gas 29.1 %      Oxyhemoglobin 90.5 %      Comment: 84 Value below reference range        Methemoglobin 0.80 %      Carboxyhemoglobin 0.6 %      CO2 Content 11.5 mmol/L      Temperature 37.0     Barometric Pressure for Blood Gas --     Comment: N/A        Modality Ventilator     FIO2 40 %      Ventilator Mode VC+/AC     Set Tidal Volume 0.42     Rate 22 Breaths/minute      PEEP 5.0     PIP 0 cmH2O      Comment: Meter: [id number]     :  [id number]        IPAP 0     EPAP 0     Notified Vikram Crowley RN     Notified By [id number]     Notified Time 04/07/2025 00:30     pH, Temp Corrected 7.197 pH Units      pCO2, Temperature Corrected 27.5 mm Hg      pO2, Temperature Corrected 72.4 mm  Hg     POC Glucose Once [[id number]]  (Abnormal) Collected: 04/06/25 2338    Specimen: Blood Updated: 04/06/25 2340     Glucose 335 mg/dL     POC Glucose Once [[id number]]  (Abnormal) Collected: 04/06/25 2202    Specimen: Blood Updated: 04/06/25 2202     Glucose 283 mg/dL     Comprehensive Metabolic Panel [[id number]]  (Abnormal) Collected: 04/06/25 2101    Specimen: Blood Updated: 04/06/25 2143     Glucose 283 mg/dL      BUN 24 mg/dL      Creatinine 1.82 mg/dL      Sodium 152 mmol/L      Potassium 2.4 mmol/L      Comment: Slight hemolysis detected by analyzer. Result may be falsely elevated.        Chloride 106 mmol/L      CO2 13.0 mmol/L      Calcium 7.7 mg/dL      Total Protein 5.6 g/dL      Albumin 3.1 g/dL      ALT (SGPT) 715 U/L      AST (SGOT) 2,096 U/L      Alkaline Phosphatase 98 U/L      Total Bilirubin 0.5 mg/dL      Globulin 2.5 gm/dL      Comment: Calculated Result        A/G Ratio 1.2 g/dL      BUN/Creatinine Ratio 13.2     Anion Gap 33.0 mmol/L      eGFR 29.4 mL/min/1.73     Narrative:      GFR Categories in Chronic Kidney Disease (CKD)      GFR Category          GFR (mL/min/1.73)    Interpretation  G1                     90 or greater         Normal or high (1)  G2                      60-89                Mild decrease (1)  G3a                   45-59                Mild to moderate decrease  G3b                   30-44                Moderate to severe decrease  G4                    15-29                Severe decrease  G5                    14 or less           Kidney failure          (1)In the absence of evidence of kidney disease, neither GFR category G1 or G2 fulfill the criteria for CKD.    eGFR calculation 2021 CKD-EPI creatinine equation, which does not include race as a factor    Lactic Acid, Plasma [[id number]]  (Abnormal) Collected: 04/06/25 2101    Specimen: Blood Updated: 04/06/25 2143     Lactate 21.7 mmol/L      Comment: Falsely depressed results may occur on samples drawn from patients  receiving N-Acetylcysteine (NAC) or Metamizole.       Phosphorus [[id number]]  (Abnormal) Collected: 04/06/25 2101    Specimen: Blood Updated: 04/06/25 2143     Phosphorus 1.0 mg/dL     aPTT [[id number]]  (Abnormal) Collected: 04/06/25 2101    Specimen: Blood Updated: 04/06/25 2143     .3 seconds     Narrative:      PTT = The equivalent PTT values for the therapeutic range of heparin levels at 0.3 to 0.5 U/ml are 60 to 70 seconds.    Magnesium [[id number]]  (Normal) Collected: 04/06/25 2101    Specimen: Blood Updated: 04/06/25 2132     Magnesium 1.9 mg/dL     CBC & Differential [[id number]]  (Abnormal) Collected: 04/06/25 2101    Specimen: Blood Updated: 04/06/25 2113    Narrative:      The following orders were created for panel order CBC & Differential.  Procedure                               Abnormality         Status                     ---------                               -----------         ------                     CBC Auto Differential[[id number]]        Abnormal            Final result                 Please view results for these tests on the individual orders.    CBC Auto Differential [[id number]]  (Abnormal) Collected: 04/06/25 2101    Specimen: Blood Updated: 04/06/25 2113     WBC 19.47 10*3/mm3      RBC 3.95 10*6/mm3      Hemoglobin 11.1 g/dL      Hematocrit 33.3 %      MCV 84.3 fL      MCH 28.1 pg      MCHC 33.3 g/dL      RDW 13.0 %      RDW-SD 39.7 fl      MPV 9.5 fL      Platelets 177 10*3/mm3      Neutrophil % 82.2 %      Lymphocyte % 9.1 %      Monocyte % 7.5 %      Eosinophil % 0.2 %      Basophil % 0.1 %      Immature Grans % 0.9 %      Neutrophils, Absolute 16.00 10*3/mm3      Lymphocytes, Absolute 1.78 10*3/mm3      Monocytes, Absolute 1.46 10*3/mm3      Eosinophils, Absolute 0.03 10*3/mm3      Basophils, Absolute 0.02 10*3/mm3      Immature Grans, Absolute 0.18 10*3/mm3      nRBC 0.2 /100 WBC     Calcium, Ionized [[id number]]  (Abnormal) Collected: 04/06/25 2101    Specimen: Blood  Updated: 04/06/25 2112     Ionized Calcium 0.94 mmol/L     POC Glucose Once [[id number]]  (Abnormal) Collected: 04/06/25 2104    Specimen: Blood Updated: 04/06/25 2111     Glucose 283 mg/dL     Basic Metabolic Panel [[id number]]  (Abnormal) Collected: 04/06/25 2019    Specimen: Blood Updated: 04/06/25 2053     Glucose 287 mg/dL      BUN 24 mg/dL      Creatinine 1.79 mg/dL      Sodium 152 mmol/L      Potassium 2.7 mmol/L      Comment: Slight hemolysis detected by analyzer. Result may be falsely elevated.        Chloride 105 mmol/L      CO2 15.0 mmol/L      Calcium 7.6 mg/dL      BUN/Creatinine Ratio 13.4     Anion Gap 32.0 mmol/L      eGFR 30.0 mL/min/1.73     Narrative:      GFR Categories in Chronic Kidney Disease (CKD)      GFR Category          GFR (mL/min/1.73)    Interpretation  G1                     90 or greater         Normal or high (1)  G2                      60-89                Mild decrease (1)  G3a                   45-59                Mild to moderate decrease  G3b                   30-44                Moderate to severe decrease  G4                    15-29                Severe decrease  G5                    14 or less           Kidney failure          (1)In the absence of evidence of kidney disease, neither GFR category G1 or G2 fulfill the criteria for CKD.    eGFR calculation 2021 CKD-EPI creatinine equation, which does not include race as a factor    STAT Lactic Acid, Reflex [[id number]]  (Abnormal) Collected: 04/06/25 1947    Specimen: Blood Updated: 04/06/25 2018     Lactate 20.0 mmol/L      Comment: Falsely depressed results may occur on samples drawn from patients receiving N-Acetylcysteine (NAC) or Metamizole.       POC Glucose Once [[id number]]  (Abnormal) Collected: 04/06/25 1946    Specimen: Blood Updated: 04/06/25 1948     Glucose 297 mg/dL     Blood Gas, Arterial With Co-Ox [[id number]]  (Abnormal) Collected: 04/06/25 1913    Specimen: Arterial Blood Updated: 04/06/25 1913     Site  Arterial Line     Thomas's Test N/A     pH, Arterial 7.192 pH units      Comment: 85 Value below critical limit        pCO2, Arterial 29.3 mm Hg      Comment: 84 Value below reference range        pO2, Arterial 143.0 mm Hg      HCO3, Arterial 11.2 mmol/L      Base Excess, Arterial -15.6 mmol/L      Hemoglobin, Blood Gas 11.1 g/dL      Comment: 84 Value below reference range        Hematocrit, Blood Gas 34.1 %      Oxyhemoglobin 97.8 %      Methemoglobin 0.50 %      Carboxyhemoglobin 0.4 %      CO2 Content 12.1 mmol/L      Temperature 37.0     Barometric Pressure for Blood Gas --     Comment: N/A        Modality Ventilator     FIO2 90 %      Ventilator Mode VC+/AC     Set Tidal Volume 0.34     Rate 14 Breaths/minute      PEEP 5.0     PIP 0 cmH2O      Comment: Meter: [id number]     :  [id number]        IPAP 0     EPAP 0     Notified [facility] summer marroquin rn     Notified By [id number]     Notified Time 04/06/2025 19:12     pH, Temp Corrected 7.192 pH Units      pCO2, Temperature Corrected 29.3 mm Hg      pO2, Temperature Corrected 143 mm Hg     Heparin Anti-Xa [[id number]]  (Normal) Collected: 04/06/25 1623    Specimen: Blood Updated: 04/06/25 1742     Heparin Anti-Xa (UFH) 0.59 IU/ml     Comprehensive Metabolic Panel [[id number]]  (Abnormal) Collected: 04/06/25 1623    Specimen: Blood Updated: 04/06/25 1718     Glucose 395 mg/dL      BUN 21 mg/dL      Creatinine 1.68 mg/dL      Sodium 144 mmol/L      Potassium 2.7 mmol/L      Comment: Slight hemolysis detected by analyzer. Result may be falsely elevated.        Chloride 100 mmol/L      CO2 17.0 mmol/L      Calcium 7.6 mg/dL      Total Protein 5.7 g/dL      Albumin 3.2 g/dL      ALT (SGPT) 828 U/L      AST (SGOT) 1,971 U/L      Alkaline Phosphatase 96 U/L      Total Bilirubin 0.5 mg/dL      Globulin 2.5 gm/dL      Comment: Calculated Result        A/G Ratio 1.3 g/dL      BUN/Creatinine Ratio 12.5     Anion Gap 27.0 mmol/L      eGFR 32.4 mL/min/1.73     Narrative:      GFR  Categories in Chronic Kidney Disease (CKD)      GFR Category          GFR (mL/min/1.73)    Interpretation  G1                     90 or greater         Normal or high (1)  G2                      60-89                Mild decrease (1)  G3a                   45-59                Mild to moderate decrease  G3b                   30-44                Moderate to severe decrease  G4                    15-29                Severe decrease  G5                    14 or less           Kidney failure          (1)In the absence of evidence of kidney disease, neither GFR category G1 or G2 fulfill the criteria for CKD.    eGFR calculation 2021 CKD-EPI creatinine equation, which does not include race as a factor    aPTT [[id number]]  (Abnormal) Collected: 04/06/25 1623    Specimen: Blood Updated: 04/06/25 1715     .9 seconds     Narrative:      PTT = The equivalent PTT values for the therapeutic range of heparin levels at 0.3 to 0.5 U/ml are 60 to 70 seconds.    Protime-INR [[id number]]  (Abnormal) Collected: 04/06/25 1623    Specimen: Blood Updated: 04/06/25 1714     Protime 18.9 Seconds      INR 1.48    CBC & Differential [[id number]]  (Abnormal) Collected: 04/06/25 1623    Specimen: Blood Updated: 04/06/25 1714    Narrative:      The following orders were created for panel order CBC & Differential.  Procedure                               Abnormality         Status                     ---------                               -----------         ------                     CBC Auto Differential[[id number]]        Abnormal            Final result                 Please view results for these tests on the individual orders.    CBC Auto Differential [[id number]]  (Abnormal) Collected: 04/06/25 1623    Specimen: Blood Updated: 04/06/25 1714     WBC 15.71 10*3/mm3      RBC 4.15 10*6/mm3      Hemoglobin 11.6 g/dL      Hematocrit 33.9 %      MCV 81.7 fL      MCH 28.0 pg      MCHC 34.2 g/dL      RDW 12.9 %      RDW-SD 38.5 fl      MPV  8.9 fL      Platelets 207 10*3/mm3      Neutrophil % 79.4 %      Lymphocyte % 11.8 %      Monocyte % 7.4 %      Eosinophil % 0.1 %      Basophil % 0.1 %      Immature Grans % 1.2 %      Neutrophils, Absolute 12.48 10*3/mm3      Lymphocytes, Absolute 1.85 10*3/mm3      Monocytes, Absolute 1.16 10*3/mm3      Eosinophils, Absolute 0.01 10*3/mm3      Basophils, Absolute 0.02 10*3/mm3      Immature Grans, Absolute 0.19 10*3/mm3      nRBC 0.0 /100 WBC     Lactic Acid, Plasma [[id number]]  (Abnormal) Collected: 04/06/25 1623    Specimen: Blood Updated: 04/06/25 1711     Lactate 15.4 mmol/L      Comment: Falsely depressed results may occur on samples drawn from patients receiving N-Acetylcysteine (NAC) or Metamizole.       High Sensitivity Troponin T 1Hr [[id number]]  (Abnormal) Collected: 04/06/25 1623    Specimen: Blood Updated: 04/06/25 1710     HS Troponin T >10,000 ng/L      Troponin T Numeric Delta --     Comment: Unable to calculate.        Troponin T % Delta >90    Narrative:      High Sensitive Troponin T Reference Range:  <14.0 ng/L- Negative Female for AMI  <22.0 ng/L- Negative Male for AMI  >=14 - Abnormal Female indicating possible myocardial injury.  >=22 - Abnormal Male indicating possible myocardial injury.   Clinicians would have to utilize clinical acumen, EKG, Troponin, and serial changes to determine if it is an Acute Myocardial Infarction or myocardial injury due to an underlying chronic condition.         POC Glucose Once [[id number]]  (Abnormal) Collected: 04/06/25 1706    Specimen: Blood Updated: 04/06/25 1709     Glucose 357 mg/dL     Magnesium [[id number]]  (Normal) Collected: 04/06/25 1623    Specimen: Blood Updated: 04/06/25 1708     Magnesium 2.0 mg/dL     Phosphorus [[id number]]  (Normal) Collected: 04/06/25 1623    Specimen: Blood Updated: 04/06/25 1705     Phosphorus 3.3 mg/dL     [place] Draw [[id number]] Collected: 04/06/25 1623    Specimen: Blood Updated: 04/06/25 1645    Narrative:      The  following orders were created for panel order [place] Draw.  Procedure                               Abnormality         Status                     ---------                               -----------         ------                     Green Top (Gel)[[id number]]                                  Final result               Lavender Top[[id number]]                                     Final result               Gold Top - SST[[id number]]                                   Final result               Cruz Top[[id number]]                                         Final result               Light Blue Top[[id number]]                                   Final result                 Please view results for these tests on the individual orders.    Lavender Top [[id number]] Collected: 04/06/25 1623    Specimen: Blood Updated: 04/06/25 1645     Extra Tube hold for add-on     Comment: Auto resulted       Green Top (Gel) [[id number]] Collected: 04/06/25 1623    Specimen: Blood Updated: 04/06/25 1645     Extra Tube Hold for add-ons.     Comment: Auto resulted.       Gold Top - SST [[id number]] Collected: 04/06/25 1623    Specimen: Blood Updated: 04/06/25 1645     Extra Tube Hold for add-ons.     Comment: Auto resulted.       Gray Top [[id number]] Collected: 04/06/25 1623    Specimen: Blood Updated: 04/06/25 1645     Extra Tube Hold for add-ons.     Comment: Auto resulted.       Light Blue Top [[id number]] Collected: 04/06/25 1623    Specimen: Blood Updated: 04/06/25 1645     Extra Tube Hold for add-ons.     Comment: Auto resulted       Calcium, Ionized [[id number]]  (Abnormal) Collected: 04/06/25 1623    Specimen: Blood Updated: 04/06/25 1638     Ionized Calcium 0.91 mmol/L     CK Total & CKMB [[id number]]  (Abnormal) Collected: 04/06/25 1144    Specimen: Blood Updated: 04/06/25 1533     CKMB 181.50 ng/mL      Creatine Kinase 4,179 U/L     Narrative:      CKMB results may be falsely decreased if patient taking Biotin.    Procalcitonin [[id number]]   "(Normal) Collected: 04/06/25 1144    Specimen: Blood Updated: 04/06/25 1527     Procalcitonin 0.04 ng/mL     Narrative:      As a Marker for Sepsis (Non-Neonates):    1. <0.5 ng/mL represents a low risk of severe sepsis and/or septic shock.  2. >2 ng/mL represents a high risk of severe sepsis and/or septic shock.    As a Marker for Lower Respiratory Tract Infections that require antibiotic therapy:    PCT on Admission    Antibiotic Therapy       6-12 Hrs later    >0.5                Strongly Recommended  >0.25 - <0.5        Recommended   0.1 - 0.25          Discouraged              Remeasure/reassess PCT  <0.1                Strongly Discouraged     Remeasure/reassess PCT    As 28 day mortality risk marker: \"Change in Procalcitonin Result\" (>80% or <=80%) if Day 0 (or Day 1) and Day 4 values are available. Refer to http://[email or web address]pct[facility]calculator.com    Change in PCT <=80%  A decrease of PCT levels below or equal to 80% defines a positive change in PCT test result representing a higher risk for 28-day all-cause mortality of patients diagnosed with severe sepsis for septic shock.    Change in PCT >80%  A decrease of PCT levels of more than 80% defines a negative change in PCT result representing a lower risk for 28-day all-cause mortality of patients diagnosed with severe sepsis or septic shock.       Blood Gas, Arterial With Co-Ox [[id number]]  (Abnormal) Collected: 04/06/25 1520    Specimen: Arterial Blood Updated: 04/06/25 1521     Site Arterial Line     Thomas's Test N/A     pH, Arterial 7.278 pH units      Comment: 84 Value below reference range        pCO2, Arterial 30.8 mm Hg      Comment: 84 Value below reference range        pO2, Arterial 79.7 mm Hg      Comment: 84 Value below reference range        HCO3, Arterial 14.4 mmol/L      Base Excess, Arterial -11.1 mmol/L      Hemoglobin, Blood Gas 13.4 g/dL      Comment: 84 Value below reference range        Hematocrit, Blood Gas 41.2 %      Oxyhemoglobin 93.8 %  "     Comment: 84 Value below reference range        Methemoglobin 0.30 %      Carboxyhemoglobin 0.5 %      CO2 Content 15.4 mmol/L      Temperature 37.0     Barometric Pressure for Blood Gas --     Comment: N/A        Modality Ventilator     FIO2 100 %      Ventilator Mode VC+/AC     Set Tidal Volume 0.34     Rate 14 Breaths/minute      PEEP 5.0     PIP 0 cmH2O      Comment: Meter: [id number]     :  [id number]        IPAP 0     EPAP 0     pH, Temp Corrected 7.278 pH Units      pCO2, Temperature Corrected 30.8 mm Hg      pO2, Temperature Corrected 79.7 mm Hg     POC Glucose Once [[id number]]  (Abnormal) Collected: 04/06/25 1507    Specimen: Blood Updated: 04/06/25 1509     Glucose 241 mg/dL     POC Activated Clotting Time [[id number]]  (Abnormal) Collected: 04/06/25 1256    Specimen: Blood Updated: 04/06/25 1438     Activated Clotting Time  308 Seconds      Comment: Serial Number: [id number]:  [id number]             Imaging Results (Last 24 Hours)       Procedure Component Value Units Date/Time    CT Head Without Contrast [[id number]] Collected: 04/06/25 1750     Updated: 04/06/25 1757    Narrative:      CT HEAD WO CONTRAST    Date of Exam: 4/6/2025 5:37 PM EDT    Indication: Neuro deficit(s), subacute  Target Temperature Management Initiation.    Comparison: 6/25/2024    Technique: Axial CT images were obtained of the head without contrast administration.  Automated exposure control and iterative construction methods were used.    Findings: No intracranial hemorrhage. Gray-white matter differentiation is maintained without evidence of an acute infarction. Multiple foci of decreased attenuation are present within the subcortical, deep cerebral, and periventricular white matter   consistent with chronic small vessel/microangiopathic ischemic changes. No extra-axial mass or collection. The ventricles and sulci are prominent commensurate with involutional changes. The posterior fossa appears grossly normal.  Sellar and suprasellar   structures are normal. Residual contrast noted from the patient's recent cardiac catheterization performed on the same date..    Orbital and periorbital soft tissues are normal. The paranasal sinuses, ethmoid air cells, and mastoid air cells are aerated. The bony calvarium is intact.      Impression:      Impression: No acute intracranial pathology.          Electronically Signed: Donovan Michel MD    4/6/2025 5:54 PM EDT    Workstation ID: [id number]    XR Chest 1 View [[id number]] Collected: 04/06/25 1632     Updated: 04/06/25 1638    Narrative:      XR CHEST 1 VW    Date of Exam: 4/6/2025 4:08 PM EDT    Indication: line placement    Comparison: 4/6/2025    Findings:  Right internal jugular central venous catheter with the tip in the midsuperior vena cava. Endotracheal tube tip 1.7 cm above the level of the ning. Feeding tube tip extending below the diaphragm and beyond the field-of-view. No consolidation. Small   left effusion. The clavicles are intact. Healed left rib fractures.       Impression:      Impression:     Appropriate placement of the right internal jugular central venous catheter         Electronically Signed: Donovan Michel MD    4/6/2025 4:35 PM EDT    Workstation ID: [id number]    XR Chest 1 View [[id number]] Collected: 04/06/25 1553     Updated: 04/06/25 1557    Narrative:      XR CHEST 1 VW    Date of Exam: 4/6/2025 3:07 PM EDT    Indication: Baseline Status Therapeutic Hypothermia  Baseline Status Therapeutic Hypothermia    Comparison: Chest x-ray 6/25/2024    Findings:  The heart is stable in size. There is placement of endotracheal tube with the distal tip below the thoracic inlet and above the level of the ning. The distal tip of an enteric tube is collimated. Left lower lobe airspace disease is noted with   obscuration of the left hemidiaphragm. Trace effusion is noted. There is linear scarring in the right lower lung. No evidence for pneumothorax. There are multilevel  healed posterior left rib fractures. Contrast material is noted in the renal collecting   systems bilaterally.      Impression:      Impression:    1. Medial left lower lobe airspace disease and suspected trace effusion.      Electronically Signed: Jaylene Gagnon MD    4/6/2025 3:54 PM EDT    Workstation ID: [id number]          ECG/EMG Results (last 24 hours)       Procedure Component Value Units Date/Time    ECG 12 Lead Chest Pain [[id number]] Collected: 04/06/25 1540     Updated: 04/06/25 1541     QT Interval 454 ms      QTC Interval 561 ms     Narrative:      Test Reason : Chest Pain  Blood Pressure :   */*   mmHG  Vent. Rate :  92 BPM     Atrial Rate :  92 BPM     P-R Int : 144 ms          QRS Dur : 118 ms      QT Int : 454 ms       P-R-T Axes :  52 -60  39 degrees    QTcB Int : 561 ms    ** Critical Test Result: STEMI  Normal sinus rhythm  Left axis deviation  Right bundle branch block  Inferior infarct Anterolateral infarct ** ** ACUTE MI / STEMI ** **  Abnormal ECG  When compared with ECG of 06-Apr-2025 11:45, (Unconfirmed)  Significant changes have occurred    Referred By:            Confirmed By:     ECG 12 Lead Other; Post Cardiac Arrest - Therapeutic Hypothermia [[id number]] Collected: 04/07/25 0239     Updated: 04/07/25 0658     QT Interval 336 ms      QTC Interval 486 ms     Narrative:      Test Reason : Other~  Blood Pressure :   */*   mmHG  Vent. Rate : 126 BPM     Atrial Rate : 126 BPM     P-R Int : 128 ms          QRS Dur :  96 ms      QT Int : 336 ms       P-R-T Axes :  61 -59  61 degrees    QTcB Int : 486 ms    Sinus tachycardia  Left axis deviation  Low voltage QRS  Incomplete right bundle branch block  Cannot rule out Anteroseptal infarct (cited on or before 06-Apr-2025)  Abnormal ECG  When compared with ECG of 06-Apr-2025 15:40, (Unconfirmed)  QRS duration has decreased  Criteria for Inferior infarct are no longer present  Serial changes of evolving Anteroseptal infarct present    Referred By:  "Noah           Confirmed By:              Operative/Procedure Notes (all)        Adrien Marshall MD at 04/06/25 1626  Version 1 of 1       Procedure Orders    1. Insert Central Line At Bedside [[id number]] ordered by Adrien Marshall MD               Post-procedure Diagnoses    1. Cardiac arrest [I46.9]    2. Cardiogenic shock [R57.0]    3. Metabolic acidosis [E87.20]    4. Acute respiratory failure with hypoxia [J96.01]                 Insert Central Line At Bedside    Date/Time: 4/6/2025 4:27 PM    Performed by: Adrien Marshall MD  Authorized by: Adrien Marshall MD  Consent: The procedure was performed in an emergent situation  Patient identity confirmed: arm band and provided demographic data  Time out: Immediately prior to procedure a \"time out\" was called to verify the correct patient, procedure, equipment, support staff and site/side marked as required.  Indications: vascular access  Anesthesia: see MAR for details    Sedation:  Patient sedated: no    Preparation: skin prepped with ChloraPrep  Skin prep agent dried: skin prep agent completely dried prior to procedure  Sterile barriers: all five maximum sterile barriers used - cap, mask, sterile gown, sterile gloves, and large sterile sheet  Hand hygiene: hand hygiene performed prior to central venous catheter insertion  Location details: right internal jugular  Patient position: Trendelenburg  Catheter type: triple lumen  Pre-procedure: landmarks identified  Ultrasound guidance: yes  Sterile ultrasound techniques: sterile gel and sterile probe covers were used  Number of attempts: 1  Successful placement: yes  Post-procedure: line sutured and dressing applied  Assessment: blood return through all ports, free fluid flow, placement verified by x-ray and no pneumothorax on x-ray  Patient tolerance: patient tolerated the procedure well with no immediate complications          Electronically signed by Adrien Marshall MD at 04/06/25 " "1627          Physician Progress Notes (last 24 hours)        Harinder Jo MD at 04/07/25 1020            INTENSIVIST NOTE     Hospital Day: 1    Ms. Falguni Laureano, 70 y.o. female is followed for:   Out-of-hospital cardiac arrest, shock, and anoxic encephalopathy       SUBJECTIVE     Interval history:    Mechanical ventilation with rate 24, tidal volume 420, FiO2 40%, and PEEP 5 multiple drips including bicarbonate, vasopressin, epinephrine, norepinephrine, Manoj-Synephrine, heparin, fentanyl and insulin.  IABP remains in place 1:1.  Grandson at bedside    The patient's relevant past medical, surgical and social history were reviewed and updated in Epic as appropriate.       OBJECTIVE     Vital Sign Min/Max for last 24 hours  Temp  Min: 89.6 °F (32 °C)  Max: 97.5 °F (36.4 °C)   BP  Min: 70/31  Max: 125/64   Pulse  Min: 84  Max: 144   Resp  Min: 14  Max: 24   SpO2  Min: 85 %  Max: 100 %   No data recorded   Weight  Min: 79.4 kg (175 lb)  Max: 79.4 kg (175 lb)      Intake/Output Summary (Last 24 hours) at 4/7/2025 1020  Last data filed at 4/7/2025 1000  Gross per 24 hour   Intake 4774.94 ml   Output 750 ml   Net 4024.94 ml      Flowsheet Rows      Flowsheet Row First Filed Value   Admission Height 155 cm (61.02\") Documented at 04/06/2025 1154   Admission Weight 79.4 kg (175 lb) Documented at 04/06/2025 1154               04/06/25  1154   Weight: 79.4 kg (175 lb)            Objective:  Vital signs: (most recent): Blood pressure (!) 70/31, pulse 104, temperature 96.6 °F (35.9 °C), temperature source Esophageal, resp. rate 24, height 155 cm (61.02\"), weight 79.4 kg (175 lb), SpO2 100%.    HEENT: (Oral ET tube  NG tube  Right IJ triple-lumen)    Lungs:  There are rales.  No wheezes or rhonchi.    Heart: Normal rate.  Regular rhythm.  S1 normal and S2 normal.  No murmur, gallop or friction rub.   Chest: Symmetric chest wall expansion.   Abdomen: Abdomen is soft and non-distended.  Hypoactive bowel sounds.   " There is no mass. There is no splenomegaly. There is no hepatomegaly.   Extremities: There is no deformity or dependent edema.  (Arterial and venous sheaths in place)  Neurological: GCS score is 3.    Skin:  Cool.                I reviewed the patient's new clinical results.  I reviewed the patient's new imaging results/reports including actual images and agree with reports.    Cardiac Catheterization/Vascular Study  Result Date: 4/7/2025  Successful PTCA/stent placement of the ostial/proximal LAD using overlapping 3.5 x 18 mm Xience drug-eluting stents. Successful placement of an intra-aortic balloon pump RECOMMENDATIONS: Aggressive blood pressure support Aggressive risk factor modification Aspirin indefinitely Plavix for at least 6 months but preferably a year Wean balloon pump as blood pressure tolerates Wean pressors as blood pressure tolerates Indications: Acute anterior ST elevation MI.  The patient's presenting troponin was greater than 5000. Access: Right femoral artery/right femoral vein Estimated blood loss: Less than 15 mL Procedures: Left heart catheterization. Left ventriculogram. Selective coronary angiography. PTCA/stent placement of the ostial proximal LAD using overlapping 3.5 x 18 mm Xience drug-eluting stents IVUS of the LAD Intra-aortic balloon pump placement Procedure narrative: The patient was brought to the catheterization lab emergently.  She presented to the emergency room with shortness of breath and subsequently had a PEA arrest.  She responded to epinephrine.  Access site was prepped and draped in standard sterile fashion.  Lidocaine was injected and arterial access was obtained by percutaneous anterior wall puncture technique.  A 6 Kittitian arterial sheath was placed in the right femoral artery using a modified Seldinger technique.  Later in the case a 6 Kittitian sheath was placed in the right femoral vein for IV access.  Selective coronary arteriography was performed using the Kaushal  technique with a 6 Hebrew 4 curved Kaushal right catheter and a 6 Hebrew 4 curved Kaushal left catheter.  Nonionic contrast was used and was injected manually.  Left ventriculography was performed using 30 ML of contrast power injected at 10 ML per second.  Left heart pressure pull back revealed no gradient. At the time of the procedure the patient was noted to have an occluded LAD.  A 6 Hebrew Mach 1 JL 4 guide was placed to the level of the left main.  Heparin was administered for final ACT of 308 seconds.  2 boluses of Integrilin as well as a drip were administered.  A  one 5190 cm wire was placed across the LAD with minimal difficulty.  A 2.0 x 15 mm mini trek was placed within the proximal LAD and inflated to 10 karina for 20 seconds.  A 3.5 x 18 mm Xience drug-eluting stent was then placed within the proximal LAD and was deployed at 12 karina for 20 seconds.  Postdilation was performed using a 3.5 x 12 mm NC trek balloon inflated to 10 karina for 20 seconds distally and 16 karina for 20 seconds proximally.  A third inflation was performed near the ostium of the LAD at 8 karina for 30 seconds.  What appeared to be a thrombus at the origin of the LAD was only modified at that point and did not resolve.  IVUS imaging was performed and did not show any disruption of the LAD at the distal aspect of the stent.  It showed what appeared to be thrombus in the proximal portion of the LAD. The decision was made to place an overlapping 3.5 x 18 mm Xience drug-eluting stent across the large diagonal branch which did not appear to be involved in the disease.  The stent was placed and deployed at 14 karina for 20 seconds.  Postdilation was performed to 16 karina in overlapping fashion for 20 seconds with a second inflation to 16 akrina for 20 seconds proximally.  A good result was obtained. The wire was then withdrawn and placed down the large diagonal branch.  This was accomplished with minimal difficulty.  A 2.5 x 12 mm mini trek balloon was  placed across the stented segment and inflated to 12 karina for 30 seconds.  There were no complications.  The patient came to the Cath Lab on an epinephrine drip started in the emergency room.  Levophed was added.  A subsequent blood gas showed a metabolic acidosis and 2 A of bicarbonate were given after which the patient's blood pressure improved significantly.  As mentioned above a 6 Cypriot venous sheath had to be placed during the procedure for the administration of the epinephrine and Levophed. The patient's left ventriculogram showed an EF of 20-25%.  A large area of anterior apical and distal inferior akinesis was seen.  The decision was made to place a balloon pump to help the left ventricle recover.  This was performed without difficulty.  Contrast: 170 ml Hemodynamic Findings: LV pressure: 105/20/45 mmHg, on pull back no gradient was recorded across the aortic valve. Ao pressure: 105/75 mmHg Left ventriculography: Single-plane GREGORY left ventriculography showed a large area of anterior mid and distal as well as distal inferior akinesis.  The LVEF is estimated at 20-25%. Angiographic Findings: LMCA: The left main coronary artery gives rise to the LAD and circumflex vessels and is free of disease. Circumflex: Circumflex coronary is codominant for the posterior circulation and gives rise to 2 very small obtuse marginal branches, a large third obtuse marginal branch small fourth obtuse marginal branch moderate third obtuse marginal branch and moderate sixth obtuse marginal branch.  Only mild irregularities of less than 30% are seen in the circumflex. LAD: The LAD is occluded proximally after gives rise to a large diagonal branch. RCA: The right coronary artery is codominant for the posterior circulation and gives rise to the PDA.  The right coronary is small and contains no significant disease. PTCA/stent placement in the LAD: Preprocedure 100% occluded with ADÁN 0 flow Postprocedure no residual disease with ADÁN-3  flow Complications none     Intravascular Ultrasound  Result Date: 4/7/2025  Successful PTCA/stent placement of the ostial/proximal LAD using overlapping 3.5 x 18 mm Xience drug-eluting stents. Successful placement of an intra-aortic balloon pump RECOMMENDATIONS: Aggressive blood pressure support Aggressive risk factor modification Aspirin indefinitely Plavix for at least 6 months but preferably a year Wean balloon pump as blood pressure tolerates Wean pressors as blood pressure tolerates Indications: Acute anterior ST elevation MI.  The patient's presenting troponin was greater than 5000. Access: Right femoral artery/right femoral vein Estimated blood loss: Less than 15 mL Procedures: Left heart catheterization. Left ventriculogram. Selective coronary angiography. PTCA/stent placement of the ostial proximal LAD using overlapping 3.5 x 18 mm Xience drug-eluting stents IVUS of the LAD Intra-aortic balloon pump placement Procedure narrative: The patient was brought to the catheterization lab emergently.  She presented to the emergency room with shortness of breath and subsequently had a PEA arrest.  She responded to epinephrine.  Access site was prepped and draped in standard sterile fashion.  Lidocaine was injected and arterial access was obtained by percutaneous anterior wall puncture technique.  A 6 Iraqi arterial sheath was placed in the right femoral artery using a modified Seldinger technique.  Later in the case a 6 Iraqi sheath was placed in the right femoral vein for IV access.  Selective coronary arteriography was performed using the Kaushal technique with a 6 Iraqi 4 curved Kaushal right catheter and a 6 Iraqi 4 curved Kaushal left catheter.  Nonionic contrast was used and was injected manually.  Left ventriculography was performed using 30 ML of contrast power injected at 10 ML per second.  Left heart pressure pull back revealed no gradient. At the time of the procedure the patient was noted to have an  occluded LAD.  A 6 Amharic Mach 1 JL 4 guide was placed to the level of the left main.  Heparin was administered for final ACT of 308 seconds.  2 boluses of Integrilin as well as a drip were administered.  A  one 5190 cm wire was placed across the LAD with minimal difficulty.  A 2.0 x 15 mm mini trek was placed within the proximal LAD and inflated to 10 karina for 20 seconds.  A 3.5 x 18 mm Xience drug-eluting stent was then placed within the proximal LAD and was deployed at 12 karina for 20 seconds.  Postdilation was performed using a 3.5 x 12 mm NC trek balloon inflated to 10 karina for 20 seconds distally and 16 karina for 20 seconds proximally.  A third inflation was performed near the ostium of the LAD at 8 karina for 30 seconds.  What appeared to be a thrombus at the origin of the LAD was only modified at that point and did not resolve.  IVUS imaging was performed and did not show any disruption of the LAD at the distal aspect of the stent.  It showed what appeared to be thrombus in the proximal portion of the LAD. The decision was made to place an overlapping 3.5 x 18 mm Xience drug-eluting stent across the large diagonal branch which did not appear to be involved in the disease.  The stent was placed and deployed at 14 karina for 20 seconds.  Postdilation was performed to 16 karina in overlapping fashion for 20 seconds with a second inflation to 16 karina for 20 seconds proximally.  A good result was obtained. The wire was then withdrawn and placed down the large diagonal branch.  This was accomplished with minimal difficulty.  A 2.5 x 12 mm mini trek balloon was placed across the stented segment and inflated to 12 karina for 30 seconds.  There were no complications.  The patient came to the Cath Lab on an epinephrine drip started in the emergency room.  Levophed was added.  A subsequent blood gas showed a metabolic acidosis and 2 A of bicarbonate were given after which the patient's blood pressure improved significantly.  As  mentioned above a 6 Tajik venous sheath had to be placed during the procedure for the administration of the epinephrine and Levophed. The patient's left ventriculogram showed an EF of 20-25%.  A large area of anterior apical and distal inferior akinesis was seen.  The decision was made to place a balloon pump to help the left ventricle recover.  This was performed without difficulty.  Contrast: 170 ml Hemodynamic Findings: LV pressure: 105/20/45 mmHg, on pull back no gradient was recorded across the aortic valve. Ao pressure: 105/75 mmHg Left ventriculography: Single-plane GREGORY left ventriculography showed a large area of anterior mid and distal as well as distal inferior akinesis.  The LVEF is estimated at 20-25%. Angiographic Findings: LMCA: The left main coronary artery gives rise to the LAD and circumflex vessels and is free of disease. Circumflex: Circumflex coronary is codominant for the posterior circulation and gives rise to 2 very small obtuse marginal branches, a large third obtuse marginal branch small fourth obtuse marginal branch moderate third obtuse marginal branch and moderate sixth obtuse marginal branch.  Only mild irregularities of less than 30% are seen in the circumflex. LAD: The LAD is occluded proximally after gives rise to a large diagonal branch. RCA: The right coronary artery is codominant for the posterior circulation and gives rise to the PDA.  The right coronary is small and contains no significant disease. PTCA/stent placement in the LAD: Preprocedure 100% occluded with ADÁN 0 flow Postprocedure no residual disease with ADÁN-3 flow Complications none     CT Head Without Contrast  Result Date: 4/6/2025  Impression: No acute intracranial pathology. Electronically Signed: Donovan Michel MD  4/6/2025 5:54 PM EDT  Workstation ID: [id number]    XR Chest 1 View  Result Date: 4/6/2025  Impression:    Appropriate placement of the right internal jugular central venous catheter Electronically Signed:  Donovan Michel MD  4/6/2025 4:35 PM EDT  Workstation ID: [id number]    XR Chest 1 View  Result Date: 4/6/2025  Impression: 1. Medial left lower lobe airspace disease and suspected trace effusion. Electronically Signed: Jaylene Gagnon MD  4/6/2025 3:54 PM EDT  Workstation ID: [id number]       INFUSIONS  dexmedetomidine, 0.2-1.5 mcg/kg/hr  DOBUTamine, 2-20 mcg/kg/min, Last Rate: Stopped (04/07/25 0312)  EPINEPHrine, 0.02-0.3 mcg/kg/min, Last Rate: 0.3 mcg/kg/min (04/07/25 0516)  fentanyl 10 mcg/mL,  mcg/hr, Last Rate: 200 mcg/hr (04/07/25 0206)  heparin, 12 Units/kg/hr, Last Rate: 12 Units/kg/hr (04/07/25 1003)  insulin, 0-100 Units/hr, Last Rate: 6.2 Units/hr (04/07/25 1002)  norepinephrine, 0.02-0.5 mcg/kg/min, Last Rate: 0.5 mcg/kg/min (04/07/25 0958)  Pharmacy to Dose Heparin,   phenylephrine, 0.5-5 mcg/kg/min, Last Rate: 5 mcg/kg/min (04/07/25 0804)  propofol, 5-50 mcg/kg/min, Last Rate: Stopped (04/07/25 0207)  sodium bicarbonate 150 mEq in D5W, 75 mL/hr, Last Rate: 75 mL/hr (04/07/25 0116)  vasopressin, 0.03 Units/min, Last Rate: 0.03 Units/min (04/07/25 0432)        Results from last 7 days   Lab Units 04/07/25  0808 04/07/25  0303 04/06/25  2101   WBC 10*3/mm3 18.32* 18.71* 19.47*   HEMOGLOBIN g/dL 7.0* 8.9* 11.1*   HEMATOCRIT % 21.6* 27.5* 33.3*   PLATELETS 10*3/mm3 82* 135* 177     Results from last 7 days   Lab Units 04/07/25  0808 04/07/25  0303 04/07/25  0023 04/06/25  2101   SODIUM mmol/L 160* 156*  --  152*   POTASSIUM mmol/L 4.4 5.0 3.4* 2.4*   CHLORIDE mmol/L 117* 113*  --  106   CO2 mmol/L 7.0* 10.0*  --  13.0*   BUN mg/dL 25* 25*  --  24*   GLUCOSE mg/dL 223* 394*  --  283*   CREATININE mg/dL 2.65* 1.98*  --  1.82*   MAGNESIUM mg/dL 2.5* 2.7*  --  1.9   PHOSPHORUS mg/dL 4.4 3.9  --  1.0*   CALCIUM mg/dL 7.0* 7.3*  --  7.7*   ALBUMIN g/dL 3.0* 3.1*  --  3.1*         Results from last 7 days   Lab Units 04/07/25  0429 04/07/25  0030 04/06/25  1913 04/06/25  1520 04/06/25  1312   PH, ARTERIAL pH  units 7.199* 7.197* 7.192* 7.278* 7.092*   PCO2, ARTERIAL mm Hg 24.9* 27.5* 29.3* 30.8* 40.4   PO2 ART mm Hg 74.4* 72.4* 143.0* 79.7* 234.0*   FIO2 % 40 40 90 100 100       Patient isn't on Tube Feeding   /h  Patient doesn't have any tube feeding modular orders    Mechanical Ventilator:   Settings: Observed:   Mode: VC+/AC (04/07/25 0708)    Vt (Set, mL): 420 mL (04/07/25 0708) Vt Mandatory Ins (observed, mL): 413 mL (04/07/25 0708)   Resp Rate (Set): 24 (04/07/25 0708) Resp Rate (Observed) Vent: 24 (04/07/25 1000)     Minute Ventilation (L/min) (Obs): 9.69 L/min (04/07/25 0708)       FiO2 (%): 40 % (04/07/25 0708) Plateau Pressure (cm H2O): (S) 28 cm H2O (04/07/25 0708)   PEEP/CPAP (cm H2O): 5 cm H20 (04/07/25 0708) I:E Ratio (Obs): 1:1.8 (04/07/25 0708)         I reviewed the patient's medications.    Assessment & Plan   ASSESSMENT/PLAN     Active Hospital Problems    Diagnosis     **STEMI involving left anterior descending coronary artery     Cardiac arrest     Acute respiratory failure with hypoxia     Metabolic acidosis     Cardiogenic shock     Acute kidney injury        70-year-old female with a past medical history significant for hypertension, dyslipidemia, and GERD.  Reportedly she was complaining of some chest symptoms, possibly reflux on the day of admission and her grandson was to bring her to the ER but she collapsed and became unresponsive.  EMS was called and the patient was initially found in PEA arrest.  CPR was initiated and the patient was transported to our ER though time to ROSC was estimated at 45 minutes per the chart.  EKG revealed findings consistent with STEMI.  She underwent LAD intervention which was the culprit lesion.  IABP placed in the Cath Lab.  She was unresponsive and admitted to ICU for targeted temperature management.    Overnight she has had worsening shock and pressors have been escalated to their limits.  She has had ongoing lack of any meaningful response to stimuli.  GCS is  3.  A discussion overnight with the patient's daughter who is her POA resulted in a DNR status with nonescalation of care due to her very poor prognosis.  She likely has significant/severe anoxic encephalopathy though even more acutely she has refractory shock, metabolic acidosis, and renal failure.    Plan:    Continue current measures while we are awaiting the arrival of family  No escalation of care  DNR status  Consider de-escalation of care including compassionate withdrawal of any futile interventions in consultation with the family     I discussed the patient's findings and my recommendations with nursing staff     Plan of care and goals reviewed with multidisciplinary team at daily rounds.    Any copied data from previous notes included in the (1) History of Present Illness, (2) Physical Examination and (3) Medical Decision Making and/or Assessment and Plan has been reviewed and is accurate as of 04/07/25    Critical Care time spent in direct patient care: 35 minutes (excluding procedure time, if applicable) including high complexity decision making to assess, manipulate, and support vital organ system failure in this individual who has impairment of one or more vital organ systems such that there is a high probability of imminent or life threatening deterioration in the patient's condition.    Harinder Jo MD  Pulmonary and Critical Care Medicine  04/07/25 10:20 EDT        Electronically signed by Harinder Jo MD at 04/07/25 1027       Martita Connor PA-C at 04/07/25 1011            [facility]  PROGRESS NOTE    Date of Admission: 4/6/2025  Date of Service: 04/07/25    Primary Care Physician: Fiona Fitch PA-C    Chief Complaint: follow up STEMI    Problem List:   STEMI involving left anterior descending coronary artery    Cardiac arrest    Acute respiratory failure with hypoxia    Metabolic acidosis    Cardiogenic shock    Acute kidney  "injury      Subjective      Remains hypotensive despite 4 pressors. Has been made DNR. Daughter at bedside at time of my visit. Verifies wishes of no escalation of care.     Objective   Vitals: BP (!) 70/31   Pulse 104   Temp 96.4 °F (35.8 °C) (Esophageal)   Resp 24   Ht 155 cm (61.02\")   Wt 79.4 kg (175 lb)   SpO2 100%   BMI 33.04 kg/m²     Physical Exam:  GENERAL: sedated and intubated.   HEENT: no jugular venous distention  HEART: Regular rhythm, normal rate  LUNGS: mechanical ventilation via ET tube.     Results:  Results from last 7 days   Lab Units 04/07/25  0808 04/07/25  0303 04/06/25  2101   WBC 10*3/mm3 18.32* 18.71* 19.47*   HEMOGLOBIN g/dL 7.0* 8.9* 11.1*   HEMATOCRIT % 21.6* 27.5* 33.3*   PLATELETS 10*3/mm3 82* 135* 177     Results from last 7 days   Lab Units 04/07/25  0808 04/07/25  0303 04/07/25  0023 04/06/25  2101   SODIUM mmol/L 160* 156*  --  152*   POTASSIUM mmol/L 4.4 5.0 3.4* 2.4*   CHLORIDE mmol/L 117* 113*  --  106   CO2 mmol/L 7.0* 10.0*  --  13.0*   BUN mg/dL 25* 25*  --  24*   CREATININE mg/dL 2.65* 1.98*  --  1.82*   GLUCOSE mg/dL 223* 394*  --  283*      Lab Results   Component Value Date    CHOL 45 04/07/2025    TRIG 135 04/07/2025    HDL 15 (L) 04/07/2025    LDL 6 04/07/2025     (H) 04/07/2025     (H) 04/07/2025         Results from last 7 days   Lab Units 04/07/25  0503   CHOLESTEROL mg/dL 45   TRIGLYCERIDES mg/dL 135   HDL CHOL mg/dL 15*   LDL CHOL mg/dL 6             Results from last 7 days   Lab Units 04/07/25  0808 04/07/25  0303 04/06/25  2101 04/06/25  1623 04/06/25  1144   PROTIME Seconds  --   --   --  18.9* 15.2   INR   --   --   --  1.48* 1.13*   APTT seconds 139.8* 142.4* 164.3* 136.9* 30.2*     Results from last 7 days   Lab Units 04/06/25  1623 04/06/25  1144   CK TOTAL U/L  --  4,179*   HSTROP T ng/L >10,000* 5,252*             Intake/Output Summary (Last 24 hours) at 4/7/2025 1012  Last data filed at 4/7/2025 0900  Gross per 24 hour   Intake " 4774.94 ml   Output 740 ml   Net 4034.94 ml       I personally reviewed the patient's EKG/Telemetry data    Radiology Data:   Results for orders placed during the hospital encounter of 09/18/24    Adult Transthoracic Echo Complete w/ Color, Spectral and Contrast if necessary per protocol    Interpretation Summary    Left ventricular systolic function is normal. Estimated left ventricular EF = 60%    Left ventricular diastolic function is consistent with (grade I) impaired relaxation.    Trace mitral regurgitation.    Mild tricuspid regurgitation with normal RVSP.        Current Medications:  albumin human, 25 g, Intravenous, Q6H  artificial tears, , Both Eyes, Q2H  atorvastatin, 10 mg, Nasogastric, Nightly  clopidogrel, 75 mg, Nasogastric, Daily  hydrocortisone sodium succinate, 100 mg, Intravenous, Q8H  mupirocin, 1 Application, Each Nare, BID  pharmacy consult - [place], , Not Applicable, Daily  piperacillin-tazobactam, 4.5 g, Intravenous, Q8H  senna-docusate sodium, 2 tablet, Nasogastric, BID      dexmedetomidine, 0.2-1.5 mcg/kg/hr  DOBUTamine, 2-20 mcg/kg/min, Last Rate: Stopped (04/07/25 0312)  EPINEPHrine, 0.02-0.3 mcg/kg/min, Last Rate: 0.3 mcg/kg/min (04/07/25 0516)  fentanyl 10 mcg/mL,  mcg/hr, Last Rate: 200 mcg/hr (04/07/25 0206)  heparin, 12 Units/kg/hr, Last Rate: 12 Units/kg/hr (04/07/25 1003)  insulin, 0-100 Units/hr, Last Rate: 6.2 Units/hr (04/07/25 1002)  norepinephrine, 0.02-0.5 mcg/kg/min, Last Rate: 0.5 mcg/kg/min (04/07/25 0958)  Pharmacy to Dose Heparin,   phenylephrine, 0.5-5 mcg/kg/min, Last Rate: 5 mcg/kg/min (04/07/25 0804)  propofol, 5-50 mcg/kg/min, Last Rate: Stopped (04/07/25 0207)  sodium bicarbonate 150 mEq in D5W, 75 mL/hr, Last Rate: 75 mL/hr (04/07/25 0116)  vasopressin, 0.03 Units/min, Last Rate: 0.03 Units/min (04/07/25 0432)        Assessment:   PEA arrest/anterior MI/cardiogenic shock  Emergent LHC: successful PTCA/LEIGH to occluded proximal LAD.   Prolonged resuscitation (40+  minutes per chart review). No purposeful movement following CODE blue.   Balloon pump placed. On multiple pressors.   Ischemic cardiomyopathy, EF 20-25% during [facility]  History of hypertension now with hypotension requiring multiple pressors  Dyslipidemia  Anemia, hgb 7.0  DNR    Plan:   Patient has been made a DNR and family has elected for no escalation of care. Cardiology will sign off. Please call with any questions.       Martita Connor PA-C            Electronically signed by Martita Connor PA-C at 04/07/25 1324       Adrien Marshall MD at 04/06/25 0273            Intensive Care consultation     Patient is being seen for ICU management in the aftermath of ST elevation myocardial infarction, cardiac arrest, and acute respiratory failure    History of Present Illness     Falguni Laureano is a 70 y.o. female with past medical history significant for hypertension, hyperlipidemia, and indigestion. History is taken via chart review as patient is intubated on mechanical ventilation. Patient had been complaining of acid reflux since yesterday afternoon. Patient's grandson was bringing patient to ED earlier today. However, patient collapsed and became unresponsive. EMS was called and patient was found to be in PEA arrest. CPR was initiated and patient received 2 rounds epinephrine and was intubated en route to ED. On arrival, patient was given additional dose of epinephrine and ROSC was achieved. Time to ROSC roughly 45 minutes; see CODE sheet for details. EKG was obtained and showed ischemic changes in anteroseptal leads concerning for STEMI. Case was discussed with interventional cardiology and patient was taken emergently to cath lab. Per chart, patient had no purposeful movement following CODE BLUE. She was started on epinephrine infusion. Labs in ED were significant for troponin 5252, potassium 3.3, creatinine 1.50.      Time spent 10 minutes  Electronically signed by Precious Martínez PA-C, 04/06/25, 3:51 PM  EDT.     The patient was seen upon arrival to the intensive care unit.  I was able to discuss the case with Dr. Finnegan after catheterization.  She underwent intervention to the LAD which was the culprit lesion.  She is brought to the ICU in an intubated state on a balloon pump at one-to-one augmentation and currently on norepinephrine and epinephrine.  Sedation is currently off.  The patient is on 100% FiO2 with a PEEP of 5.  She is nonresponsive to verbal command.  She is occasionally having some jerks in her extremities though not consistently.    Problem List, Surgical History, Family, Social History, and ROS     STEMI involving left anterior descending coronary artery    Cardiac arrest    Acute respiratory failure with hypoxia    Metabolic acidosis    Cardiogenic shock    Acute kidney injury     Past Surgical History:   Procedure Laterality Date    HIP FRACTURE SURGERY  2020       No Known Allergies  No current facility-administered medications on file prior to encounter.     Current Outpatient Medications on File Prior to Encounter   Medication Sig    acetaminophen (TYLENOL) 500 MG tablet Take 1 tablet by mouth Every 6 (Six) Hours As Needed for Mild Pain. Indications: Pain    amLODIPine (NORVASC) 10 MG tablet TAKE 1 TABLET BY MOUTH DAILY    atorvastatin (LIPITOR) 10 MG tablet TAKE 1 TABLET BY MOUTH EVERY NIGHT    calcium carbonate (OS-EVELIO) 600 MG tablet Take 1 tablet by mouth Daily. Indications: Low Amount of Calcium in the Blood    lisinopril (PRINIVIL,ZESTRIL) 40 MG tablet Take 1 tablet by mouth Daily.    metoprolol tartrate (LOPRESSOR) 100 MG tablet TAKE 1 TABLET BY MOUTH TWICE DAILY    omeprazole (priLOSEC) 40 MG capsule TAKE 1 CAPSULE BY MOUTH DAILY FOR HEARTBURN     MEDICATION LIST AND ALLERGIES REVIEWED.    Family History   Problem Relation Age of Onset    No Known Problems Mother     No Known Problems Father     No Known Problems Daughter     No Known Problems Maternal Grandmother     No Known  "Problems Paternal Grandmother     No Known Problems Maternal Aunt     Breast cancer Paternal Aunt     No Known Problems Maternal Grandfather     No Known Problems Paternal Grandfather     No Known Problems Maternal Uncle     No Known Problems Paternal Uncle     Ovarian cancer Neg Hx      Social History     Tobacco Use    Smoking status: Never     Passive exposure: Never    Smokeless tobacco: Never   Vaping Use    Vaping status: Never Used   Substance Use Topics    Alcohol use: Not Currently    Drug use: Never     Social History     Social History Narrative    Not on file     FAMILY AND SOCIAL HISTORY REVIEWED.    Review of Systems  To obtain review of systems secondary to the patient's comatose state.    Physical Exam and Clinical Information   BP 96/63   Pulse 98   Temp 94.8 °F (34.9 °C) (Esophageal)   Resp 17   Ht 155 cm (61.02\")   Wt 79.4 kg (175 lb)   SpO2 93%   BMI 33.04 kg/m²   Physical Exam  Vitals reviewed.   Constitutional:       Comments: Nonresponsive, intubated   HENT:      Head: Normocephalic and atraumatic.      Mouth/Throat:      Mouth: Mucous membranes are moist.   Eyes:      Comments: Pupils are equal bilaterally and approximately 2 mm.  They do not appear to be reactive to light at this time.   Cardiovascular:      Rate and Rhythm: Normal rate and regular rhythm.      Pulses: Normal pulses.      Heart sounds: No murmur heard.  Pulmonary:      Effort: Pulmonary effort is normal. No respiratory distress.      Breath sounds: Normal breath sounds. No stridor. No wheezing, rhonchi or rales.   Abdominal:      General: Abdomen is flat. Bowel sounds are normal. There is no distension.      Palpations: Abdomen is soft.   Musculoskeletal:         General: No swelling.      Right lower leg: No edema.      Left lower leg: No edema.      Comments: Right femoral arterial sheath/arterial line with IABP  Right femoral venous sheath   Neurological:      Comments: Eye exam as before.  Patient does currently " "have a gag and cough.         Results from last 7 days   Lab Units 04/06/25  1148 04/06/25  1144   WBC 10*3/mm3  --  8.05   HEMOGLOBIN g/dL  --  14.6   HEMOGLOBIN, POC g/dL 15.6  --    PLATELETS 10*3/mm3  --  217     Results from last 7 days   Lab Units 04/06/25  1148 04/06/25  1144 03/31/25  1050   SODIUM mmol/L  --   --  139   POTASSIUM mmol/L  --   --  4.6   CO2 mmol/L  --   --  26.0   BUN mg/dL  --   --  11   CREATININE mg/dL 1.50*  --  1.02*   MAGNESIUM mg/dL  --  2.4  --    GLUCOSE mg/dL  --   --  92     Estimated Creatinine Clearance: 32.9 mL/min (A) (by C-G formula based on SCr of 1.5 mg/dL (H)).      Results from last 7 days   Lab Units 04/06/25  1520   PH, ARTERIAL pH units 7.278*   PCO2, ARTERIAL mm Hg 30.8*   PO2 ART mm Hg 79.7*     No results found for: \"LACTATE\"       I reviewed the patient's results and images.     Impression     STEMI involving left anterior descending coronary artery    Cardiac arrest    Acute respiratory failure with hypoxia    Metabolic acidosis    Cardiogenic shock    Acute kidney injury        Plan/Recommendations     Monitor closely in the intensive care unit.  Given her lack of response in the aftermath of her arrest, we will proceed with targeted temperature management with a goal of 36 °F.  Continue with augmentation with balloon pump as well as pressors as before.  We will escalate and de-escalate as able.  Maintain current ventilatory support.  Will wean the FiO2 as able.  Lungs currently appear to be relatively clear on the x-ray.  We will check intermittent blood gases and adjust as necessary.  The patient has received some bicarbonate.  We may need to redose her if her metabolic acidosis worsens.  Monitor renal function very closely.  I suspect we will see a worsening of her renal function given her dye load and hypotension.  If she were to worsen we will involve the nephrology service for renal replacement therapy.  I think there is a strong possibility that she has " suffered severe anoxic injury given her prolonged resuscitation.  We will assess for recovery within the next 24 to 48 hours.  The patient is currently critically ill and at imminent risk of death.    High level of risk due to:  drug(s) requiring intensive monitoring for toxicity, parenteral controlled substances, and decision regarding hospitalization or escalation of level of hospital care.    Time spent Critical care 41 min (exclusive of procedure time)  including high complexity decision making to assess, manipulate, and support vital organ system failure in this individual who has impairment of one or more vital organ systems such that there is a high probability of imminent or life threatening deterioration in the patient’s condition.      Adrien Marshall MD, [facility]  Pulmonary and Critical Care Medicine  04/06/25 16:16 EDT     CC: Fiona Fitch, PA-C     Electronically signed by Adrien Marshall MD at 04/06/25 1625       Consult Notes (last 24 hours)  Notes from 04/06/25 1337 through 04/07/25 1337   No notes of this type exist for this encounter.

## 2025-04-07 NOTE — ACP (ADVANCE CARE PLANNING)
"Discussion was held with patient's daughter via telephone (Elma Johnson - [phone]) with regard to the patient's current status and prognosis.  Patient is intubated with IABP and four pressors, with continued decline in hemodynamics. Reported cardiac arrest yesterday with prolonged downtime (40+ minutes). Overall prognosis is poor and additional resuscitative measures are felt to be futile.    Patient's daughter is in agreement with DNR (do not resuscitate) status - will continue current life support measures without escalation of care (no new measures - continue/adjust current therapies only). If patient declines, daughter is in agreement that she will be permitted to \"pass peacefully\".    BETH Sadler, [facility]-BC  Critical Care Medicine        "

## 2025-04-07 NOTE — PROGRESS NOTES
"  [facility] at [facility]  PROGRESS NOTE    Date of Admission: 4/6/2025  Date of Service: 04/07/25    Primary Care Physician: Fiona Fitch PA-C    Chief Complaint: follow up STEMI    Problem List:   STEMI involving left anterior descending coronary artery    Cardiac arrest    Acute respiratory failure with hypoxia    Metabolic acidosis    Cardiogenic shock    Acute kidney injury      Subjective      Remains hypotensive despite 4 pressors. Has been made DNR. Daughter at bedside at time of my visit. Verifies wishes of no escalation of care.     Objective   Vitals: BP (!) 70/31   Pulse 104   Temp 96.4 °F (35.8 °C) (Esophageal)   Resp 24   Ht 155 cm (61.02\")   Wt 79.4 kg (175 lb)   SpO2 100%   BMI 33.04 kg/m²     Physical Exam:  GENERAL: sedated and intubated.   HEENT: no jugular venous distention  HEART: Regular rhythm, normal rate  LUNGS: mechanical ventilation via ET tube.     Results:  Results from last 7 days   Lab Units 04/07/25  0808 04/07/25  0303 04/06/25  2101   WBC 10*3/mm3 18.32* 18.71* 19.47*   HEMOGLOBIN g/dL 7.0* 8.9* 11.1*   HEMATOCRIT % 21.6* 27.5* 33.3*   PLATELETS 10*3/mm3 82* 135* 177     Results from last 7 days   Lab Units 04/07/25  0808 04/07/25  0303 04/07/25  0023 04/06/25  2101   SODIUM mmol/L 160* 156*  --  152*   POTASSIUM mmol/L 4.4 5.0 3.4* 2.4*   CHLORIDE mmol/L 117* 113*  --  106   CO2 mmol/L 7.0* 10.0*  --  13.0*   BUN mg/dL 25* 25*  --  24*   CREATININE mg/dL 2.65* 1.98*  --  1.82*   GLUCOSE mg/dL 223* 394*  --  283*      Lab Results   Component Value Date    CHOL 45 04/07/2025    TRIG 135 04/07/2025    HDL 15 (L) 04/07/2025    LDL 6 04/07/2025     (H) 04/07/2025     (H) 04/07/2025         Results from last 7 days   Lab Units 04/07/25  0503   CHOLESTEROL mg/dL 45   TRIGLYCERIDES mg/dL 135   HDL CHOL mg/dL 15*   LDL CHOL mg/dL 6             Results from last 7 days   Lab Units 04/07/25  0808 04/07/25  0303 04/06/25  2101 04/06/25  1623 " 04/06/25  1144   PROTIME Seconds  --   --   --  18.9* 15.2   INR   --   --   --  1.48* 1.13*   APTT seconds 139.8* 142.4* 164.3* 136.9* 30.2*     Results from last 7 days   Lab Units 04/06/25  1623 04/06/25  1144   CK TOTAL U/L  --  4,179*   HSTROP T ng/L >10,000* 5,252*             Intake/Output Summary (Last 24 hours) at 4/7/2025 1012  Last data filed at 4/7/2025 0900  Gross per 24 hour   Intake 4774.94 ml   Output 740 ml   Net 4034.94 ml       I personally reviewed the patient's EKG/Telemetry data    Radiology Data:   Results for orders placed during the hospital encounter of 09/18/24    Adult Transthoracic Echo Complete w/ Color, Spectral and Contrast if necessary per protocol    Interpretation Summary    Left ventricular systolic function is normal. Estimated left ventricular EF = 60%    Left ventricular diastolic function is consistent with (grade I) impaired relaxation.    Trace mitral regurgitation.    Mild tricuspid regurgitation with normal RVSP.        Current Medications:  albumin human, 25 g, Intravenous, Q6H  artificial tears, , Both Eyes, Q2H  atorvastatin, 10 mg, Nasogastric, Nightly  clopidogrel, 75 mg, Nasogastric, Daily  hydrocortisone sodium succinate, 100 mg, Intravenous, Q8H  mupirocin, 1 Application, Each Nare, BID  pharmacy consult - [place], , Not Applicable, Daily  piperacillin-tazobactam, 4.5 g, Intravenous, Q8H  senna-docusate sodium, 2 tablet, Nasogastric, BID      dexmedetomidine, 0.2-1.5 mcg/kg/hr  DOBUTamine, 2-20 mcg/kg/min, Last Rate: Stopped (04/07/25 0312)  EPINEPHrine, 0.02-0.3 mcg/kg/min, Last Rate: 0.3 mcg/kg/min (04/07/25 0516)  fentanyl 10 mcg/mL,  mcg/hr, Last Rate: 200 mcg/hr (04/07/25 0206)  heparin, 12 Units/kg/hr, Last Rate: 12 Units/kg/hr (04/07/25 1003)  insulin, 0-100 Units/hr, Last Rate: 6.2 Units/hr (04/07/25 1002)  norepinephrine, 0.02-0.5 mcg/kg/min, Last Rate: 0.5 mcg/kg/min (04/07/25 0958)  Pharmacy to Dose Heparin,   phenylephrine, 0.5-5 mcg/kg/min, Last  Rate: 5 mcg/kg/min (04/07/25 0804)  propofol, 5-50 mcg/kg/min, Last Rate: Stopped (04/07/25 0207)  sodium bicarbonate 150 mEq in D5W, 75 mL/hr, Last Rate: 75 mL/hr (04/07/25 0116)  vasopressin, 0.03 Units/min, Last Rate: 0.03 Units/min (04/07/25 0432)        Assessment:   PEA arrest/anterior MI/cardiogenic shock  Emergent LHC: successful PTCA/LEIGH to occluded proximal LAD.   Prolonged resuscitation (40+ minutes per chart review). No purposeful movement following CODE blue.   Balloon pump placed. On multiple pressors.   Ischemic cardiomyopathy, EF 20-25% during LHC  History of hypertension now with hypotension requiring multiple pressors  Dyslipidemia  Anemia, hgb 7.0  DNR    Plan:   Patient has been made a DNR and family has elected for no escalation of care. Intensivist managing pressors and ventilation.       Martita Connor PA-C

## 2025-04-07 NOTE — PROGRESS NOTES
"INTENSIVIST NOTE     Hospital Day: 1    Ms. Falguni Laureano, 70 y.o. female is followed for:   Out-of-hospital cardiac arrest, shock, and anoxic encephalopathy       SUBJECTIVE     Interval history:    Mechanical ventilation with rate 24, tidal volume 420, FiO2 40%, and PEEP 5 multiple drips including bicarbonate, vasopressin, epinephrine, norepinephrine, Manoj-Synephrine, heparin, fentanyl and insulin.  IABP remains in place 1:1.  Grandson at bedside    The patient's relevant past medical, surgical and social history were reviewed and updated in Epic as appropriate.       OBJECTIVE     Vital Sign Min/Max for last 24 hours  Temp  Min: 89.6 °F (32 °C)  Max: 97.5 °F (36.4 °C)   BP  Min: 70/31  Max: 125/64   Pulse  Min: 84  Max: 144   Resp  Min: 14  Max: 24   SpO2  Min: 85 %  Max: 100 %   No data recorded   Weight  Min: 79.4 kg (175 lb)  Max: 79.4 kg (175 lb)      Intake/Output Summary (Last 24 hours) at 4/7/2025 1020  Last data filed at 4/7/2025 1000  Gross per 24 hour   Intake 4774.94 ml   Output 750 ml   Net 4024.94 ml      Flowsheet Rows      Flowsheet Row First Filed Value   Admission Height 155 cm (61.02\") Documented at 04/06/2025 1154   Admission Weight 79.4 kg (175 lb) Documented at 04/06/2025 1154               04/06/25  1154   Weight: 79.4 kg (175 lb)            Objective:  Vital signs: (most recent): Blood pressure (!) 70/31, pulse 104, temperature 96.6 °F (35.9 °C), temperature source Esophageal, resp. rate 24, height 155 cm (61.02\"), weight 79.4 kg (175 lb), SpO2 100%.    HEENT: (Oral ET tube  NG tube  Right IJ triple-lumen)    Lungs:  There are rales.  No wheezes or rhonchi.    Heart: Normal rate.  Regular rhythm.  S1 normal and S2 normal.  No murmur, gallop or friction rub.   Chest: Symmetric chest wall expansion.   Abdomen: Abdomen is soft and non-distended.  Hypoactive bowel sounds.   There is no mass. There is no splenomegaly. There is no hepatomegaly.   Extremities: There is no deformity or dependent " edema.  (Arterial and venous sheaths in place)  Neurological: GCS score is 3.    Skin:  Cool.                I reviewed the patient's new clinical results.  I reviewed the patient's new imaging results/reports including actual images and agree with reports.    Cardiac Catheterization/Vascular Study  Result Date: 4/7/2025  Successful PTCA/stent placement of the ostial/proximal LAD using overlapping 3.5 x 18 mm Xience drug-eluting stents. Successful placement of an intra-aortic balloon pump RECOMMENDATIONS: Aggressive blood pressure support Aggressive risk factor modification Aspirin indefinitely Plavix for at least 6 months but preferably a year Wean balloon pump as blood pressure tolerates Wean pressors as blood pressure tolerates Indications: Acute anterior ST elevation MI.  The patient's presenting troponin was greater than 5000. Access: Right femoral artery/right femoral vein Estimated blood loss: Less than 15 mL Procedures: Left heart catheterization. Left ventriculogram. Selective coronary angiography. PTCA/stent placement of the ostial proximal LAD using overlapping 3.5 x 18 mm Xience drug-eluting stents IVUS of the LAD Intra-aortic balloon pump placement Procedure narrative: The patient was brought to the catheterization lab emergently.  She presented to the emergency room with shortness of breath and subsequently had a PEA arrest.  She responded to epinephrine.  Access site was prepped and draped in standard sterile fashion.  Lidocaine was injected and arterial access was obtained by percutaneous anterior wall puncture technique.  A 6 Bangladeshi arterial sheath was placed in the right femoral artery using a modified Seldinger technique.  Later in the case a 6 Bangladeshi sheath was placed in the right femoral vein for IV access.  Selective coronary arteriography was performed using the Kaushal technique with a 6 Bangladeshi 4 curved Kaushal right catheter and a 6 Bangladeshi 4 curved Kaushal left catheter.  Nonionic contrast  was used and was injected manually.  Left ventriculography was performed using 30 ML of contrast power injected at 10 ML per second.  Left heart pressure pull back revealed no gradient. At the time of the procedure the patient was noted to have an occluded LAD.  A 6 Hungarian Mach 1 JL 4 guide was placed to the level of the left main.  Heparin was administered for final ACT of 308 seconds.  2 boluses of Integrilin as well as a drip were administered.  A  one 5190 cm wire was placed across the LAD with minimal difficulty.  A 2.0 x 15 mm mini trek was placed within the proximal LAD and inflated to 10 karina for 20 seconds.  A 3.5 x 18 mm Xience drug-eluting stent was then placed within the proximal LAD and was deployed at 12 karina for 20 seconds.  Postdilation was performed using a 3.5 x 12 mm NC trek balloon inflated to 10 karina for 20 seconds distally and 16 karina for 20 seconds proximally.  A third inflation was performed near the ostium of the LAD at 8 karina for 30 seconds.  What appeared to be a thrombus at the origin of the LAD was only modified at that point and did not resolve.  IVUS imaging was performed and did not show any disruption of the LAD at the distal aspect of the stent.  It showed what appeared to be thrombus in the proximal portion of the LAD. The decision was made to place an overlapping 3.5 x 18 mm Xience drug-eluting stent across the large diagonal branch which did not appear to be involved in the disease.  The stent was placed and deployed at 14 karina for 20 seconds.  Postdilation was performed to 16 karina in overlapping fashion for 20 seconds with a second inflation to 16 karina for 20 seconds proximally.  A good result was obtained. The wire was then withdrawn and placed down the large diagonal branch.  This was accomplished with minimal difficulty.  A 2.5 x 12 mm mini trek balloon was placed across the stented segment and inflated to 12 karina for 30 seconds.  There were no complications.  The patient came to  the Cath Lab on an epinephrine drip started in the emergency room.  Levophed was added.  A subsequent blood gas showed a metabolic acidosis and 2 A of bicarbonate were given after which the patient's blood pressure improved significantly.  As mentioned above a 6 Croatian venous sheath had to be placed during the procedure for the administration of the epinephrine and Levophed. The patient's left ventriculogram showed an EF of 20-25%.  A large area of anterior apical and distal inferior akinesis was seen.  The decision was made to place a balloon pump to help the left ventricle recover.  This was performed without difficulty.  Contrast: 170 ml Hemodynamic Findings: LV pressure: 105/20/45 mmHg, on pull back no gradient was recorded across the aortic valve. Ao pressure: 105/75 mmHg Left ventriculography: Single-plane GREGORY left ventriculography showed a large area of anterior mid and distal as well as distal inferior akinesis.  The LVEF is estimated at 20-25%. Angiographic Findings: LMCA: The left main coronary artery gives rise to the LAD and circumflex vessels and is free of disease. Circumflex: Circumflex coronary is codominant for the posterior circulation and gives rise to 2 very small obtuse marginal branches, a large third obtuse marginal branch small fourth obtuse marginal branch moderate third obtuse marginal branch and moderate sixth obtuse marginal branch.  Only mild irregularities of less than 30% are seen in the circumflex. LAD: The LAD is occluded proximally after gives rise to a large diagonal branch. RCA: The right coronary artery is codominant for the posterior circulation and gives rise to the PDA.  The right coronary is small and contains no significant disease. PTCA/stent placement in the LAD: Preprocedure 100% occluded with ADÁN 0 flow Postprocedure no residual disease with ADÁN-3 flow Complications none     Intravascular Ultrasound  Result Date: 4/7/2025  Successful PTCA/stent placement of the  ostial/proximal LAD using overlapping 3.5 x 18 mm Xience drug-eluting stents. Successful placement of an intra-aortic balloon pump RECOMMENDATIONS: Aggressive blood pressure support Aggressive risk factor modification Aspirin indefinitely Plavix for at least 6 months but preferably a year Wean balloon pump as blood pressure tolerates Wean pressors as blood pressure tolerates Indications: Acute anterior ST elevation MI.  The patient's presenting troponin was greater than 5000. Access: Right femoral artery/right femoral vein Estimated blood loss: Less than 15 mL Procedures: Left heart catheterization. Left ventriculogram. Selective coronary angiography. PTCA/stent placement of the ostial proximal LAD using overlapping 3.5 x 18 mm Xience drug-eluting stents IVUS of the LAD Intra-aortic balloon pump placement Procedure narrative: The patient was brought to the catheterization lab emergently.  She presented to the emergency room with shortness of breath and subsequently had a PEA arrest.  She responded to epinephrine.  Access site was prepped and draped in standard sterile fashion.  Lidocaine was injected and arterial access was obtained by percutaneous anterior wall puncture technique.  A 6 Nigerien arterial sheath was placed in the right femoral artery using a modified Seldinger technique.  Later in the case a 6 Nigerien sheath was placed in the right femoral vein for IV access.  Selective coronary arteriography was performed using the Kaushal technique with a 6 Nigerien 4 curved Kaushal right catheter and a 6 Nigerien 4 curved Kaushal left catheter.  Nonionic contrast was used and was injected manually.  Left ventriculography was performed using 30 ML of contrast power injected at 10 ML per second.  Left heart pressure pull back revealed no gradient. At the time of the procedure the patient was noted to have an occluded LAD.  A 6 Nigerien Mach 1 JL 4 guide was placed to the level of the left main.  Heparin was administered for  final ACT of 308 seconds.  2 boluses of Integrilin as well as a drip were administered.  A  one 5190 cm wire was placed across the LAD with minimal difficulty.  A 2.0 x 15 mm mini trek was placed within the proximal LAD and inflated to 10 karina for 20 seconds.  A 3.5 x 18 mm Xience drug-eluting stent was then placed within the proximal LAD and was deployed at 12 karina for 20 seconds.  Postdilation was performed using a 3.5 x 12 mm NC trek balloon inflated to 10 karina for 20 seconds distally and 16 karina for 20 seconds proximally.  A third inflation was performed near the ostium of the LAD at 8 karina for 30 seconds.  What appeared to be a thrombus at the origin of the LAD was only modified at that point and did not resolve.  IVUS imaging was performed and did not show any disruption of the LAD at the distal aspect of the stent.  It showed what appeared to be thrombus in the proximal portion of the LAD. The decision was made to place an overlapping 3.5 x 18 mm Xience drug-eluting stent across the large diagonal branch which did not appear to be involved in the disease.  The stent was placed and deployed at 14 karina for 20 seconds.  Postdilation was performed to 16 karina in overlapping fashion for 20 seconds with a second inflation to 16 karina for 20 seconds proximally.  A good result was obtained. The wire was then withdrawn and placed down the large diagonal branch.  This was accomplished with minimal difficulty.  A 2.5 x 12 mm mini trek balloon was placed across the stented segment and inflated to 12 karina for 30 seconds.  There were no complications.  The patient came to the Cath Lab on an epinephrine drip started in the emergency room.  Levophed was added.  A subsequent blood gas showed a metabolic acidosis and 2 A of bicarbonate were given after which the patient's blood pressure improved significantly.  As mentioned above a 6 Zimbabwean venous sheath had to be placed during the procedure for the administration of the epinephrine  and Levophed. The patient's left ventriculogram showed an EF of 20-25%.  A large area of anterior apical and distal inferior akinesis was seen.  The decision was made to place a balloon pump to help the left ventricle recover.  This was performed without difficulty.  Contrast: 170 ml Hemodynamic Findings: LV pressure: 105/20/45 mmHg, on pull back no gradient was recorded across the aortic valve. Ao pressure: 105/75 mmHg Left ventriculography: Single-plane GREGORY left ventriculography showed a large area of anterior mid and distal as well as distal inferior akinesis.  The LVEF is estimated at 20-25%. Angiographic Findings: LMCA: The left main coronary artery gives rise to the LAD and circumflex vessels and is free of disease. Circumflex: Circumflex coronary is codominant for the posterior circulation and gives rise to 2 very small obtuse marginal branches, a large third obtuse marginal branch small fourth obtuse marginal branch moderate third obtuse marginal branch and moderate sixth obtuse marginal branch.  Only mild irregularities of less than 30% are seen in the circumflex. LAD: The LAD is occluded proximally after gives rise to a large diagonal branch. RCA: The right coronary artery is codominant for the posterior circulation and gives rise to the PDA.  The right coronary is small and contains no significant disease. PTCA/stent placement in the LAD: Preprocedure 100% occluded with ADÁN 0 flow Postprocedure no residual disease with ADÁN-3 flow Complications none     CT Head Without Contrast  Result Date: 4/6/2025  Impression: No acute intracranial pathology. Electronically Signed: Donovan Michel MD  4/6/2025 5:54 PM EDT  Workstation ID: [id number]    XR Chest 1 View  Result Date: 4/6/2025  Impression:    Appropriate placement of the right internal jugular central venous catheter Electronically Signed: Donovan Michel MD  4/6/2025 4:35 PM EDT  Workstation ID: [id number]    XR Chest 1 View  Result Date: 4/6/2025  Impression: 1.  Medial left lower lobe airspace disease and suspected trace effusion. Electronically Signed: Jaylene Gagnon MD  4/6/2025 3:54 PM EDT  Workstation ID: [id number]       INFUSIONS  dexmedetomidine, 0.2-1.5 mcg/kg/hr  DOBUTamine, 2-20 mcg/kg/min, Last Rate: Stopped (04/07/25 0312)  EPINEPHrine, 0.02-0.3 mcg/kg/min, Last Rate: 0.3 mcg/kg/min (04/07/25 0516)  fentanyl 10 mcg/mL,  mcg/hr, Last Rate: 200 mcg/hr (04/07/25 0206)  heparin, 12 Units/kg/hr, Last Rate: 12 Units/kg/hr (04/07/25 1003)  insulin, 0-100 Units/hr, Last Rate: 6.2 Units/hr (04/07/25 1002)  norepinephrine, 0.02-0.5 mcg/kg/min, Last Rate: 0.5 mcg/kg/min (04/07/25 0958)  Pharmacy to Dose Heparin,   phenylephrine, 0.5-5 mcg/kg/min, Last Rate: 5 mcg/kg/min (04/07/25 0804)  propofol, 5-50 mcg/kg/min, Last Rate: Stopped (04/07/25 0207)  sodium bicarbonate 150 mEq in D5W, 75 mL/hr, Last Rate: 75 mL/hr (04/07/25 0116)  vasopressin, 0.03 Units/min, Last Rate: 0.03 Units/min (04/07/25 0432)        Results from last 7 days   Lab Units 04/07/25  0808 04/07/25  0303 04/06/25  2101   WBC 10*3/mm3 18.32* 18.71* 19.47*   HEMOGLOBIN g/dL 7.0* 8.9* 11.1*   HEMATOCRIT % 21.6* 27.5* 33.3*   PLATELETS 10*3/mm3 82* 135* 177     Results from last 7 days   Lab Units 04/07/25  0808 04/07/25  0303 04/07/25  0023 04/06/25  2101   SODIUM mmol/L 160* 156*  --  152*   POTASSIUM mmol/L 4.4 5.0 3.4* 2.4*   CHLORIDE mmol/L 117* 113*  --  106   CO2 mmol/L 7.0* 10.0*  --  13.0*   BUN mg/dL 25* 25*  --  24*   GLUCOSE mg/dL 223* 394*  --  283*   CREATININE mg/dL 2.65* 1.98*  --  1.82*   MAGNESIUM mg/dL 2.5* 2.7*  --  1.9   PHOSPHORUS mg/dL 4.4 3.9  --  1.0*   CALCIUM mg/dL 7.0* 7.3*  --  7.7*   ALBUMIN g/dL 3.0* 3.1*  --  3.1*         Results from last 7 days   Lab Units 04/07/25  0429 04/07/25  0030 04/06/25  1913 04/06/25  1520 04/06/25  1312   PH, ARTERIAL pH units 7.199* 7.197* 7.192* 7.278* 7.092*   PCO2, ARTERIAL mm Hg 24.9* 27.5* 29.3* 30.8* 40.4   PO2 ART mm Hg 74.4* 72.4*  143.0* 79.7* 234.0*   FIO2 % 40 40 90 100 100       Patient isn't on Tube Feeding   /h  Patient doesn't have any tube feeding modular orders    Mechanical Ventilator:   Settings: Observed:   Mode: VC+/AC (04/07/25 0708)    Vt (Set, mL): 420 mL (04/07/25 0708) Vt Mandatory Ins (observed, mL): 413 mL (04/07/25 0708)   Resp Rate (Set): 24 (04/07/25 0708) Resp Rate (Observed) Vent: 24 (04/07/25 1000)     Minute Ventilation (L/min) (Obs): 9.69 L/min (04/07/25 0708)       FiO2 (%): 40 % (04/07/25 0708) Plateau Pressure (cm H2O): (S) 28 cm H2O (04/07/25 0708)   PEEP/CPAP (cm H2O): 5 cm H20 (04/07/25 0708) I:E Ratio (Obs): 1:1.8 (04/07/25 0708)         I reviewed the patient's medications.    Assessment & Plan   ASSESSMENT/PLAN     Active Hospital Problems    Diagnosis     **STEMI involving left anterior descending coronary artery     Cardiac arrest     Acute respiratory failure with hypoxia     Metabolic acidosis     Cardiogenic shock     Acute kidney injury        70-year-old female with a past medical history significant for hypertension, dyslipidemia, and GERD.  Reportedly she was complaining of some chest symptoms, possibly reflux on the day of admission and her grandson was to bring her to the ER but she collapsed and became unresponsive.  EMS was called and the patient was initially found in PEA arrest.  CPR was initiated and the patient was transported to our ER though time to ROSC was estimated at 45 minutes per the chart.  EKG revealed findings consistent with STEMI.  She underwent LAD intervention which was the culprit lesion.  IABP placed in the Cath Lab.  She was unresponsive and admitted to ICU for targeted temperature management.    Overnight she has had worsening shock and pressors have been escalated to their limits.  She has had ongoing lack of any meaningful response to stimuli.  GCS is 3.  A discussion overnight with the patient's daughter who is her POA resulted in a DNR status with nonescalation of  care due to her very poor prognosis.  She likely has significant/severe anoxic encephalopathy though even more acutely she has refractory shock, metabolic acidosis, and renal failure.    Plan:    Continue current measures while we are awaiting the arrival of family  No escalation of care  DNR status  Consider de-escalation of care including compassionate withdrawal of any futile interventions in consultation with the family     I discussed the patient's findings and my recommendations with nursing staff     Plan of care and goals reviewed with multidisciplinary team at daily rounds.    Any copied data from previous notes included in the (1) History of Present Illness, (2) Physical Examination and (3) Medical Decision Making and/or Assessment and Plan has been reviewed and is accurate as of 04/07/25    Critical Care time spent in direct patient care: 35 minutes (excluding procedure time, if applicable) including high complexity decision making to assess, manipulate, and support vital organ system failure in this individual who has impairment of one or more vital organ systems such that there is a high probability of imminent or life threatening deterioration in the patient's condition.    Harinder Jo MD  Pulmonary and Critical Care Medicine  04/07/25 10:20 EDT

## 2025-04-07 NOTE — PROGRESS NOTES
Pharmacy to Dose Heparin Infusion Note    Falguni Laureano is a 70 y.o. female receiving heparin infusion.     Therapy for (VTE/Cardiac): Cardiac  Patient Weight: 79.4 kg  Initial Bolus (Y/N): Yes  Any Bolus (Y/N): Yes     Signs or Symptoms of Bleeding: No S/Sx overt bleeding noted - d/w RN.    Cardiac or Other (Not VTE)   Initial Bolus: 60 units/kg (Max 4,000 units)  Initial rate: 12 units/kg/hr (Max 1,000 units/hr)   Anti-Xa Bolus   Dose Infusion Hold   Time Infusion Rate Change (units/kg/hr) Repeat  Anti-Xa    < 0.11 50 units/kg  (4000 units Max) None Increase by  3 units/kg/hr 6 hours   0.11- 0.19 25 units/kg  (2000 units Max) None Increase by  2 units/kg/hr 6 hours   0.2 - 0.29 0 None Increase by  1 units/kg/hr 6 hours   0.3 - 0.5 0 None No Change 6 hours (after 2 consecutive levels in range check qAM)   0.51 - 0.6 0 None Decrease by  1 units/kg/hr 6 hours   0.61 - 0.8 0 30 minutes Decrease by  2 units/kg/hr 6 hours   0.81 - 1 0 60 minutes Decrease by  3 units/kg/hr 6 hours   >1 0 Hold  After Anti-Xa less than 0.5 decrease previous rate by  4 units/kg/hr  Every 2 hours until Anti-Xa  less than 0.5 then when infusion restarts in 6 hours     Results from last 7 days   Lab Units 04/07/25  0808 04/07/25  0303 04/06/25  2101 04/06/25  1623 04/06/25  1148 04/06/25  1144   INR   --   --   --  1.48*  --  1.13*   HEMOGLOBIN g/dL 7.0* 8.9* 11.1* 11.6*  --  14.6   HEMOGLOBIN, POC   --   --   --   --    < >  --    HEMATOCRIT % 21.6* 27.5* 33.3* 33.9*  --  46.0   HEMATOCRIT POC   --   --   --   --    < >  --    PLATELETS 10*3/mm3 82* 135* 177 207  --  217    < > = values in this interval not displayed.       Date   Time   Anti-Xa Current Rate (units/kg/hr) Bolus   (units) Rate Change   (units/kg/hr) New Rate (units/kg/hr) Repeat  Anti-Xa Comments /  Pump Check    4/6 1722 0.59 NEW -- +12 12 0000 DW RN; deferring initial bolus, 10,000 units received in cath lab, initial Xa 0.59   4/6 2336 0.69 12 -- -2 10 0800 Discussed w/  nurse  Hold x 30 minutes     4/7   0808   0.17   10   --   +2   12   1700 D/w ANGELA Jasso. [facility] discussion ongoing - possible transition to comfort care. D/w Dr. Johnson - defer bolus at this time.                                                                                                                                                                                                           Rojelio Solis ALVINA  4/7/2025  09:53 EDT

## 2025-04-07 NOTE — PLAN OF CARE
Goal Outcome Evaluation:  Plan of Care Reviewed With: lele(elizabeth)        Progress: declining  Outcome Evaluation: Overnight patient care managed per intensivist, APRN and cardiology. By 0400 patient on .18mcg/kg/min epinephrine, .5mcg/kg/min levophed, 5mcg/kg/min aimee, vasopressin at .03 unit/min, bicarb gtt at 75mL/hr, electrolytes replaced (magnesium, potassium, and phosphorus), patient has balloon pump in place per MD- discussed with intesivist MD and cardiology and balloon pump decreased to 1:2 to improve perfusion, 5 amps bicarb given, see MAR for further medication administration. Patient not noted to follow commands, no noted cough, gag, or corneal reflex on sedation vacation, patient condition discussed at length with intensivist and cardiology. Gurpreet Foster called at 0230 and advised to come in to be with patient, per request. Daughter Elma contacted per APRN and discussed goals of care- they decided to continue full treatment but DNR if patient lost a pulse- orders entered per APRN. Daughter is [place] route from out of state. Urine output- 550mL, TTM in place per order, sinus tachycardia to sinus rhythm per monitor rate 90s-120s, audible heart tones, palpable pulses, no audible bowel sounds, dobutamine gtt attempted per cardiology order, however, worsened patient's hemodynamics and stopped per protocol. Lung sounds coarse with intermittent rhonchi, heparin gtt managed per pharmacy, on fentanyl for sedation, propofol stopped related to hemodynamics per MD.

## 2025-04-07 NOTE — SIGNIFICANT NOTE
Exam confirms with auscultation zero audible heart tones and zero audible respirations. Ms.Sarah JOSE DAVID Laureano was pronounced dead at 1415.  MD notified by Patient's RN.    Ritesh Mayfield RN  Clinical House Supervisor  4/7/2025 14:57 EDT

## 2025-04-14 NOTE — DISCHARGE SUMMARY
Physician Discharge Summary     Patient ID:  Falguni Laureano  [id number]  70 y.o.  1955    Admit date: 4/6/2025    Discharge date and time: 4/7/2025  3:00 PM     Admitting Physician: Ya Finnegan MD     Primary Physician: Fiona Fitch PA-C    Discharge Physician: Ya Finnegan MD    Admission Diagnoses: STEMI involving left anterior descending coronary artery [I21.02]    Discharge Diagnoses:   Patient Active Problem List    Diagnosis     *STEMI involving left anterior descending coronary artery [I21.02]     Cardiac arrest [I46.9]     Acute respiratory failure with hypoxia [J96.01]     Metabolic acidosis [E87.20]     Cardiogenic shock [R57.0]     Acute kidney injury [N17.9]     Abnormal EKG [R94.31]     Hypertensive heart disease without heart failure [I11.9]     Arthritis [M19.90]     Depression [F32.A]     Poor sleep pattern [G47.8]     Dermatitis [L30.9]     Bilateral impacted cerumen [H61.23]     Dyslipidemia [E78.5]     Hypertension [I10]     Gastroesophageal reflux disease [K21.9]        Cardiology Procedures this admission:    1.  Emergent cardiac catheterization with stent placement x 2 to the LAD with intra-aortic balloon pump placement  2.  Mechanical ventilation for respiratory failure   3.  Targeted temperature management      Hospital Course:   Ms. Laureano was brought by EMS to the emergency room on 4/6/2025 after she collapsed at home.  EMS was called and found the patient to be in PEA arrest.  CPR was initiated and the patient received 2 rounds of epinephrine and was intubated en route to the ED.  Time to ROSC was estimated at 45 minutes.  During the procedure as well as following transferred to the ICU the patient was unresponsive.  The patient was acidotic.  Epinephrine and norepinephrine drips had been initiated in the Cath Lab.  Targeted temperature management was implemented with a goal of 36 °F.  On 4/7/2025 the patient was requiring additional pressors and was on  bicarbonate, vasopressin, epinephrine, norepinephrine and Manoj-Synephrine as well as heparin fentanyl and insulin.  She had a refractory acidosis and worsening renal function.  She was made DNR by the family with the decision for no further escalation of care.  The patient was pronounced  on 2025 at 1415 hrs.       Signed:  Ya Finnegan MD  2025  15:05 EDT

## (undated) DEVICE — CATH IMG IVUS EAGLE EYE PLATIN RX DIGITAL .014IN 5FR

## (undated) DEVICE — GUIDE CATHETER: Brand: MACH1™

## (undated) DEVICE — PINNACLE INTRODUCER SHEATH: Brand: PINNACLE

## (undated) DEVICE — TREK CORONARY DILATATION CATHETER 2.50 MM X 12 MM / RAPID-EXCHANGE: Brand: TREK

## (undated) DEVICE — CATH DIAG EXPO M/ PK 6FR FL4/FR4 PIG 3PK

## (undated) DEVICE — NDL PERC 1PRT THNWALL W/BASEPLT 18G 7CM

## (undated) DEVICE — GW PERIPH VASC ADX J/TP SS .035 150CM 3MM

## (undated) DEVICE — HI-TORQUE PILOT 150 GUIDE WIRE .014 STRAIGHT TIP 3.0 CM X 190 CM: Brand: HI-TORQUE PILOT

## (undated) DEVICE — MINI TREK CORONARY DILATATION CATHETER 2.0 MM X 15 MM / RAPID-EXCHANGE: Brand: MINI TREK

## (undated) DEVICE — NC TREK NEO™ CORONARY DILATATION CATHETER 3.50 MM X 12 MM / RAPID-EXCHANGE: Brand: NC TREK NEO™

## (undated) DEVICE — PK CATH CARD 10

## (undated) DEVICE — BALN IAB SENSATION F/O W/CONN 7F 40CC LF

## (undated) DEVICE — LHK- LEFT HEART KIT BAPTIST: Brand: MEDLINE INDUSTRIES, INC.

## (undated) DEVICE — DEV INFL MONARCH 25W